# Patient Record
Sex: FEMALE | Race: WHITE | NOT HISPANIC OR LATINO
[De-identification: names, ages, dates, MRNs, and addresses within clinical notes are randomized per-mention and may not be internally consistent; named-entity substitution may affect disease eponyms.]

---

## 2017-06-01 ENCOUNTER — APPOINTMENT (OUTPATIENT)
Dept: CARDIOLOGY | Facility: CLINIC | Age: 82
End: 2017-06-01

## 2017-06-01 ENCOUNTER — NON-APPOINTMENT (OUTPATIENT)
Age: 82
End: 2017-06-01

## 2017-06-01 VITALS
DIASTOLIC BLOOD PRESSURE: 82 MMHG | SYSTOLIC BLOOD PRESSURE: 129 MMHG | BODY MASS INDEX: 20.49 KG/M2 | OXYGEN SATURATION: 93 % | WEIGHT: 120 LBS | HEIGHT: 64 IN | HEART RATE: 66 BPM

## 2017-06-01 VITALS — OXYGEN SATURATION: 96 %

## 2017-12-28 ENCOUNTER — APPOINTMENT (OUTPATIENT)
Dept: CARDIOLOGY | Facility: CLINIC | Age: 82
End: 2017-12-28
Payer: MEDICARE

## 2017-12-28 ENCOUNTER — NON-APPOINTMENT (OUTPATIENT)
Age: 82
End: 2017-12-28

## 2017-12-28 VITALS
HEART RATE: 70 BPM | DIASTOLIC BLOOD PRESSURE: 78 MMHG | OXYGEN SATURATION: 93 % | BODY MASS INDEX: 21.11 KG/M2 | SYSTOLIC BLOOD PRESSURE: 133 MMHG | WEIGHT: 123 LBS

## 2017-12-28 VITALS — HEART RATE: 68 BPM | OXYGEN SATURATION: 95 %

## 2017-12-28 PROCEDURE — 99214 OFFICE O/P EST MOD 30 MIN: CPT

## 2017-12-28 PROCEDURE — 93000 ELECTROCARDIOGRAM COMPLETE: CPT

## 2018-02-23 ENCOUNTER — EMERGENCY (EMERGENCY)
Facility: HOSPITAL | Age: 83
LOS: 1 days | Discharge: HOME | End: 2018-02-23
Attending: EMERGENCY MEDICINE | Admitting: HOSPITALIST
Payer: MEDICARE

## 2018-02-23 VITALS
RESPIRATION RATE: 18 BRPM | DIASTOLIC BLOOD PRESSURE: 81 MMHG | HEART RATE: 71 BPM | SYSTOLIC BLOOD PRESSURE: 158 MMHG | OXYGEN SATURATION: 93 %

## 2018-02-23 DIAGNOSIS — Z29.9 ENCOUNTER FOR PROPHYLACTIC MEASURES, UNSPECIFIED: ICD-10-CM

## 2018-02-23 DIAGNOSIS — R74.8 ABNORMAL LEVELS OF OTHER SERUM ENZYMES: ICD-10-CM

## 2018-02-23 DIAGNOSIS — K92.2 GASTROINTESTINAL HEMORRHAGE, UNSPECIFIED: ICD-10-CM

## 2018-02-23 DIAGNOSIS — R63.8 OTHER SYMPTOMS AND SIGNS CONCERNING FOOD AND FLUID INTAKE: ICD-10-CM

## 2018-02-23 DIAGNOSIS — R11.10 VOMITING, UNSPECIFIED: ICD-10-CM

## 2018-02-23 DIAGNOSIS — K92.0 HEMATEMESIS: ICD-10-CM

## 2018-02-23 DIAGNOSIS — F03.90 UNSPECIFIED DEMENTIA WITHOUT BEHAVIORAL DISTURBANCE: ICD-10-CM

## 2018-02-23 DIAGNOSIS — Z98.890 OTHER SPECIFIED POSTPROCEDURAL STATES: Chronic | ICD-10-CM

## 2018-02-23 LAB
ALBUMIN SERPL ELPH-MCNC: 3.5 G/DL — SIGNIFICANT CHANGE UP (ref 3.3–5)
ALBUMIN SERPL ELPH-MCNC: 3.8 G/DL — SIGNIFICANT CHANGE UP (ref 3.3–5)
ALP SERPL-CCNC: 102 U/L — SIGNIFICANT CHANGE UP (ref 40–120)
ALP SERPL-CCNC: 99 U/L — SIGNIFICANT CHANGE UP (ref 40–120)
ALT FLD-CCNC: 126 U/L RC — HIGH (ref 10–45)
ALT FLD-CCNC: 146 U/L — HIGH (ref 10–45)
ANION GAP SERPL CALC-SCNC: 13 MMOL/L — SIGNIFICANT CHANGE UP (ref 5–17)
ANION GAP SERPL CALC-SCNC: 9 MMOL/L — SIGNIFICANT CHANGE UP (ref 5–17)
APTT BLD: 32.6 SEC — SIGNIFICANT CHANGE UP (ref 27.5–37.4)
AST SERPL-CCNC: 176 U/L — HIGH (ref 10–40)
AST SERPL-CCNC: 207 U/L — HIGH (ref 10–40)
BASOPHILS # BLD AUTO: 0 K/UL — SIGNIFICANT CHANGE UP (ref 0–0.2)
BASOPHILS NFR BLD AUTO: 0.1 % — SIGNIFICANT CHANGE UP (ref 0–2)
BILIRUB SERPL-MCNC: 0.8 MG/DL — SIGNIFICANT CHANGE UP (ref 0.2–1.2)
BILIRUB SERPL-MCNC: 1.3 MG/DL — HIGH (ref 0.2–1.2)
BLD GP AB SCN SERPL QL: NEGATIVE — SIGNIFICANT CHANGE UP
BUN SERPL-MCNC: 15 MG/DL — SIGNIFICANT CHANGE UP (ref 7–23)
BUN SERPL-MCNC: 15 MG/DL — SIGNIFICANT CHANGE UP (ref 7–23)
CALCIUM SERPL-MCNC: 9.6 MG/DL — SIGNIFICANT CHANGE UP (ref 8.4–10.5)
CALCIUM SERPL-MCNC: 9.6 MG/DL — SIGNIFICANT CHANGE UP (ref 8.4–10.5)
CHLORIDE SERPL-SCNC: 105 MMOL/L — SIGNIFICANT CHANGE UP (ref 96–108)
CHLORIDE SERPL-SCNC: 106 MMOL/L — SIGNIFICANT CHANGE UP (ref 96–108)
CO2 SERPL-SCNC: 25 MMOL/L — SIGNIFICANT CHANGE UP (ref 22–31)
CO2 SERPL-SCNC: 27 MMOL/L — SIGNIFICANT CHANGE UP (ref 22–31)
CREAT SERPL-MCNC: 0.69 MG/DL — SIGNIFICANT CHANGE UP (ref 0.5–1.3)
CREAT SERPL-MCNC: 0.74 MG/DL — SIGNIFICANT CHANGE UP (ref 0.5–1.3)
EOSINOPHIL # BLD AUTO: 0 K/UL — SIGNIFICANT CHANGE UP (ref 0–0.5)
EOSINOPHIL NFR BLD AUTO: 0.3 % — SIGNIFICANT CHANGE UP (ref 0–6)
GLUCOSE SERPL-MCNC: 128 MG/DL — HIGH (ref 70–99)
GLUCOSE SERPL-MCNC: 99 MG/DL — SIGNIFICANT CHANGE UP (ref 70–99)
HCT VFR BLD CALC: 38.5 % — SIGNIFICANT CHANGE UP (ref 34.5–45)
HCT VFR BLD CALC: 40.4 % — SIGNIFICANT CHANGE UP (ref 34.5–45)
HGB BLD-MCNC: 12 G/DL — SIGNIFICANT CHANGE UP (ref 11.5–15.5)
HGB BLD-MCNC: 13.1 G/DL — SIGNIFICANT CHANGE UP (ref 11.5–15.5)
INR BLD: 1.04 RATIO — SIGNIFICANT CHANGE UP (ref 0.88–1.16)
LYMPHOCYTES # BLD AUTO: 0.8 K/UL — LOW (ref 1–3.3)
LYMPHOCYTES # BLD AUTO: 10.1 % — LOW (ref 13–44)
MCHC RBC-ENTMCNC: 29.7 PG — SIGNIFICANT CHANGE UP (ref 27–34)
MCHC RBC-ENTMCNC: 30.8 PG — SIGNIFICANT CHANGE UP (ref 27–34)
MCHC RBC-ENTMCNC: 31.2 GM/DL — LOW (ref 32–36)
MCHC RBC-ENTMCNC: 32.3 GM/DL — SIGNIFICANT CHANGE UP (ref 32–36)
MCV RBC AUTO: 95.3 FL — SIGNIFICANT CHANGE UP (ref 80–100)
MCV RBC AUTO: 95.3 FL — SIGNIFICANT CHANGE UP (ref 80–100)
MONOCYTES # BLD AUTO: 0.5 K/UL — SIGNIFICANT CHANGE UP (ref 0–0.9)
MONOCYTES NFR BLD AUTO: 5.7 % — SIGNIFICANT CHANGE UP (ref 2–14)
NEUTROPHILS # BLD AUTO: 7.1 K/UL — SIGNIFICANT CHANGE UP (ref 1.8–7.4)
NEUTROPHILS NFR BLD AUTO: 83.9 % — HIGH (ref 43–77)
PLATELET # BLD AUTO: 255 K/UL — SIGNIFICANT CHANGE UP (ref 150–400)
PLATELET # BLD AUTO: 257 K/UL — SIGNIFICANT CHANGE UP (ref 150–400)
POTASSIUM SERPL-MCNC: 4 MMOL/L — SIGNIFICANT CHANGE UP (ref 3.5–5.3)
POTASSIUM SERPL-MCNC: 4.1 MMOL/L — SIGNIFICANT CHANGE UP (ref 3.5–5.3)
POTASSIUM SERPL-SCNC: 4 MMOL/L — SIGNIFICANT CHANGE UP (ref 3.5–5.3)
POTASSIUM SERPL-SCNC: 4.1 MMOL/L — SIGNIFICANT CHANGE UP (ref 3.5–5.3)
PROT SERPL-MCNC: 6.9 G/DL — SIGNIFICANT CHANGE UP (ref 6–8.3)
PROT SERPL-MCNC: 7.7 G/DL — SIGNIFICANT CHANGE UP (ref 6–8.3)
PROTHROM AB SERPL-ACNC: 11.4 SEC — SIGNIFICANT CHANGE UP (ref 9.8–12.7)
RBC # BLD: 4.04 M/UL — SIGNIFICANT CHANGE UP (ref 3.8–5.2)
RBC # BLD: 4.24 M/UL — SIGNIFICANT CHANGE UP (ref 3.8–5.2)
RBC # FLD: 13 % — SIGNIFICANT CHANGE UP (ref 10.3–14.5)
RBC # FLD: 14.4 % — SIGNIFICANT CHANGE UP (ref 10.3–14.5)
RH IG SCN BLD-IMP: POSITIVE — SIGNIFICANT CHANGE UP
SODIUM SERPL-SCNC: 142 MMOL/L — SIGNIFICANT CHANGE UP (ref 135–145)
SODIUM SERPL-SCNC: 143 MMOL/L — SIGNIFICANT CHANGE UP (ref 135–145)
WBC # BLD: 7.34 K/UL — SIGNIFICANT CHANGE UP (ref 3.8–10.5)
WBC # BLD: 8.4 K/UL — SIGNIFICANT CHANGE UP (ref 3.8–10.5)
WBC # FLD AUTO: 7.34 K/UL — SIGNIFICANT CHANGE UP (ref 3.8–10.5)
WBC # FLD AUTO: 8.4 K/UL — SIGNIFICANT CHANGE UP (ref 3.8–10.5)

## 2018-02-23 RX ORDER — PANTOPRAZOLE SODIUM 20 MG/1
80 TABLET, DELAYED RELEASE ORAL ONCE
Qty: 0 | Refills: 0 | Status: COMPLETED | OUTPATIENT
Start: 2018-02-23 | End: 2018-02-23

## 2018-02-23 RX ADMIN — PANTOPRAZOLE SODIUM 80 MILLIGRAM(S): 20 TABLET, DELAYED RELEASE ORAL at 06:26

## 2018-02-23 NOTE — ED PROVIDER NOTE - ATTENDING CONTRIBUTION TO CARE
Patient presenting reporting coffee ground emesis prior to arrival.  Emesis witnessed by home nurse.  No prior history of GIB, no blood thinners, now reporting feeling fine, no longer nauseated.    On exam patient well appearing, vital signs within normal limits, RRR S1/S2, lungs clear to ascultation bilaterally, abdomen soft, non tender, non distended, rectal heme pos.    Patient with UGIB, currently stable, no prior episodes, however given age plan for labs/coags, PPI, admit for monitoring.

## 2018-02-23 NOTE — CONSULT NOTE ADULT - ASSESSMENT
IMP:    1) Dark colored emesis: no signs of overt bleeding currently. Normal Hgb. Vitals stable. Ddx: PUD vs erosive gastritis vs angioectasias.   2) Elevated LFTs: Ddx: CBD stone vs viral hep vs fatty liver vs DILI. No signs of cholangitis.    PLAN:  - monitor H/H  - daily PPI (take 30 minutes prior to breakfast)  - advance diet as tolerated  - no plans for endoscopic evaluation at this time given no signs of overt bleeding and normal Hgb. would pursue conservative management with PPI therapy given risk of anesthesia with the elderly.   If signs of overt bleeding with drop in Hgb, will reconsider endoscopic evaluation. discussed plan with pt's son Ever ([phone]) - he is in agreement.  - consider RUQ sonogram for evaluation of elevated LFTs.

## 2018-02-23 NOTE — H&P ADULT - NSHPLABSRESULTS_GEN_ALL_CORE
.  LABS:                         13.1   8.4   )-----------( 255      ( 23 Feb 2018 05:34 )             40.4     02-23    143  |  105  |  15  ----------------------------<  128<H>  4.1   |  25  |  0.74    Ca    9.6      23 Feb 2018 05:34    TPro  7.7  /  Alb  3.8  /  TBili  1.3<H>  /  DBili  x   /  AST  207<H>  /  ALT  126<H>  /  AlkPhos  102  02-23    PT/INR - ( 23 Feb 2018 05:34 )   PT: 11.4 sec;   INR: 1.04 ratio         PTT - ( 23 Feb 2018 05:34 )  PTT:32.6 sec              EKG Reviewed by me:    Xray reviewed by me:    Consultant notes reviewed:

## 2018-02-23 NOTE — CONSULT NOTE ADULT - SUBJECTIVE AND OBJECTIVE BOX
Chief Complaint:  Patient is a 89y old  Female who presents with a chief complaint of coffee ground emesis (23 Feb 2018 09:13)      HPI: 89F w/hx dementia presents with 4 episodes of dark colored emesis, HHA reports appeared coffee grounds. No fevers/chills/chest pain/sob/abdominal pain/melena/hematochezia. No similar episodes previously. No ASA or NSAIDs. On admission, pt afebrile comfortable appearing with nontender abdomen, normal hgb, mildly elevated Tbili and transaminases.     Allergies:  No Known Allergies      Home Medications:    Hospital Medications:      PMHX/PSHX:  Dementia  No significant past medical history  History of hip surgery      Family history:  No pertinent family history in first degree relatives      Social History:     ROS:     General:  as per HPI       PHYSICAL EXAM:   Vital Signs:  Vital Signs Last 24 Hrs  T(C): 36.5 (23 Feb 2018 05:32), Max: 36.5 (23 Feb 2018 05:32)  T(F): 97.7 (23 Feb 2018 05:32), Max: 97.7 (23 Feb 2018 05:32)  HR: 67 (23 Feb 2018 05:32) (67 - 71)  BP: 132/78 (23 Feb 2018 05:32) (132/78 - 158/81)  BP(mean): --  RR: 18 (23 Feb 2018 05:32) (18 - 18)  SpO2: 98% (23 Feb 2018 05:32) (93% - 98%)  Daily     Daily     GENERAL:  Appears stated age, well-groomed, well-nourished, no distress  HEENT:  NC/AT,  conjunctivae clear and pink, no thyromegaly, nodules, adenopathy, no JVD, sclera -anicteric  CHEST:  Full & symmetric excursion, no increased effort, breath sounds clear  HEART:  Regular rhythm, S1, S2, no murmur/rub/S3/S4, no abdominal bruit, no edema  ABDOMEN:  Soft, non-tender, non-distended, normoactive bowel sounds,  no masses ,no hepato-splenomegaly, no signs of chronic liver disease  EXTEREMITIES:  no cyanosis,clubbing or edema  SKIN:  No rash/erythema/ecchymoses/petechiae/wounds/abscess/warm/dry  NEURO:  Alert, oriented, no asterixis, no tremor, no encephalopathy    LABS:                        13.1   8.4   )-----------( 255      ( 23 Feb 2018 05:34 )             40.4     02-23    143  |  105  |  15  ----------------------------<  128<H>  4.1   |  25  |  0.74    Ca    9.6      23 Feb 2018 05:34    TPro  7.7  /  Alb  3.8  /  TBili  1.3<H>  /  DBili  x   /  AST  207<H>  /  ALT  126<H>  /  AlkPhos  102  02-23    LIVER FUNCTIONS - ( 23 Feb 2018 05:34 )  Alb: 3.8 g/dL / Pro: 7.7 g/dL / ALK PHOS: 102 U/L / ALT: 126 U/L RC / AST: 207 U/L / GGT: x           PT/INR - ( 23 Feb 2018 05:34 )   PT: 11.4 sec;   INR: 1.04 ratio         PTT - ( 23 Feb 2018 05:34 )  PTT:32.6 sec        Imaging:

## 2018-02-23 NOTE — H&P ADULT - NSHPPHYSICALEXAM_GEN_ALL_CORE
VITALS  Vital Signs Last 24 Hrs  T(C): 36.5 (23 Feb 2018 05:32), Max: 36.5 (23 Feb 2018 05:32)  T(F): 97.7 (23 Feb 2018 05:32), Max: 97.7 (23 Feb 2018 05:32)  HR: 67 (23 Feb 2018 05:32) (67 - 71)  BP: 132/78 (23 Feb 2018 05:32) (132/78 - 158/81)  BP(mean): --  RR: 18 (23 Feb 2018 05:32) (18 - 18)  SpO2: 98% (23 Feb 2018 05:32) (93% - 98%)    I&O's Summary      PHYSICAL EXAM  General: A&Ox1 (knew birthday), NAD, frail  Head: NC/AT;  Eyes: PERRL; EOMI; anicteric sclera  Neck: Supple; no JVD  Respiratory: CTA B/L; no wheezes/crackles/rales   Cardiovascular: Regular rhythm/rate; S1/S2, DAVIS at RUSB  Gastrointestinal: Soft; NTND w/out rebound tenderness or guarding; bowel sounds normal  Extremities: WWP; no edema or cyanosis  Neurological:  CNII-XII grossly intact; no obvious focal deficits

## 2018-02-23 NOTE — ED PROVIDER NOTE - MEDICAL DECISION MAKING DETAILS
89 Y F with coffee ground emesis this evening at 2am now with no symptoms. DDx: UGIB, gastritis, will get CBC, CMP, type and cross, coags. Likely dispo admit pending workup.

## 2018-02-23 NOTE — H&P ADULT - PROBLEM SELECTOR PLAN 1
- Pt with cofffee ground emesis prior to arrival, no further episodes.  Hgb stable, will repeat in 8 hours from initial to make sure not trending down  - GI consult placed  - NPO pending GI recs  - PPI BID

## 2018-02-23 NOTE — ED ADULT NURSE REASSESSMENT NOTE - NS ED NURSE REASSESS COMMENT FT1
Report received from change of shift VERONICA CASTELLANO Patient resting comfortably. RTM Clicked, awaiting bed assignment. Comfort and safety provided.

## 2018-02-23 NOTE — H&P ADULT - PROBLEM SELECTOR PLAN 3
- SCDs - Pt with elevated AST, ALT, unknown baseline.  Currently without any abdominal pain, may have been triggered from vomiting.  Will repeat CMP, if continues to trend up, will consider further imaging at that time.

## 2018-02-23 NOTE — H&P ADULT - HISTORY OF PRESENT ILLNESS
89 yo female with hx of dementia presents with 4 episodes of coffee ground emesis as per HHA last night at 2am.  She states that she has never had this before, and given the acuity she called EMS.  Currently pt denies any n/v/d, or abdominal pain.  She reports feeling well, denies any dizziness.  Pt also denies any CP, or SOB.  The HHA states that the patient did not eat anything different than her usual meals.  ROS otherwise negative.    In the ED the pt received 80mg IV Protonix.

## 2018-02-23 NOTE — ED PROVIDER NOTE - OBJECTIVE STATEMENT
pt 89 y F with no PMHx who presents after 4 episodes of coffee ground emesis at 2 am this morning. pt states that she has never had hx of this before, she denies any symptoms now. She states that she feels great now. She denies chest pain, SOB, weakness, LOC, abdominal pain, diarrhea, constipation. She has never been worked up for upper GI problems and she has not been having any abdominal pain.

## 2018-02-24 ENCOUNTER — TRANSCRIPTION ENCOUNTER (OUTPATIENT)
Age: 83
End: 2018-02-24

## 2018-02-24 VITALS
DIASTOLIC BLOOD PRESSURE: 71 MMHG | OXYGEN SATURATION: 95 % | SYSTOLIC BLOOD PRESSURE: 119 MMHG | TEMPERATURE: 98 F | RESPIRATION RATE: 16 BRPM | HEART RATE: 66 BPM

## 2018-02-24 LAB
ALBUMIN SERPL ELPH-MCNC: 3.6 G/DL — SIGNIFICANT CHANGE UP (ref 3.3–5)
ALP SERPL-CCNC: 96 U/L — SIGNIFICANT CHANGE UP (ref 40–120)
ALT FLD-CCNC: 90 U/L RC — HIGH (ref 10–45)
ANION GAP SERPL CALC-SCNC: 11 MMOL/L — SIGNIFICANT CHANGE UP (ref 5–17)
AST SERPL-CCNC: 62 U/L — HIGH (ref 10–40)
BASOPHILS # BLD AUTO: 0 K/UL — SIGNIFICANT CHANGE UP (ref 0–0.2)
BASOPHILS NFR BLD AUTO: 0.3 % — SIGNIFICANT CHANGE UP (ref 0–2)
BILIRUB SERPL-MCNC: 0.8 MG/DL — SIGNIFICANT CHANGE UP (ref 0.2–1.2)
BUN SERPL-MCNC: 8 MG/DL — SIGNIFICANT CHANGE UP (ref 7–23)
CALCIUM SERPL-MCNC: 9.4 MG/DL — SIGNIFICANT CHANGE UP (ref 8.4–10.5)
CHLORIDE SERPL-SCNC: 105 MMOL/L — SIGNIFICANT CHANGE UP (ref 96–108)
CO2 SERPL-SCNC: 28 MMOL/L — SIGNIFICANT CHANGE UP (ref 22–31)
CREAT SERPL-MCNC: 0.73 MG/DL — SIGNIFICANT CHANGE UP (ref 0.5–1.3)
EOSINOPHIL # BLD AUTO: 0.1 K/UL — SIGNIFICANT CHANGE UP (ref 0–0.5)
EOSINOPHIL NFR BLD AUTO: 1.9 % — SIGNIFICANT CHANGE UP (ref 0–6)
GLUCOSE SERPL-MCNC: 85 MG/DL — SIGNIFICANT CHANGE UP (ref 70–99)
HCT VFR BLD CALC: 39.6 % — SIGNIFICANT CHANGE UP (ref 34.5–45)
HGB BLD-MCNC: 12.7 G/DL — SIGNIFICANT CHANGE UP (ref 11.5–15.5)
LYMPHOCYTES # BLD AUTO: 1.3 K/UL — SIGNIFICANT CHANGE UP (ref 1–3.3)
LYMPHOCYTES # BLD AUTO: 23.9 % — SIGNIFICANT CHANGE UP (ref 13–44)
MCHC RBC-ENTMCNC: 31 PG — SIGNIFICANT CHANGE UP (ref 27–34)
MCHC RBC-ENTMCNC: 32.2 GM/DL — SIGNIFICANT CHANGE UP (ref 32–36)
MCV RBC AUTO: 96.1 FL — SIGNIFICANT CHANGE UP (ref 80–100)
MONOCYTES # BLD AUTO: 0.5 K/UL — SIGNIFICANT CHANGE UP (ref 0–0.9)
MONOCYTES NFR BLD AUTO: 8.4 % — SIGNIFICANT CHANGE UP (ref 2–14)
NEUTROPHILS # BLD AUTO: 3.6 K/UL — SIGNIFICANT CHANGE UP (ref 1.8–7.4)
NEUTROPHILS NFR BLD AUTO: 65.4 % — SIGNIFICANT CHANGE UP (ref 43–77)
PLATELET # BLD AUTO: 259 K/UL — SIGNIFICANT CHANGE UP (ref 150–400)
POTASSIUM SERPL-MCNC: 3.9 MMOL/L — SIGNIFICANT CHANGE UP (ref 3.5–5.3)
POTASSIUM SERPL-SCNC: 3.9 MMOL/L — SIGNIFICANT CHANGE UP (ref 3.5–5.3)
PROT SERPL-MCNC: 7.1 G/DL — SIGNIFICANT CHANGE UP (ref 6–8.3)
RBC # BLD: 4.12 M/UL — SIGNIFICANT CHANGE UP (ref 3.8–5.2)
RBC # FLD: 13 % — SIGNIFICANT CHANGE UP (ref 10.3–14.5)
SODIUM SERPL-SCNC: 144 MMOL/L — SIGNIFICANT CHANGE UP (ref 135–145)
WBC # BLD: 5.6 K/UL — SIGNIFICANT CHANGE UP (ref 3.8–10.5)
WBC # FLD AUTO: 5.6 K/UL — SIGNIFICANT CHANGE UP (ref 3.8–10.5)

## 2018-02-24 PROCEDURE — 93005 ELECTROCARDIOGRAM TRACING: CPT

## 2018-02-24 PROCEDURE — 90686 IIV4 VACC NO PRSV 0.5 ML IM: CPT

## 2018-02-24 PROCEDURE — 86850 RBC ANTIBODY SCREEN: CPT

## 2018-02-24 PROCEDURE — 86901 BLOOD TYPING SEROLOGIC RH(D): CPT

## 2018-02-24 PROCEDURE — 99285 EMERGENCY DEPT VISIT HI MDM: CPT | Mod: 25

## 2018-02-24 PROCEDURE — 85730 THROMBOPLASTIN TIME PARTIAL: CPT

## 2018-02-24 PROCEDURE — 85027 COMPLETE CBC AUTOMATED: CPT

## 2018-02-24 PROCEDURE — 85610 PROTHROMBIN TIME: CPT

## 2018-02-24 PROCEDURE — 80053 COMPREHEN METABOLIC PANEL: CPT

## 2018-02-24 PROCEDURE — 86900 BLOOD TYPING SEROLOGIC ABO: CPT

## 2018-02-24 PROCEDURE — 76700 US EXAM ABDOM COMPLETE: CPT

## 2018-02-24 RX ORDER — INFLUENZA VIRUS VACCINE 15; 15; 15; 15 UG/.5ML; UG/.5ML; UG/.5ML; UG/.5ML
0.5 SUSPENSION INTRAMUSCULAR ONCE
Qty: 0 | Refills: 0 | Status: COMPLETED | OUTPATIENT
Start: 2018-02-24 | End: 2018-02-24

## 2018-02-24 RX ORDER — PANTOPRAZOLE SODIUM 20 MG/1
1 TABLET, DELAYED RELEASE ORAL
Qty: 0 | Refills: 0 | DISCHARGE
Start: 2018-02-24 | End: 2018-03-25

## 2018-02-24 RX ORDER — INFLUENZA VIRUS VACCINE 15; 15; 15; 15 UG/.5ML; UG/.5ML; UG/.5ML; UG/.5ML
0.5 SUSPENSION INTRAMUSCULAR ONCE
Qty: 0 | Refills: 0 | Status: DISCONTINUED | OUTPATIENT
Start: 2018-02-24 | End: 2018-02-24

## 2018-02-24 RX ORDER — PANTOPRAZOLE SODIUM 20 MG/1
40 TABLET, DELAYED RELEASE ORAL
Qty: 0 | Refills: 0 | Status: DISCONTINUED | OUTPATIENT
Start: 2018-02-24 | End: 2018-02-24

## 2018-02-24 RX ORDER — PANTOPRAZOLE SODIUM 20 MG/1
1 TABLET, DELAYED RELEASE ORAL
Qty: 60 | Refills: 0
Start: 2018-02-24 | End: 2018-03-25

## 2018-02-24 RX ADMIN — PANTOPRAZOLE SODIUM 40 MILLIGRAM(S): 20 TABLET, DELAYED RELEASE ORAL at 06:36

## 2018-02-24 RX ADMIN — INFLUENZA VIRUS VACCINE 0.5 MILLILITER(S): 15; 15; 15; 15 SUSPENSION INTRAMUSCULAR at 11:25

## 2018-02-24 NOTE — DISCHARGE NOTE ADULT - PATIENT PORTAL LINK FT
You can access the [facility] Patient Portal, offered by [facility], by registering with the following website: [email or web address]

## 2018-02-24 NOTE — DISCHARGE NOTE ADULT - CARE PROVIDER_API CALL
Sae Gutierrez), Cardiology; Internal Medicine  [address] [address]  Phone: [phone]  Fax: [phone]

## 2018-02-24 NOTE — DISCHARGE NOTE ADULT - CARE PLAN
Principal Discharge DX:	Hematemesis without nausea  Goal:	Resolution of bleeding  Assessment and plan of treatment:	You came to the hospital because of bloody vomiting. During this hospital stay you were evaluated by gastroenterology who felt that your bleeding had stabilized as your blood pressure and hemoglobin were normal. Please continue taking pantoprazole as instructed once you are discharged and follow up with your primary doctor. If you notice episodes of bloody vomiting or see blood in your stool (or very dark tarry stools) call your doctor immediately or return to the emergency department.  Secondary Diagnosis:	Elevated liver enzymes  Assessment and plan of treatment:	During this admission your liver enzymes were elevated. The cause of this abnormality was not clear, but the enzymes began improving without specific intervention. Please follow up with your primary doctor for repeat testing once you are discharged.  Secondary Diagnosis:	Aortic aneurysm  Assessment and plan of treatment:	You were noted to have a 2.5 cm aortic aneurysm during this admission. Please discuss continued routine surveillance of this with your primary care doctor.

## 2018-02-24 NOTE — DISCHARGE NOTE ADULT - PLAN OF CARE
Resolution of bleeding You came to the hospital because of bloody vomiting. During this hospital stay you were evaluated by gastroenterology who felt that your bleeding had stabilized as your blood pressure and hemoglobin were normal. Please continue taking pantoprazole as instructed once you are discharged and follow up with your primary doctor. If you notice episodes of bloody vomiting or see blood in your stool (or very dark tarry stools) call your doctor immediately or return to the emergency department. During this admission your liver enzymes were elevated. The cause of this abnormality was not clear, but the enzymes began improving without specific intervention. Please follow up with your primary doctor for repeat testing once you are discharged. You were noted to have a 2.5 cm aortic aneurysm during this admission. Please discuss continued routine surveillance of this with your primary care doctor.

## 2018-02-24 NOTE — DISCHARGE NOTE ADULT - HOSPITAL COURSE
89 yo f pmh of dementia presents with 4 episodes of coffee ground emesis as per HHA last night at 2am.  On arrival to the ED her hematemesis had resolved; vital signs were stable and she was not hypotensive or tachycardic. She received 80mg IV Protonix and was evaluated by GI who deferred endoscopy as the patient did not appear to be actively bleeding and was hemodynamically stable. Her LFTs were mildly elevated on admission and she underwent a RUQ ultrasound which did not demonstrate biliary dilatation, gallstones or any other cause of her transaminitis. She was observed overnight and remained asymptomatic and hemodynamically stable; Hgb remained stable throughout her admission. She was discharged home with her home health aid with protonix for outpatient follow up. 89 yo f pmh of dementia presents with 4 episodes of coffee ground emesis as per HHA last night at 2am.  On arrival to the ED her hematemesis had resolved; vital signs were stable and she was not hypotensive or tachycardic. She received 80mg IV Protonix and was evaluated by GI who deferred endoscopy as the patient did not appear to be actively bleeding and was hemodynamically stable. Her LFTs were mildly elevated on admission and she underwent a RUQ ultrasound which did not demonstrate biliary dilatation, gallstones or any other cause of her transaminitis. This imaging did incidentally note a 2.5 cm aortic aneurysm She was observed overnight and remained asymptomatic and hemodynamically stable; Hgb remained stable throughout her admission. She was discharged home with her home health aid with protonix for outpatient follow up. Her son was notified of the 2.5 cm aortic aneurysm and told that she should follow up with her primary doctor. 89 yo f pmh of dementia presents with 4 episodes of coffee ground emesis as per HHA last night at 2am.  On arrival to the ED her hematemesis had resolved; vital signs were stable and she was not hypotensive or tachycardic. She received 80mg IV Protonix and was evaluated by GI who deferred endoscopy as the patient did not appear to be actively bleeding and was hemodynamically stable. Her LFTs were mildly elevated on admission and she underwent a RUQ ultrasound which did not demonstrate biliary dilatation, gallstones or any other cause of her transaminitis. This imaging did incidentally note a 2.5 cm aortic aneurysm She was observed overnight and remained asymptomatic and hemodynamically stable; Hgb remained stable throughout her admission. She was discharged home with her home health aid with protonix for outpatient follow up. Her son was notified of the 2.5 cm aortic aneurysm and told that she should follow up with her primary doctor.     Discharge time spent: 34 min

## 2018-02-24 NOTE — PROGRESS NOTE ADULT - ASSESSMENT
89 yo female with hx of dementia presents with coffee ground emesis, currently resolved with stable BP and Hgb.

## 2018-02-24 NOTE — PROGRESS NOTE ADULT - PROBLEM SELECTOR PLAN 3
Mild transaminitis on admission now downtrending  - RUQ w/o explanation for transaminitis  - Continue to monitor to resolution

## 2018-02-24 NOTE — PROGRESS NOTE ADULT - PROBLEM SELECTOR PLAN 1
Cofffee ground emesis x4 prior to arrival, no further episodes.  - Hgb stable on repeat, BP stable, no tachy  - Appreciate GI recs, will advance diet today as tolerated  - Continue protonix 40 BID Coffee ground emesis x4 prior to arrival, no further episodes.  - Hgb stable on repeat, BP stable, no tachy  - Appreciate GI recs, will advance diet today as tolerated  - Continue protonix 40 BID

## 2018-02-24 NOTE — DISCHARGE NOTE ADULT - INSTRUCTIONS
Please eat a healthy diet that is low in sodium. Please avoid excess alcohol, coffee, cheese, chocolate and acidic foods as these may exacerbate your GI bleed.

## 2018-02-24 NOTE — DISCHARGE NOTE ADULT - ADDITIONAL INSTRUCTIONS
Please follow up with your primary care doctor within one week of discharge for repeat blood work (CBC, CMP).  Please notify your doctor if you experience any bleeding or bloody vomitus.  During this admission imaging demonstrated a 2.5 cm abdominal aortic aneurysm, no intervention was preformed for this while you were hospitalized, please discuss this finding with your primary care doctor.

## 2018-02-24 NOTE — PROGRESS NOTE ADULT - SUBJECTIVE AND OBJECTIVE BOX
CONTACT INFO  Jamal Clay M.D., PGY-1  Pager: [facility]- [phone], [facility]- [phone]    Mon-Fri: pager covered by day team 7am-7pm;   ***Academic conferences M-F 8am-9am & 12pm-1pm- page ONLY if URGENT or if Consultant  /Alia: see chart, primary physician assigned available 7am-12pm  Sat/[place] Coverage 12pm-7pm: NS- page [phone] for Team1-4, [facility]- pager forwarded to covering Resident  For Night coverage 7pm-7am: NS- page [phone] Team1-3, page [phone] Team4 & Care Model    CAROLYN CARRION  89y  Female      Patient is a 89y old  Female who presents with a chief complaint of coffee ground emesis (23 Feb 2018 09:13)      INTERVAL HPI/OVERNIGHT EVENTS: Admitted yesterday following hematemesis at home, hgb/BP stable. Abd US for transaminitis yesterday non-revealing. GI defers intervention. NAEON, patient w/o hematemesis and completely asymptomatic this AM.     REVIEW OF SYSTEMS:  CONSTITUTIONAL: No fever  RESPIRATORY: No cough, wheezing, chills or hemoptysis; No shortness of breath  CARDIOVASCULAR: No chest pain  GASTROINTESTINAL: No abdominal or epigastric pain. No diarrhea or constipation. No nausea, vomiting, or hematemesis. No melena or hematochezia.  GENITOURINARY: No dysuria  NEUROLOGICAL: No headaches  MUSCULOSKELETAL: No joint pain  SKIN: No itching  PSYCHIATRIC: No difficulty sleeping  HEME/LYMPH: No easy bleeding    Vital Signs Last 24 Hrs  T(C): 36.6 (24 Feb 2018 05:01), Max: 37.1 (23 Feb 2018 17:15)  T(F): 97.9 (24 Feb 2018 05:01), Max: 98.8 (23 Feb 2018 17:15)  HR: 61 (24 Feb 2018 05:01) (57 - 64)  BP: 142/85 (24 Feb 2018 05:01) (129/78 - 173/77)  BP(mean): --  RR: 18 (24 Feb 2018 05:01) (16 - 18)  SpO2: 97% (24 Feb 2018 05:01) (93% - 97%)    PHYSICAL EXAM:  GENERAL: NAD  HEAD:  Atraumatic  EYES: EOMI  ENMT: Moist mucous membranes  NECK: Supple  NERVOUS SYSTEM:  Alert & Oriented X3, Good concentration; MAEW, SILT distal extremity  CHEST/LUNG: Clear to auscultation bilaterally; No rales, rhonchi, wheezing, or rubs  HEART: Regular rate and rhythm; III/VI systolic ejection murmur  ABDOMEN: Soft, Nontender, Nondistended; Bowel sounds present  EXTREMITIES: s/p L pinky amputation. Warm, no edema  SKIN: No rashes or lesions    Consultant(s) Notes Reviewed: GI    LABS:                        12.7   5.6   )-----------( 259      ( 24 Feb 2018 06:55 )             39.6     02-24    144  |  105  |  8   ----------------------------<  85  3.9   |  28  |  0.73    Ca    9.4      24 Feb 2018 06:55    TPro  7.1  /  Alb  3.6  /  TBili  0.8  /  DBili  x   /  AST  62<H>  /  ALT  90<H>  /  AlkPhos  96  02-24    PT/INR - ( 23 Feb 2018 05:34 )   PT: 11.4 sec;   INR: 1.04 ratio         PTT - ( 23 Feb 2018 05:34 )  PTT:32.6 sec

## 2018-03-08 ENCOUNTER — APPOINTMENT (OUTPATIENT)
Dept: CARDIOLOGY | Facility: CLINIC | Age: 83
End: 2018-03-08
Payer: MEDICARE

## 2018-03-08 ENCOUNTER — NON-APPOINTMENT (OUTPATIENT)
Age: 83
End: 2018-03-08

## 2018-03-08 VITALS
WEIGHT: 128 LBS | DIASTOLIC BLOOD PRESSURE: 79 MMHG | HEART RATE: 84 BPM | OXYGEN SATURATION: 94 % | BODY MASS INDEX: 21.85 KG/M2 | HEIGHT: 64 IN | SYSTOLIC BLOOD PRESSURE: 133 MMHG

## 2018-03-08 PROCEDURE — 99214 OFFICE O/P EST MOD 30 MIN: CPT

## 2018-03-08 PROCEDURE — 93000 ELECTROCARDIOGRAM COMPLETE: CPT

## 2018-03-12 LAB
ALBUMIN SERPL ELPH-MCNC: 4.1 G/DL
ALP BLD-CCNC: 258 U/L
ALT SERPL-CCNC: 251 U/L
ANION GAP SERPL CALC-SCNC: 19 MMOL/L
AST SERPL-CCNC: 129 U/L
BASOPHILS # BLD AUTO: 0.01 K/UL
BASOPHILS NFR BLD AUTO: 0.1 %
BILIRUB SERPL-MCNC: 2.1 MG/DL
BUN SERPL-MCNC: 17 MG/DL
CALCIUM SERPL-MCNC: 9.7 MG/DL
CHLORIDE SERPL-SCNC: 101 MMOL/L
CHOLEST SERPL-MCNC: 216 MG/DL
CHOLEST/HDLC SERPL: 4.7 RATIO
CO2 SERPL-SCNC: 23 MMOL/L
CREAT SERPL-MCNC: 0.89 MG/DL
EOSINOPHIL # BLD AUTO: 0 K/UL
EOSINOPHIL NFR BLD AUTO: 0 %
ESTIMATED AVERAGE GLUCOSE: 117 MG/DL
GLUCOSE SERPL-MCNC: 138 MG/DL
HBA1C MFR BLD HPLC: 5.7 %
HCT VFR BLD CALC: 42.9 %
HDLC SERPL-MCNC: 46 MG/DL
HGB BLD-MCNC: 13 G/DL
IMM GRANULOCYTES NFR BLD AUTO: 0.2 %
LDLC SERPL CALC-MCNC: 149 MG/DL
LYMPHOCYTES # BLD AUTO: 0.95 K/UL
LYMPHOCYTES NFR BLD AUTO: 7.4 %
MAN DIFF?: NORMAL
MCHC RBC-ENTMCNC: 29.8 PG
MCHC RBC-ENTMCNC: 30.3 GM/DL
MCV RBC AUTO: 98.4 FL
MONOCYTES # BLD AUTO: 0.58 K/UL
MONOCYTES NFR BLD AUTO: 4.5 %
NEUTROPHILS # BLD AUTO: 11.23 K/UL
NEUTROPHILS NFR BLD AUTO: 87.8 %
PLATELET # BLD AUTO: 253 K/UL
POTASSIUM SERPL-SCNC: 3.8 MMOL/L
PROT SERPL-MCNC: 7.9 G/DL
RBC # BLD: 4.36 M/UL
RBC # FLD: 15.4 %
SODIUM SERPL-SCNC: 143 MMOL/L
T4 SERPL-MCNC: 9 UG/DL
TRIGL SERPL-MCNC: 105 MG/DL
TSH SERPL-ACNC: 1.9 UIU/ML
WBC # FLD AUTO: 12.79 K/UL

## 2018-04-13 ENCOUNTER — MOBILE ON CALL (OUTPATIENT)
Age: 83
End: 2018-04-13

## 2018-04-25 ENCOUNTER — APPOINTMENT (OUTPATIENT)
Dept: ULTRASOUND IMAGING | Facility: CLINIC | Age: 83
End: 2018-04-25

## 2018-05-11 ENCOUNTER — APPOINTMENT (OUTPATIENT)
Dept: INTERNAL MEDICINE | Facility: CLINIC | Age: 83
End: 2018-05-11

## 2018-05-29 ENCOUNTER — OTHER (OUTPATIENT)
Age: 83
End: 2018-05-29

## 2018-06-16 NOTE — DISCHARGE NOTE ADULT - THE PATIENT HAS
DISCHARGE PLANNING     Discharge to home or other facility with appropriate resources Progressing        DISCHARGE PLANNING - CARE MANAGEMENT     Discharge to post-acute care or home with appropriate resources Progressing        INFECTION - ADULT     Absence or prevention of progression during hospitalization Progressing     Absence of fever/infection during neutropenic period Progressing        Knowledge Deficit     Patient/family/caregiver demonstrates understanding of disease process, treatment plan, medications, and discharge instructions Progressing        Nutrition/Hydration-ADULT     Nutrient/Hydration intake appropriate for improving, restoring or maintaining nutritional needs Progressing        PAIN - ADULT     Verbalizes/displays adequate comfort level or baseline comfort level Progressing        RESPIRATORY - ADULT     Achieves optimal ventilation and oxygenation Progressing        SAFETY ADULT     Patient will remain free of falls Progressing     Maintain or return to baseline ADL function Progressing     Maintain or return mobility status to optimal level Progressing no difficulties

## 2018-07-10 ENCOUNTER — APPOINTMENT (OUTPATIENT)
Dept: CARDIOLOGY | Facility: CLINIC | Age: 83
End: 2018-07-10
Payer: MEDICARE

## 2018-07-10 ENCOUNTER — NON-APPOINTMENT (OUTPATIENT)
Age: 83
End: 2018-07-10

## 2018-07-10 VITALS
SYSTOLIC BLOOD PRESSURE: 123 MMHG | HEART RATE: 66 BPM | HEIGHT: 64 IN | WEIGHT: 128 LBS | BODY MASS INDEX: 21.85 KG/M2 | DIASTOLIC BLOOD PRESSURE: 79 MMHG

## 2018-07-10 DIAGNOSIS — K92.0 HEMATEMESIS: ICD-10-CM

## 2018-07-10 DIAGNOSIS — R11.2 NAUSEA WITH VOMITING, UNSPECIFIED: ICD-10-CM

## 2018-07-10 PROCEDURE — 93000 ELECTROCARDIOGRAM COMPLETE: CPT

## 2018-07-10 PROCEDURE — 99214 OFFICE O/P EST MOD 30 MIN: CPT

## 2018-07-11 LAB
ALBUMIN SERPL ELPH-MCNC: 4.1 G/DL
ALP BLD-CCNC: 93 U/L
ALT SERPL-CCNC: 16 U/L
ANION GAP SERPL CALC-SCNC: 15 MMOL/L
AST SERPL-CCNC: 21 U/L
BASOPHILS # BLD AUTO: 0.01 K/UL
BASOPHILS NFR BLD AUTO: 0.2 %
BILIRUB SERPL-MCNC: 0.6 MG/DL
BUN SERPL-MCNC: 11 MG/DL
CALCIUM SERPL-MCNC: 9.5 MG/DL
CHLORIDE SERPL-SCNC: 103 MMOL/L
CHOLEST SERPL-MCNC: 246 MG/DL
CHOLEST/HDLC SERPL: 6.3 RATIO
CO2 SERPL-SCNC: 25 MMOL/L
CREAT SERPL-MCNC: 0.78 MG/DL
EOSINOPHIL # BLD AUTO: 0.04 K/UL
EOSINOPHIL NFR BLD AUTO: 0.7 %
ESTIMATED AVERAGE GLUCOSE: 111 MG/DL
GLUCOSE SERPL-MCNC: 95 MG/DL
HBA1C MFR BLD HPLC: 5.5 %
HCT VFR BLD CALC: 42.6 %
HDLC SERPL-MCNC: 39 MG/DL
HGB BLD-MCNC: 13.1 G/DL
IMM GRANULOCYTES NFR BLD AUTO: 0.2 %
LDLC SERPL CALC-MCNC: 178 MG/DL
LYMPHOCYTES # BLD AUTO: 1.24 K/UL
LYMPHOCYTES NFR BLD AUTO: 22 %
MAN DIFF?: NORMAL
MCHC RBC-ENTMCNC: 30.8 GM/DL
MCHC RBC-ENTMCNC: 30.8 PG
MCV RBC AUTO: 100.2 FL
MONOCYTES # BLD AUTO: 0.42 K/UL
MONOCYTES NFR BLD AUTO: 7.4 %
NEUTROPHILS # BLD AUTO: 3.92 K/UL
NEUTROPHILS NFR BLD AUTO: 69.5 %
PLATELET # BLD AUTO: 280 K/UL
POTASSIUM SERPL-SCNC: 4.1 MMOL/L
PROT SERPL-MCNC: 7.3 G/DL
RBC # BLD: 4.25 M/UL
RBC # FLD: 14.7 %
SODIUM SERPL-SCNC: 143 MMOL/L
T4 SERPL-MCNC: 7.7 UG/DL
TRIGL SERPL-MCNC: 144 MG/DL
TSH SERPL-ACNC: 4.42 UIU/ML
WBC # FLD AUTO: 5.64 K/UL

## 2018-08-30 NOTE — PATIENT PROFILE ADULT. - EXTENSIONS OF SELF_ADULT
Subjective:       Patient ID: Isha Erazo is a 44 y.o. female.    Chief Complaint: Annual Exam (refill Zoloft /discuss possibility of switching / refill Deplin to [facility].); Leg Problem (thinks she have restless legs); and Sore Throat (from snoring / wants refill of Flonase)    On Zoloft for a long time. Still anxious. Wants to try a newer med.      Review of Systems   Constitutional: Negative.    Respiratory: Negative for shortness of breath.    Cardiovascular: Negative for chest pain.   Psychiatric/Behavioral: Negative for dysphoric mood. The patient is nervous/anxious.        Objective:      Physical Exam   Constitutional: She is oriented to person, place, and time. She appears well-developed and well-nourished.   HENT:   Head: Normocephalic and atraumatic.   Right Ear: External ear normal.   Left Ear: External ear normal.   Nose: Nose normal.   Mouth/Throat: Oropharynx is clear and moist. No oropharyngeal exudate.   Eyes: EOM are normal. Pupils are equal, round, and reactive to light. Right eye exhibits no discharge. Left eye exhibits no discharge.   Neck: Normal range of motion. Neck supple. No JVD present. No thyromegaly present.   Cardiovascular: Normal rate, regular rhythm and intact distal pulses. Exam reveals no gallop and no friction rub.   No murmur heard.  Pulmonary/Chest: Effort normal and breath sounds normal. No respiratory distress. She has no rales. She exhibits no tenderness.   Abdominal: Soft. Bowel sounds are normal. There is no tenderness. There is no rebound.   Musculoskeletal: Normal range of motion. She exhibits no edema.   Neurological: She is alert and oriented to person, place, and time.   Skin: Skin is warm. She is not diaphoretic.   Psychiatric: She has a normal mood and affect.       Assessment:       1. Allergic rhinitis due to pollen, unspecified seasonality    2. Anxiety associated with depression    3. Routine adult health maintenance    4. RLS (restless legs syndrome)         Plan:       Per orders and D/C instructions.  Try Torsten for RLS.  Change to Viibryd for anxiety.    Screening: The patient was screened for depression with the PHQ2 questionnaire and possible health consequences were discussed with the patient, who understands (15 minutes spent). The patient was screened for the misuse of alcohol, by asking the number of drinks per average week, and if pt has had more than 4 drinks (more than 3 for women and elderly) in 1 day within the past year. The health and legal consequences of misuse were discussed (15 minutes spent). The patient was screened for obesity (BMI>30), If the current BMI > 30, then the possible consequences of obesity, as well as the benefits of diet, exercise, and weight loss were discussed (15 minutes spent).        Dentures

## 2018-10-17 ENCOUNTER — APPOINTMENT (OUTPATIENT)
Dept: CARDIOLOGY | Facility: CLINIC | Age: 83
End: 2018-10-17
Payer: MEDICARE

## 2018-10-17 ENCOUNTER — NON-APPOINTMENT (OUTPATIENT)
Age: 83
End: 2018-10-17

## 2018-10-17 VITALS — OXYGEN SATURATION: 96 %

## 2018-10-17 VITALS
DIASTOLIC BLOOD PRESSURE: 76 MMHG | SYSTOLIC BLOOD PRESSURE: 123 MMHG | HEIGHT: 64 IN | OXYGEN SATURATION: 94 % | BODY MASS INDEX: 20.49 KG/M2 | WEIGHT: 120 LBS | HEART RATE: 66 BPM

## 2018-10-17 PROBLEM — F03.90 UNSPECIFIED DEMENTIA WITHOUT BEHAVIORAL DISTURBANCE: Chronic | Status: ACTIVE | Noted: 2018-02-23

## 2018-10-17 PROCEDURE — G0008: CPT

## 2018-10-17 PROCEDURE — 99214 OFFICE O/P EST MOD 30 MIN: CPT

## 2018-10-17 PROCEDURE — 90662 IIV NO PRSV INCREASED AG IM: CPT

## 2018-10-17 PROCEDURE — 93000 ELECTROCARDIOGRAM COMPLETE: CPT

## 2019-01-23 ENCOUNTER — NON-APPOINTMENT (OUTPATIENT)
Age: 84
End: 2019-01-23

## 2019-01-23 ENCOUNTER — APPOINTMENT (OUTPATIENT)
Dept: CARDIOLOGY | Facility: CLINIC | Age: 84
End: 2019-01-23
Payer: MEDICARE

## 2019-01-23 VITALS
WEIGHT: 120 LBS | HEIGHT: 64 IN | DIASTOLIC BLOOD PRESSURE: 70 MMHG | BODY MASS INDEX: 20.49 KG/M2 | SYSTOLIC BLOOD PRESSURE: 124 MMHG | HEART RATE: 70 BPM | OXYGEN SATURATION: 98 %

## 2019-01-23 PROCEDURE — 93000 ELECTROCARDIOGRAM COMPLETE: CPT

## 2019-01-23 PROCEDURE — 99214 OFFICE O/P EST MOD 30 MIN: CPT

## 2019-01-23 NOTE — PHYSICAL EXAM
[General Appearance - Well Developed] : well developed [Normal Appearance] : normal appearance [Well Groomed] : well groomed [General Appearance - Well Nourished] : well nourished [No Deformities] : no deformities [General Appearance - In No Acute Distress] : no acute distress [Normal Conjunctiva] : the conjunctiva exhibited no abnormalities [Eyelids - No Xanthelasma] : the eyelids demonstrated no xanthelasmas [Normal Oral Mucosa] : normal oral mucosa [No Oral Pallor] : no oral pallor [No Oral Cyanosis] : no oral cyanosis [Normal Jugular Venous A Waves Present] : normal jugular venous A waves present [Normal Jugular Venous V Waves Present] : normal jugular venous V waves present [No Jugular Venous Cevallos A Waves] : no jugular venous cevallos A waves [Respiration, Rhythm And Depth] : normal respiratory rhythm and effort [Exaggerated Use Of Accessory Muscles For Inspiration] : no accessory muscle use [Auscultation Breath Sounds / Voice Sounds] : lungs were clear to auscultation bilaterally [Heart Rate And Rhythm] : heart rate and rhythm were normal [Heart Sounds] : normal S1 and S2 [Abdomen Soft] : soft [Abdomen Tenderness] : non-tender [Abdomen Mass (___ Cm)] : no abdominal mass palpated [Nail Clubbing] : no clubbing of the fingernails [Cyanosis, Localized] : no localized cyanosis [Petechial Hemorrhages (___cm)] : no petechial hemorrhages [Skin Color & Pigmentation] : normal skin color and pigmentation [] : no rash [No Venous Stasis] : no venous stasis [Skin Lesions] : no skin lesions [No Skin Ulcers] : no skin ulcer [No Xanthoma] : no  xanthoma was observed [Oriented To Time, Place, And Person] : oriented to person, place, and time [Affect] : the affect was normal [Mood] : the mood was normal [No Anxiety] : not feeling anxious [FreeTextEntry1] : requires a walker or wheelchair

## 2019-01-23 NOTE — REASON FOR VISIT
[FreeTextEntry1] : The patient comes in for followup of her aortic stenosis, mitral regurgitation, hypercholesterolemia, and spinal stenosis. She has no new complaints. She denies any chest pains, shortness of breath, or palpitations. Her aide reports reflux. She is changed around so that her largest meal of the day is lunch and  not dinner. She doesn't give her the Ensure at night because sometimes it causes indigestion.She was hospitalized with coffee ground emesis. She was placed on a proton pump inhibitor. She has one episode of vomiting since leaving the hospital.She has had no vomiting recently.

## 2019-01-23 NOTE — DISCUSSION/SUMMARY
[FreeTextEntry1] : The patient was examined. Her blood pressure was 124/80, and her pulse was 70. The remainder of her physical exam was unremarkable except for 3/6 systolic ejection murmur in the aortic area. The patient requires a  walker or wheelchair.. 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  No acute changes were  seen.. The  patient  should stay on the same medicines,  and return in 3 months, or  earlier if needed.

## 2019-01-23 NOTE — REVIEW OF SYSTEMS
[Loss Of Hearing] : hearing loss [Itching] : itching [see HPI] : see HPI [Dizziness] : dizziness [Negative] : Heme/Lymph [Shortness Of Breath] : no shortness of breath [Chest Pain] : no chest pain [Palpitations] : no palpitations [Vomiting] : no vomiting [Heartburn] : no heartburn

## 2019-05-08 ENCOUNTER — NON-APPOINTMENT (OUTPATIENT)
Age: 84
End: 2019-05-08

## 2019-05-08 ENCOUNTER — APPOINTMENT (OUTPATIENT)
Dept: CARDIOLOGY | Facility: CLINIC | Age: 84
End: 2019-05-08
Payer: MEDICARE

## 2019-05-08 VITALS
HEIGHT: 64 IN | SYSTOLIC BLOOD PRESSURE: 121 MMHG | DIASTOLIC BLOOD PRESSURE: 72 MMHG | HEART RATE: 63 BPM | OXYGEN SATURATION: 95 %

## 2019-05-08 DIAGNOSIS — R74.8 ABNORMAL LEVELS OF OTHER SERUM ENZYMES: ICD-10-CM

## 2019-05-08 PROCEDURE — 93000 ELECTROCARDIOGRAM COMPLETE: CPT

## 2019-05-08 PROCEDURE — 99214 OFFICE O/P EST MOD 30 MIN: CPT

## 2019-05-08 NOTE — PHYSICAL EXAM
[General Appearance - Well Developed] : well developed [Normal Appearance] : normal appearance [General Appearance - Well Nourished] : well nourished [Well Groomed] : well groomed [No Deformities] : no deformities [General Appearance - In No Acute Distress] : no acute distress [Normal Conjunctiva] : the conjunctiva exhibited no abnormalities [Normal Oral Mucosa] : normal oral mucosa [Eyelids - No Xanthelasma] : the eyelids demonstrated no xanthelasmas [No Oral Pallor] : no oral pallor [No Oral Cyanosis] : no oral cyanosis [Normal Jugular Venous V Waves Present] : normal jugular venous V waves present [Normal Jugular Venous A Waves Present] : normal jugular venous A waves present [No Jugular Venous Cevallos A Waves] : no jugular venous cevallos A waves [Exaggerated Use Of Accessory Muscles For Inspiration] : no accessory muscle use [Respiration, Rhythm And Depth] : normal respiratory rhythm and effort [Auscultation Breath Sounds / Voice Sounds] : lungs were clear to auscultation bilaterally [Heart Sounds] : normal S1 and S2 [Heart Rate And Rhythm] : heart rate and rhythm were normal [Abdomen Soft] : soft [Abdomen Tenderness] : non-tender [Abdomen Mass (___ Cm)] : no abdominal mass palpated [Cyanosis, Localized] : no localized cyanosis [Nail Clubbing] : no clubbing of the fingernails [Petechial Hemorrhages (___cm)] : no petechial hemorrhages [Skin Color & Pigmentation] : normal skin color and pigmentation [No Venous Stasis] : no venous stasis [] : no rash [Skin Lesions] : no skin lesions [No Skin Ulcers] : no skin ulcer [No Xanthoma] : no  xanthoma was observed [Affect] : the affect was normal [Oriented To Time, Place, And Person] : oriented to person, place, and time [Mood] : the mood was normal [No Anxiety] : not feeling anxious [FreeTextEntry1] : requires a walker or wheelchair

## 2019-05-08 NOTE — REASON FOR VISIT
[FreeTextEntry1] : The patient comes in for followup of her aortic stenosis, mitral regurgitation, hypercholesterolemia, and spinal stenosis. She has no new complaints. She denies any chest pains, shortness of breath, or palpitations. Her aide reports reflux. She is changed around so that her largest meal of the day is lunch and  not dinner. She doesn't give her the Ensure at night because sometimes it causes indigestion.She was hospitalized with coffee ground emesis. She was placed on a proton pump inhibitor. She has one episode of vomiting since leaving the hospital.She has had no vomiting recently.\par May 8, 2019: Patient celebrated her birthday 2 months ago. She feels well. No new complaints.\par

## 2019-05-08 NOTE — REVIEW OF SYSTEMS
[Loss Of Hearing] : hearing loss [Itching] : itching [see HPI] : see HPI [Dizziness] : dizziness [Negative] : Heme/Lymph [Chest Pain] : no chest pain [Shortness Of Breath] : no shortness of breath [Vomiting] : no vomiting [Palpitations] : no palpitations [Heartburn] : no heartburn

## 2019-05-08 NOTE — DISCUSSION/SUMMARY
[FreeTextEntry1] : The patient was examined. Her blood pressure was 121/72, and her pulse was 63. The remainder of her physical exam was unremarkable except for 3/6 systolic ejection murmur in the aortic area. The patient requires a  walker or wheelchair.. 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  No acute changes were  seen.. The  patient  should stay on the same medicines,  and return in 6 months, or  earlier if needed.

## 2019-05-09 LAB
ALBUMIN SERPL ELPH-MCNC: 4.2 G/DL
ALP BLD-CCNC: 87 U/L
ALT SERPL-CCNC: 13 U/L
ANION GAP SERPL CALC-SCNC: 11 MMOL/L
AST SERPL-CCNC: 15 U/L
BASOPHILS # BLD AUTO: 0.01 K/UL
BASOPHILS NFR BLD AUTO: 0.1 %
BILIRUB SERPL-MCNC: 0.8 MG/DL
BUN SERPL-MCNC: 10 MG/DL
CALCIUM SERPL-MCNC: 9.5 MG/DL
CHLORIDE SERPL-SCNC: 105 MMOL/L
CHOLEST SERPL-MCNC: 252 MG/DL
CHOLEST/HDLC SERPL: 5.6 RATIO
CO2 SERPL-SCNC: 27 MMOL/L
CREAT SERPL-MCNC: 0.71 MG/DL
EOSINOPHIL # BLD AUTO: 0.05 K/UL
EOSINOPHIL NFR BLD AUTO: 0.7 %
ESTIMATED AVERAGE GLUCOSE: 117 MG/DL
GLUCOSE SERPL-MCNC: 90 MG/DL
HBA1C MFR BLD HPLC: 5.7 %
HCT VFR BLD CALC: 40.5 %
HDLC SERPL-MCNC: 45 MG/DL
HGB BLD-MCNC: 12.4 G/DL
IMM GRANULOCYTES NFR BLD AUTO: 0.1 %
LDLC SERPL CALC-MCNC: 180 MG/DL
LYMPHOCYTES # BLD AUTO: 1.47 K/UL
LYMPHOCYTES NFR BLD AUTO: 21.5 %
MAN DIFF?: NORMAL
MCHC RBC-ENTMCNC: 30.5 PG
MCHC RBC-ENTMCNC: 30.6 GM/DL
MCV RBC AUTO: 99.8 FL
MONOCYTES # BLD AUTO: 0.51 K/UL
MONOCYTES NFR BLD AUTO: 7.4 %
NEUTROPHILS # BLD AUTO: 4.8 K/UL
NEUTROPHILS NFR BLD AUTO: 70.2 %
PLATELET # BLD AUTO: 267 K/UL
POTASSIUM SERPL-SCNC: 4.1 MMOL/L
PROT SERPL-MCNC: 7.1 G/DL
RBC # BLD: 4.06 M/UL
RBC # FLD: 13.8 %
SODIUM SERPL-SCNC: 143 MMOL/L
T4 SERPL-MCNC: 7 UG/DL
TRIGL SERPL-MCNC: 133 MG/DL
TSH SERPL-ACNC: 4.1 UIU/ML
WBC # FLD AUTO: 6.85 K/UL

## 2019-07-08 NOTE — ED PROVIDER NOTE - SKIN NEGATIVE STATEMENT, MLM
Spoke to patient and informed patient this is appt was R/S to 7/24/19 at 4:00 pm with Dr Amy Cortez at the [facility], [place] 
no abrasions, no jaundice, no lesions, no pruritis, and no rashes.

## 2019-10-24 ENCOUNTER — APPOINTMENT (OUTPATIENT)
Dept: CARDIOLOGY | Facility: CLINIC | Age: 84
End: 2019-10-24
Payer: MEDICARE

## 2019-10-24 ENCOUNTER — NON-APPOINTMENT (OUTPATIENT)
Age: 84
End: 2019-10-24

## 2019-10-24 VITALS
RESPIRATION RATE: 16 BRPM | HEIGHT: 64 IN | BODY MASS INDEX: 20.49 KG/M2 | DIASTOLIC BLOOD PRESSURE: 80 MMHG | HEART RATE: 67 BPM | OXYGEN SATURATION: 93 % | SYSTOLIC BLOOD PRESSURE: 126 MMHG | WEIGHT: 120 LBS | TEMPERATURE: 98.1 F

## 2019-10-24 VITALS — OXYGEN SATURATION: 96 %

## 2019-10-24 DIAGNOSIS — I35.1 NONRHEUMATIC AORTIC (VALVE) INSUFFICIENCY: ICD-10-CM

## 2019-10-24 DIAGNOSIS — I34.0 NONRHEUMATIC MITRAL (VALVE) INSUFFICIENCY: ICD-10-CM

## 2019-10-24 DIAGNOSIS — Z86.69 PERSONAL HISTORY OF OTHER DISEASES OF THE NERVOUS SYSTEM AND SENSE ORGANS: ICD-10-CM

## 2019-10-24 DIAGNOSIS — Z87.898 PERSONAL HISTORY OF OTHER SPECIFIED CONDITIONS: ICD-10-CM

## 2019-10-24 DIAGNOSIS — I35.0 NONRHEUMATIC AORTIC (VALVE) STENOSIS: ICD-10-CM

## 2019-10-24 PROCEDURE — 99214 OFFICE O/P EST MOD 30 MIN: CPT | Mod: 25

## 2019-10-24 PROCEDURE — 93000 ELECTROCARDIOGRAM COMPLETE: CPT

## 2019-10-24 NOTE — DISCUSSION/SUMMARY
[FreeTextEntry1] : The patient was examined. Her blood pressure was 126/80, and her pulse was 67. The remainder of her physical exam was unremarkable except for 3/6 systolic ejection murmur in the aortic area. The patient requires a  wheelchair.. 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  No acute changes were  seen.. The  patient  should stay on the same medicines,  and return in 6 months, or  earlier if needed.  Even if the patient is in atrial fibrillation or atrial flutter ventricular response rate is normal and because she is such a fall risk I believe that the risk of anticoagulating her outweigh any benefits.\par

## 2019-10-24 NOTE — REASON FOR VISIT
[FreeTextEntry1] : The patient comes in for followup of her aortic stenosis, mitral regurgitation, hypercholesterolemia, and spinal stenosis. She has no new complaints. She denies any chest pains, shortness of breath, or palpitations. Her aide reports reflux. She is changed around so that her largest meal of the day is lunch and  not dinner. She doesn't give her the Ensure at night because sometimes it causes indigestion.She was hospitalized with coffee ground emesis. She was placed on a proton pump inhibitor. She has one episode of vomiting since leaving the hospital.She has had no vomiting recently.\par May 8, 2019: Patient celebrated her birthday 2 months ago. She feels well. No new complaints.\par October 24, 2019: Patient has no new complaints.  She denies any chest pains, shortness of breath, or palpitations.\par \par

## 2019-10-24 NOTE — PHYSICAL EXAM
[Normal Appearance] : normal appearance [General Appearance - Well Developed] : well developed [General Appearance - Well Nourished] : well nourished [Well Groomed] : well groomed [No Deformities] : no deformities [Normal Conjunctiva] : the conjunctiva exhibited no abnormalities [General Appearance - In No Acute Distress] : no acute distress [Eyelids - No Xanthelasma] : the eyelids demonstrated no xanthelasmas [Normal Oral Mucosa] : normal oral mucosa [No Oral Pallor] : no oral pallor [No Oral Cyanosis] : no oral cyanosis [Normal Jugular Venous A Waves Present] : normal jugular venous A waves present [Normal Jugular Venous V Waves Present] : normal jugular venous V waves present [No Jugular Venous Cevallos A Waves] : no jugular venous cevallos A waves [Respiration, Rhythm And Depth] : normal respiratory rhythm and effort [Exaggerated Use Of Accessory Muscles For Inspiration] : no accessory muscle use [Auscultation Breath Sounds / Voice Sounds] : lungs were clear to auscultation bilaterally [Heart Sounds] : normal S1 and S2 [Heart Rate And Rhythm] : heart rate and rhythm were normal [Abdomen Soft] : soft [Abdomen Tenderness] : non-tender [Abdomen Mass (___ Cm)] : no abdominal mass palpated [Cyanosis, Localized] : no localized cyanosis [Nail Clubbing] : no clubbing of the fingernails [Petechial Hemorrhages (___cm)] : no petechial hemorrhages [] : no rash [Skin Color & Pigmentation] : normal skin color and pigmentation [No Venous Stasis] : no venous stasis [Skin Lesions] : no skin lesions [No Skin Ulcers] : no skin ulcer [No Xanthoma] : no  xanthoma was observed [Oriented To Time, Place, And Person] : oriented to person, place, and time [Affect] : the affect was normal [Mood] : the mood was normal [No Anxiety] : not feeling anxious [FreeTextEntry1] : requires a  wheelchair

## 2019-11-01 ENCOUNTER — INPATIENT (INPATIENT)
Facility: HOSPITAL | Age: 84
LOS: 19 days | Discharge: HOME HEALTH | End: 2019-11-21
Attending: STUDENT IN AN ORGANIZED HEALTH CARE EDUCATION/TRAINING PROGRAM | Admitting: STUDENT IN AN ORGANIZED HEALTH CARE EDUCATION/TRAINING PROGRAM
Payer: MEDICARE

## 2019-11-01 VITALS
TEMPERATURE: 99 F | DIASTOLIC BLOOD PRESSURE: 98 MMHG | HEART RATE: 134 BPM | OXYGEN SATURATION: 96 % | RESPIRATION RATE: 22 BRPM | SYSTOLIC BLOOD PRESSURE: 122 MMHG

## 2019-11-01 DIAGNOSIS — J69.0 PNEUMONITIS DUE TO INHALATION OF FOOD AND VOMIT: ICD-10-CM

## 2019-11-01 DIAGNOSIS — Z98.890 OTHER SPECIFIED POSTPROCEDURAL STATES: Chronic | ICD-10-CM

## 2019-11-01 DIAGNOSIS — N39.0 URINARY TRACT INFECTION, SITE NOT SPECIFIED: ICD-10-CM

## 2019-11-01 DIAGNOSIS — Z29.9 ENCOUNTER FOR PROPHYLACTIC MEASURES, UNSPECIFIED: ICD-10-CM

## 2019-11-01 DIAGNOSIS — A41.9 SEPSIS, UNSPECIFIED ORGANISM: ICD-10-CM

## 2019-11-01 DIAGNOSIS — I35.0 NONRHEUMATIC AORTIC (VALVE) STENOSIS: ICD-10-CM

## 2019-11-01 DIAGNOSIS — I48.0 PAROXYSMAL ATRIAL FIBRILLATION: ICD-10-CM

## 2019-11-01 DIAGNOSIS — R09.02 HYPOXEMIA: ICD-10-CM

## 2019-11-01 LAB
ALBUMIN SERPL ELPH-MCNC: 3.7 G/DL — SIGNIFICANT CHANGE UP (ref 3.3–5)
ALP SERPL-CCNC: 75 U/L — SIGNIFICANT CHANGE UP (ref 40–120)
ALT FLD-CCNC: 34 U/L — HIGH (ref 4–33)
AMORPH CRY # UR COMP ASSIST: SIGNIFICANT CHANGE UP (ref 0–0)
ANION GAP SERPL CALC-SCNC: 16 MMO/L — HIGH (ref 7–14)
ANISOCYTOSIS BLD QL: SLIGHT — SIGNIFICANT CHANGE UP
APPEARANCE UR: SIGNIFICANT CHANGE UP
APTT BLD: 29.5 SEC — SIGNIFICANT CHANGE UP (ref 27.5–36.3)
AST SERPL-CCNC: 39 U/L — HIGH (ref 4–32)
B PERT DNA SPEC QL NAA+PROBE: NOT DETECTED — SIGNIFICANT CHANGE UP
BACTERIA # UR AUTO: HIGH
BASE EXCESS BLDV CALC-SCNC: -1.2 MMOL/L — SIGNIFICANT CHANGE UP
BASE EXCESS BLDV CALC-SCNC: 0.3 MMOL/L — SIGNIFICANT CHANGE UP
BASOPHILS # BLD AUTO: 0.04 K/UL — SIGNIFICANT CHANGE UP (ref 0–0.2)
BASOPHILS NFR BLD AUTO: 0.2 % — SIGNIFICANT CHANGE UP (ref 0–2)
BASOPHILS NFR SPEC: 0 % — SIGNIFICANT CHANGE UP (ref 0–2)
BILIRUB SERPL-MCNC: 1.8 MG/DL — HIGH (ref 0.2–1.2)
BILIRUB UR-MCNC: SIGNIFICANT CHANGE UP
BLOOD GAS VENOUS - CREATININE: 0.77 MG/DL — SIGNIFICANT CHANGE UP (ref 0.5–1.3)
BLOOD GAS VENOUS - CREATININE: 0.8 MG/DL — SIGNIFICANT CHANGE UP (ref 0.5–1.3)
BLOOD UR QL VISUAL: HIGH
BUN SERPL-MCNC: 29 MG/DL — HIGH (ref 7–23)
C PNEUM DNA SPEC QL NAA+PROBE: NOT DETECTED — SIGNIFICANT CHANGE UP
CALCIUM SERPL-MCNC: 9.9 MG/DL — SIGNIFICANT CHANGE UP (ref 8.4–10.5)
CHLORIDE BLDV-SCNC: 109 MMOL/L — HIGH (ref 96–108)
CHLORIDE BLDV-SCNC: 112 MMOL/L — HIGH (ref 96–108)
CHLORIDE SERPL-SCNC: 103 MMOL/L — SIGNIFICANT CHANGE UP (ref 98–107)
CO2 SERPL-SCNC: 22 MMOL/L — SIGNIFICANT CHANGE UP (ref 22–31)
COLOR SPEC: YELLOW — SIGNIFICANT CHANGE UP
CREAT SERPL-MCNC: 0.86 MG/DL — SIGNIFICANT CHANGE UP (ref 0.5–1.3)
EOSINOPHIL # BLD AUTO: 0.28 K/UL — SIGNIFICANT CHANGE UP (ref 0–0.5)
EOSINOPHIL NFR BLD AUTO: 1.3 % — SIGNIFICANT CHANGE UP (ref 0–6)
EOSINOPHIL NFR FLD: 0 % — SIGNIFICANT CHANGE UP (ref 0–6)
FLUAV H1 2009 PAND RNA SPEC QL NAA+PROBE: NOT DETECTED — SIGNIFICANT CHANGE UP
FLUAV H1 RNA SPEC QL NAA+PROBE: NOT DETECTED — SIGNIFICANT CHANGE UP
FLUAV H3 RNA SPEC QL NAA+PROBE: NOT DETECTED — SIGNIFICANT CHANGE UP
FLUAV SUBTYP SPEC NAA+PROBE: NOT DETECTED — SIGNIFICANT CHANGE UP
FLUBV RNA SPEC QL NAA+PROBE: NOT DETECTED — SIGNIFICANT CHANGE UP
GAS PNL BLDV: 139 MMOL/L — SIGNIFICANT CHANGE UP (ref 136–146)
GAS PNL BLDV: 141 MMOL/L — SIGNIFICANT CHANGE UP (ref 136–146)
GIANT PLATELETS BLD QL SMEAR: PRESENT — SIGNIFICANT CHANGE UP
GLUCOSE BLDV-MCNC: 142 MG/DL — HIGH (ref 70–99)
GLUCOSE BLDV-MCNC: 210 MG/DL — HIGH (ref 70–99)
GLUCOSE SERPL-MCNC: 153 MG/DL — HIGH (ref 70–99)
GLUCOSE UR-MCNC: NEGATIVE — SIGNIFICANT CHANGE UP
GRAN CASTS # UR COMP ASSIST: SIGNIFICANT CHANGE UP
HADV DNA SPEC QL NAA+PROBE: NOT DETECTED — SIGNIFICANT CHANGE UP
HCO3 BLDV-SCNC: 23 MMOL/L — SIGNIFICANT CHANGE UP (ref 20–27)
HCO3 BLDV-SCNC: 25 MMOL/L — SIGNIFICANT CHANGE UP (ref 20–27)
HCOV PNL SPEC NAA+PROBE: SIGNIFICANT CHANGE UP
HCT VFR BLD CALC: 45.2 % — HIGH (ref 34.5–45)
HCT VFR BLDV CALC: 39.3 % — SIGNIFICANT CHANGE UP (ref 34.5–45)
HCT VFR BLDV CALC: 42.9 % — SIGNIFICANT CHANGE UP (ref 34.5–45)
HGB BLD-MCNC: 13.7 G/DL — SIGNIFICANT CHANGE UP (ref 11.5–15.5)
HGB BLDV-MCNC: 12.8 G/DL — SIGNIFICANT CHANGE UP (ref 11.5–15.5)
HGB BLDV-MCNC: 14 G/DL — SIGNIFICANT CHANGE UP (ref 11.5–15.5)
HMPV RNA SPEC QL NAA+PROBE: NOT DETECTED — SIGNIFICANT CHANGE UP
HPIV1 RNA SPEC QL NAA+PROBE: NOT DETECTED — SIGNIFICANT CHANGE UP
HPIV2 RNA SPEC QL NAA+PROBE: NOT DETECTED — SIGNIFICANT CHANGE UP
HPIV3 RNA SPEC QL NAA+PROBE: NOT DETECTED — SIGNIFICANT CHANGE UP
HPIV4 RNA SPEC QL NAA+PROBE: NOT DETECTED — SIGNIFICANT CHANGE UP
HYALINE CASTS # UR AUTO: SIGNIFICANT CHANGE UP
IMM GRANULOCYTES NFR BLD AUTO: 0.5 % — SIGNIFICANT CHANGE UP (ref 0–1.5)
INR BLD: 1.42 — HIGH (ref 0.88–1.17)
KETONES UR-MCNC: NEGATIVE — SIGNIFICANT CHANGE UP
LACTATE BLDV-MCNC: 2.7 MMOL/L — HIGH (ref 0.5–2)
LACTATE BLDV-MCNC: 3.4 MMOL/L — HIGH (ref 0.5–2)
LEUKOCYTE ESTERASE UR-ACNC: SIGNIFICANT CHANGE UP
LYMPHOCYTES # BLD AUTO: 0.79 K/UL — LOW (ref 1–3.3)
LYMPHOCYTES # BLD AUTO: 3.6 % — LOW (ref 13–44)
LYMPHOCYTES NFR SPEC AUTO: 6 % — LOW (ref 13–44)
MACROCYTES BLD QL: SLIGHT — SIGNIFICANT CHANGE UP
MANUAL SMEAR VERIFICATION: SIGNIFICANT CHANGE UP
MCHC RBC-ENTMCNC: 29.4 PG — SIGNIFICANT CHANGE UP (ref 27–34)
MCHC RBC-ENTMCNC: 30.3 % — LOW (ref 32–36)
MCV RBC AUTO: 97 FL — SIGNIFICANT CHANGE UP (ref 80–100)
MONOCYTES # BLD AUTO: 1.1 K/UL — HIGH (ref 0–0.9)
MONOCYTES NFR BLD AUTO: 5 % — SIGNIFICANT CHANGE UP (ref 2–14)
MONOCYTES NFR BLD: 7 % — SIGNIFICANT CHANGE UP (ref 2–9)
NEUTROPHIL AB SER-ACNC: 70 % — SIGNIFICANT CHANGE UP (ref 43–77)
NEUTROPHILS # BLD AUTO: 19.65 K/UL — HIGH (ref 1.8–7.4)
NEUTROPHILS NFR BLD AUTO: 89.4 % — HIGH (ref 43–77)
NEUTS BAND # BLD: 16 % — HIGH (ref 0–6)
NITRITE UR-MCNC: NEGATIVE — SIGNIFICANT CHANGE UP
NRBC # BLD: 1 /100WBC — SIGNIFICANT CHANGE UP
NRBC # FLD: 0 K/UL — SIGNIFICANT CHANGE UP (ref 0–0)
PCO2 BLDV: 41 MMHG — SIGNIFICANT CHANGE UP (ref 41–51)
PCO2 BLDV: 41 MMHG — SIGNIFICANT CHANGE UP (ref 41–51)
PH BLDV: 7.38 PH — SIGNIFICANT CHANGE UP (ref 7.32–7.43)
PH BLDV: 7.4 PH — SIGNIFICANT CHANGE UP (ref 7.32–7.43)
PH UR: 6 — SIGNIFICANT CHANGE UP (ref 5–8)
PLATELET # BLD AUTO: 260 K/UL — SIGNIFICANT CHANGE UP (ref 150–400)
PLATELET COUNT - ESTIMATE: NORMAL — SIGNIFICANT CHANGE UP
PMV BLD: 11 FL — SIGNIFICANT CHANGE UP (ref 7–13)
PO2 BLDV: 55 MMHG — HIGH (ref 35–40)
PO2 BLDV: 62 MMHG — HIGH (ref 35–40)
POLYCHROMASIA BLD QL SMEAR: SLIGHT — SIGNIFICANT CHANGE UP
POTASSIUM BLDV-SCNC: 3.6 MMOL/L — SIGNIFICANT CHANGE UP (ref 3.4–4.5)
POTASSIUM BLDV-SCNC: 4.3 MMOL/L — SIGNIFICANT CHANGE UP (ref 3.4–4.5)
POTASSIUM SERPL-MCNC: 4 MMOL/L — SIGNIFICANT CHANGE UP (ref 3.5–5.3)
POTASSIUM SERPL-SCNC: 4 MMOL/L — SIGNIFICANT CHANGE UP (ref 3.5–5.3)
PROT SERPL-MCNC: 7.4 G/DL — SIGNIFICANT CHANGE UP (ref 6–8.3)
PROT UR-MCNC: 200 — HIGH
PROTHROM AB SERPL-ACNC: 16.4 SEC — HIGH (ref 9.8–13.1)
RBC # BLD: 4.66 M/UL — SIGNIFICANT CHANGE UP (ref 3.8–5.2)
RBC # FLD: 14.6 % — HIGH (ref 10.3–14.5)
RBC CASTS # UR COMP ASSIST: SIGNIFICANT CHANGE UP (ref 0–?)
RSV RNA SPEC QL NAA+PROBE: NOT DETECTED — SIGNIFICANT CHANGE UP
RV+EV RNA SPEC QL NAA+PROBE: NOT DETECTED — SIGNIFICANT CHANGE UP
SAO2 % BLDV: 86.4 % — HIGH (ref 60–85)
SAO2 % BLDV: 90 % — HIGH (ref 60–85)
SODIUM SERPL-SCNC: 141 MMOL/L — SIGNIFICANT CHANGE UP (ref 135–145)
SP GR SPEC: 1.02 — SIGNIFICANT CHANGE UP (ref 1–1.04)
SQUAMOUS # UR AUTO: SIGNIFICANT CHANGE UP
UROBILINOGEN FLD QL: SIGNIFICANT CHANGE UP
VARIANT LYMPHS # BLD: 1 % — SIGNIFICANT CHANGE UP
WBC # BLD: 21.97 K/UL — HIGH (ref 3.8–10.5)
WBC # FLD AUTO: 21.97 K/UL — HIGH (ref 3.8–10.5)
WBC UR QL: >50 — HIGH (ref 0–?)

## 2019-11-01 PROCEDURE — 71045 X-RAY EXAM CHEST 1 VIEW: CPT | Mod: 26

## 2019-11-01 PROCEDURE — 99223 1ST HOSP IP/OBS HIGH 75: CPT | Mod: GC,AI

## 2019-11-01 RX ORDER — SODIUM CHLORIDE 9 MG/ML
500 INJECTION INTRAMUSCULAR; INTRAVENOUS; SUBCUTANEOUS ONCE
Refills: 0 | Status: COMPLETED | OUTPATIENT
Start: 2019-11-01 | End: 2019-11-01

## 2019-11-01 RX ORDER — VANCOMYCIN HCL 1 G
1000 VIAL (EA) INTRAVENOUS ONCE
Refills: 0 | Status: COMPLETED | OUTPATIENT
Start: 2019-11-01 | End: 2019-11-01

## 2019-11-01 RX ORDER — ACETAMINOPHEN 500 MG
650 TABLET ORAL EVERY 6 HOURS
Refills: 0 | Status: DISCONTINUED | OUTPATIENT
Start: 2019-11-01 | End: 2019-11-21

## 2019-11-01 RX ORDER — ACETAMINOPHEN 500 MG
1000 TABLET ORAL ONCE
Refills: 0 | Status: COMPLETED | OUTPATIENT
Start: 2019-11-01 | End: 2019-11-01

## 2019-11-01 RX ORDER — PIPERACILLIN AND TAZOBACTAM 4; .5 G/20ML; G/20ML
3.38 INJECTION, POWDER, LYOPHILIZED, FOR SOLUTION INTRAVENOUS EVERY 8 HOURS
Refills: 0 | Status: DISCONTINUED | OUTPATIENT
Start: 2019-11-01 | End: 2019-11-08

## 2019-11-01 RX ORDER — SODIUM CHLORIDE 9 MG/ML
1000 INJECTION, SOLUTION INTRAVENOUS
Refills: 0 | Status: DISCONTINUED | OUTPATIENT
Start: 2019-11-01 | End: 2019-11-02

## 2019-11-01 RX ORDER — ENOXAPARIN SODIUM 100 MG/ML
30 INJECTION SUBCUTANEOUS DAILY
Refills: 0 | Status: DISCONTINUED | OUTPATIENT
Start: 2019-11-01 | End: 2019-11-11

## 2019-11-01 RX ORDER — PIPERACILLIN AND TAZOBACTAM 4; .5 G/20ML; G/20ML
3.38 INJECTION, POWDER, LYOPHILIZED, FOR SOLUTION INTRAVENOUS ONCE
Refills: 0 | Status: COMPLETED | OUTPATIENT
Start: 2019-11-01 | End: 2019-11-01

## 2019-11-01 RX ADMIN — SODIUM CHLORIDE 500 MILLILITER(S): 9 INJECTION INTRAMUSCULAR; INTRAVENOUS; SUBCUTANEOUS at 13:00

## 2019-11-01 RX ADMIN — Medication 250 MILLIGRAM(S): at 13:30

## 2019-11-01 RX ADMIN — Medication 400 MILLIGRAM(S): at 14:07

## 2019-11-01 RX ADMIN — PIPERACILLIN AND TAZOBACTAM 25 GRAM(S): 4; .5 INJECTION, POWDER, LYOPHILIZED, FOR SOLUTION INTRAVENOUS at 23:08

## 2019-11-01 RX ADMIN — SODIUM CHLORIDE 50 MILLILITER(S): 9 INJECTION, SOLUTION INTRAVENOUS at 23:08

## 2019-11-01 RX ADMIN — PIPERACILLIN AND TAZOBACTAM 200 GRAM(S): 4; .5 INJECTION, POWDER, LYOPHILIZED, FOR SOLUTION INTRAVENOUS at 12:30

## 2019-11-01 NOTE — ED PROVIDER NOTE - PROGRESS NOTE DETAILS
Samuel Mosher D.O., PGY1 (Resident)  Patient placed on 4L O2 NC. Sat 85 ->94 Samuel Mosher D.O., PGY1 (Resident)  Rectal temp 102F. Will be starting vanc, zosyn and giving 500c bolus of NS due to AS murmur, age of 89, and rales on exam. Will reassess. Samuel Mosher D.O., PGY1: ED Provider Sepsis Reassessment Note:   VS:  BP, HR, RR, T, POx.  Patient evaluated after fluid administration:  -LUNGS: bilateral rales; CV: irregularly irregular, AS murmur. DP pulse 2+ b/l; SKIN- Cap. refill 2-3 s; EXTREMITIES: LE warm, no edema  Patient responding to resuscitation. Samuel Mosher D.O., PGY1: ED Provider Sepsis Reassessment Note:   VS: 118/64 BP, 111 HR, 24 RR, 102.5 T, 100% on 2L POx.  Patient evaluated after fluid administration:  -LUNGS: bilateral rales; CV: irregularly irregular, AS murmur. DP pulse 2+ b/l; SKIN- Cap. refill 2-3 s; EXTREMITIES: LE warm, no edema  Patient responding to resuscitation.

## 2019-11-01 NOTE — H&P ADULT - NSHPREVIEWOFSYSTEMS_GEN_ALL_CORE
General: Denies dizziness, fatigue  Eyes: Denies blurry vision  ENMT: Denies rhinorrhea  Respiratory: +Cough, SOB  Cardiovascular: Denies palpitations, CP  Gastrointestinal: Denies abd pain, N/V/D/C, hematochezia, melena  : Denies dysuria, increased freq  Musculoskeletal: Denies edema, joint pain  Endocrine: Denies increased thirst, increased frequency

## 2019-11-01 NOTE — ED PROVIDER NOTE - ATTENDING CONTRIBUTION TO CARE
Attending note:   After face to face evaluation of this patient, I concur with above noted hx, pe, and care plan for this patient.  89 y/o F with no med hx with coughing for weeks and warmth felt by aide today.   Pox 85 RA, 95 4L ox.;   +edmar rales, left>right     +AS murmur.    Treatment in progress

## 2019-11-01 NOTE — ED ADULT NURSE NOTE - OBJECTIVE STATEMENT
Received pt to room 10 A&Ox1 on assessment, HHA reports has had URI symptoms worsening x 1 week with acute AMS since this am. Noted to be tachypneic on assessment, supplemental O2 applied. Skin noted to be intact, rectal temp obtained, PIV x 1 in place per EMS, second PIV placed, straight cath obtained using sterile technique, will continue to monitor.

## 2019-11-01 NOTE — H&P ADULT - ASSESSMENT
89 yo F with PMH of dementia (AAOx2 at baseline) presenting with weakness and SOB x 1 day, found to have positive UA, admitted for sepsis likely 2/2 UTI. 89 yo F with PMH of dementia (AAOx2 at baseline) presenting with weakness and SOB x 1 day, found to have positive UA, admitted for sepsis likely 2/2 UTI vs asp PNA, also found to have Afib with RVR.

## 2019-11-01 NOTE — ED PROVIDER NOTE - CLINICAL SUMMARY MEDICAL DECISION MAKING FREE TEXT BOX
+Dyspnea, age 89, edmar rales, hypoxia +Dyspnea, age 89, edmar rales, hypoxia,    Samuel Mosher D.O., PGY1 (Resident)  89 y.o. female hx of afib, presents to the ED with shortness of breath, weakness, feeling warm, productive cough, tachycardic, tachypneic, hypoxic, exam showing rales L>R, concern for infection vs heart failure vs electrolyte imbalance. Will get sepsis labs including bnp and RVP and reassess.

## 2019-11-01 NOTE — H&P ADULT - PROBLEM SELECTOR PLAN 1
febrile, tachy, elevated WBC. UA positive. CXR with possible L effusion. RVP negative.   - sepsis may be 2/2 UTI vs aspiration PNA given the h/o of coughing with PO intake  - c/w zosyn, would hold off on vanc for now (received 1g in ED) but if pt febrile o/n would restart febrile, tachy, elevated WBC. UA positive. CXR with possible L effusion. RVP negative.   - sepsis may be 2/2 UTI vs aspiration PNA given the h/o of coughing with PO intake  - c/w zosyn, would hold off on vanc for now (received 1g in ED) but if pt febrile o/n would restart  - f/u cultures

## 2019-11-01 NOTE — ED PROVIDER NOTE - NS ED ROS FT
CONSTITUTIONAL: WEAKNESS, WARM. No chills, fatigue  Nose: No nasal congestion, runny nose  MOUTH/THROAT: No sore throat, painful swallowing, lumps on neck  CV: No chest pain, palpitations  PULM: DIFFICULTY BREATHING, COUGH  GI: No abdominal pain, nausea  : No dysuria  MSK: No muscle aches, joint aches  NEURO: no headache, numbness, tingling

## 2019-11-01 NOTE — H&P ADULT - PROBLEM SELECTOR PLAN 6
2/2 PNA  - c/w supplemental O2, wean as tolerated  - if any concern for fluid overload, would trial low dose of lasix  - elevated BNP, likely has diastolic dysfunction

## 2019-11-01 NOTE — H&P ADULT - NSHPPHYSICALEXAM_GEN_ALL_CORE
T(C): 36.9 (01 Nov 2019 15:30), Max: 39.2 (01 Nov 2019 12:46)  T(F): 98.4 (01 Nov 2019 15:30), Max: 102.5 (01 Nov 2019 12:46)  HR: 102 (01 Nov 2019 15:30) (102 - 134)  BP: 117/84 (01 Nov 2019 15:30) (117/84 - 138/88)  RR: 22 (01 Nov 2019 15:30) (22 - 26)  SpO2: 96% (01 Nov 2019 15:30) (96% - 100%)    PHYSICAL EXAM:  GENERAL: No acute distress, well-developed  HEAD:  Atraumatic, Normocephalic  EYES: EOMI, PERRLA, conjunctiva and sclera clear  NECK: Supple, no lymphadenopathy  CHEST/LUNG:   HEART:   ABDOMEN: Soft, non-tender, non-distended; normal bowel sounds, no organomegaly  EXTREMITIES:  2+ peripheral pulses b/l, No clubbing, cyanosis, or edema  NEUROLOGY: A&O  SKIN: No rashes or lesions T(C): 36.9 (01 Nov 2019 15:30), Max: 39.2 (01 Nov 2019 12:46)  T(F): 98.4 (01 Nov 2019 15:30), Max: 102.5 (01 Nov 2019 12:46)  HR: 102 (01 Nov 2019 15:30) (102 - 134)  BP: 117/84 (01 Nov 2019 15:30) (117/84 - 138/88)  RR: 22 (01 Nov 2019 15:30) (22 - 26)  SpO2: 96% (01 Nov 2019 15:30) (96% - 100%)    PHYSICAL EXAM:  GENERAL: No acute distress, well-developed  HEAD:  Atraumatic, Normocephalic  EYES: EOMI, PERRLA, conjunctiva and sclera clear  NECK: Supple, no lymphadenopathy  CHEST/LUNG: coarse breath sounds anteriorly, no wheezing appreciated   HEART: irregularly irregular. systolic murmur radiate to carotids  ABDOMEN: Soft, non-tender, non-distended; normal bowel sounds, no organomegaly  EXTREMITIES:  2+ peripheral pulses b/l, No clubbing, cyanosis, or edema  NEUROLOGY: A&O  SKIN: No rashes or lesions

## 2019-11-01 NOTE — H&P ADULT - PROBLEM SELECTOR PLAN 4
CHADsVASc 3. Afib likely driven by sepsis.   - will monitor off AC for now; pt with h/o GIB in past   - soft BPs, will hold off on rate control, HR currently in the 90s.

## 2019-11-01 NOTE — H&P ADULT - NSHPSOCIALHISTORY_GEN_ALL_CORE
Has 24/7 HHA. No smoking, alcohol, or recreational drug use. Lives alone but has 24/7 HHA. Has 2 sons. No smoking, alcohol, or recreational drug use.

## 2019-11-01 NOTE — H&P ADULT - NSHPLABSRESULTS_GEN_ALL_CORE
13.7   21.97 )-----------( 260      ( 2019 12:30 )             45.2     141  |  103  |  29<H>  ----------------------------<  153<H>  4.0   |  22  |  0.86    Ca    9.9      2019 12:30    TPro  7.4  /  Alb  3.7  /  TBili  1.8<H>  /  DBili  x   /  AST  39<H>  /  ALT  34<H>  /  AlkPhos  75      PT/INR - ( 2019 13:45 )   PT: 16.4 SEC;   INR: 1.42       PTT - ( 2019 13:45 )  PTT:29.5 SEC    Urinalysis Basic - ( 2019 12:30 )  Color: YELLOW / Appearance: Lt TURBID / S.025 / pH: 6.0  Gluc: NEGATIVE / Ketone: NEGATIVE  / Bili: TRACE / Urobili: TRACE   Blood: MODERATE / Protein: 200 / Nitrite: NEGATIVE   Leuk Esterase: LARGE / RBC: 3-5 / WBC >50   Sq Epi: FEW / Non Sq Epi: x / Bacteria: MANY    Blood Gas Venous - Lactate: 2.7 <- 3.4    Serum Pro-Brain Natriuretic Peptide: 80363 pg/mL ( @ 12:30)      RADIOLOGY, EKG & ADDITIONAL TESTS:  < from: Xray Chest 1 View- PORTABLE-Urgent (19 @ 13:18) >  INTERPRETATION:   Hazy left lung base obscuring the hemidiaphragm may represent small   effusion with underlying atelectasis. Left upper lung field and the right   lung are free of any focal abnormalities heart is difficult to evaluate   on this AP rotated image.    COMPARISON:  None available    IMPRESSION:  Possible small left effusion. 13.7   21.97 )-----------( 260      ( 2019 12:30 )             45.2     141  |  103  |  29<H>  ----------------------------<  153<H>  4.0   |  22  |  0.86    Ca    9.9      2019 12:30    TPro  7.4  /  Alb  3.7  /  TBili  1.8<H>  /  DBili  x   /  AST  39<H>  /  ALT  34<H>  /  AlkPhos  75      PT/INR - ( 2019 13:45 )   PT: 16.4 SEC;   INR: 1.42       PTT - ( 2019 13:45 )  PTT:29.5 SEC    Urinalysis Basic - ( 2019 12:30 )  Color: YELLOW / Appearance: Lt TURBID / S.025 / pH: 6.0  Gluc: NEGATIVE / Ketone: NEGATIVE  / Bili: TRACE / Urobili: TRACE   Blood: MODERATE / Protein: 200 / Nitrite: NEGATIVE   Leuk Esterase: LARGE / RBC: 3-5 / WBC >50   Sq Epi: FEW / Non Sq Epi: x / Bacteria: MANY    Blood Gas Venous - Lactate: 2.7 <- 3.4    Serum Pro-Brain Natriuretic Peptide: 79603 pg/mL ( @ 12:30)      RADIOLOGY, EKG & ADDITIONAL TESTS:  < from: Xray Chest 1 View- PORTABLE-Urgent (19 @ 13:18) >  INTERPRETATION:   Hazy left lung base obscuring the hemidiaphragm may represent small   effusion with underlying atelectasis. Left upper lung field and the right   lung are free of any focal abnormalities heart is difficult to evaluate   on this AP rotated image.  COMPARISON:  None available  IMPRESSION:  Possible small left effusion.    EKG: reviewed. , QTc 444. Afib with RVR.

## 2019-11-01 NOTE — ED PROVIDER NOTE - OBJECTIVE STATEMENT
89 y.o. female no sig past medical history presenting to the ED for slow respirations. 89 y.o. female hx of afib, dementia, presenting to the ED for weakness and difficultly breathing noticed today. Home health aide at beside who endorses patient has also been feeling warm and having a productive cough for the past 5 weeks. HHA denies patient having chills, complaining of chest pain, abdominal pain, urinary complaints. Code status unknown. 89 y.o. female hx of afib, dementia, presenting to the ED for weakness and difficultly breathing noticed today. Home health aide at beside who endorses patient has also been feeling warm and having a productive cough for the past 5 weeks. HHA denies patient having chills, complaining of chest pain, abdominal pain, urinary complaints. Code status unknown..

## 2019-11-01 NOTE — ED PROVIDER NOTE - PHYSICAL EXAMINATION
GENERAL: elderly female, sitting in bed, not moving much. Tachycardic, tachypneic, hypoxic, otherwise Vital signs are within normal limits  HEENT: moist mucous membranes  LUNG: rales left > right, good respiratory effort  CV: irregularly irregular, AS murmur, Pulses- Radial: 2+ b/l  ABDOMEN: Soft, NTND, no rebound or guarding  SKIN: Warm, dry, well perfused, no evidence of rash GENERAL: elderly female, sitting in bed, not moving much. Tachycardic, tachypneic, hypoxic, otherwise Vital signs are within normal limits  HEENT: dry mucous membranes  LUNG: rales left > right, good respiratory effort  CV: irregularly irregular, AS murmur, Pulses- Radial: 2+ bilateral, cap refil > 3seconds  ABDOMEN: Soft, NTND, no rebound or guarding  SKIN: Warm, dry, well perfused, no evidence of rash GENERAL: elderly female, sitting in bed, not moving much. Tachycardic, tachypneic, hypoxic, rectally febrile 102F, BP 100s/70s.  HEENT: dry mucous membranes  LUNG: rales left > right, good respiratory effort  CV: irregularly irregular, AS murmur, Pulses- Radial: 2+ bilateral, cap refil > 3seconds  ABDOMEN: Soft, NTND, no rebound or guarding  SKIN: Warm, dry, well perfused, no evidence of rash

## 2019-11-01 NOTE — H&P ADULT - HISTORY OF PRESENT ILLNESS
89 yo F with PMH of dementia (AAOx2 at baseline) presenting with weakness and SOB x 1 day. Patient has 24/7 HHA who noticed that patient had SOB earlier today, appeared uncomfortable. She felt intermittently hot and cold, but no notable rigors. Patient able to ambulate with walker at baseline, uses wheelchair when outdoors. No noted fevers or chills. Has intermittent urine and fecal incontinence, uses diapers. No noted diarrhea or constipation, has BM every 2-3 days, last one was Tuesday or Wednesday. Denies any changes in BM or urine output. No noted orthopnea but per aide, they never lay patient flat on her back. Noted that patient has had a cough for about 5 weeks that worsened yesterday, productive of whitish sputum. Patient does not have a known history of afib per family, nor a history of asthma/COPD or other lung problems.     In ED vitals: T 98.6 (Tm 102.5 rectally), , /98, RR 22, O2 96% on 2L.   Labs notable for leukocytosis of 21.97, elevated pro-BNP 32742, and elevated lacate 3.4->2.7.  Given tylenol 1g, zosyn, vanc, 500cc NS x2. 89 yo F with PMH of dementia (AAOx2 at baseline) presenting with weakness and SOB x 1 day. Patient has 24/7 HHA who noticed that patient had SOB earlier today, appeared uncomfortable. She felt intermittently hot and cold, but no notable rigors. Patient able to ambulate with walker at baseline, uses wheelchair when outdoors. No noted fevers or chills. Has intermittent urine and fecal incontinence, uses diapers. No noted diarrhea or constipation, has BM every 2-3 days, last one was Tuesday or Wednesday. Denies any changes in BM or urine output. No noted orthopnea but per aide, they never lay patient flat on her back. Noted that patient has had a cough for about 5 weeks that worsened yesterday, productive of whitish sputum. Patient does not have a known history of afib per family, nor a history of asthma/COPD or other lung problems. Aide has noticed that patient coughs when she eats or drinks too quickly.    In ED vitals: T 98.6 (Tm 102.5 rectally), , /98, RR 22, O2 96% on 2L.   Labs notable for leukocytosis of 21.97, elevated pro-BNP 87786, and elevated lacate 3.4->2.7.  Given tylenol 1g, zosyn, vanc, 500cc NS x2.

## 2019-11-02 LAB
ANION GAP SERPL CALC-SCNC: 13 MMO/L — SIGNIFICANT CHANGE UP (ref 7–14)
BASE EXCESS BLDV CALC-SCNC: 1.5 MMOL/L — SIGNIFICANT CHANGE UP
BASOPHILS # BLD AUTO: 0.02 K/UL — SIGNIFICANT CHANGE UP (ref 0–0.2)
BASOPHILS NFR BLD AUTO: 0.1 % — SIGNIFICANT CHANGE UP (ref 0–2)
BUN SERPL-MCNC: 23 MG/DL — SIGNIFICANT CHANGE UP (ref 7–23)
CALCIUM SERPL-MCNC: 9.4 MG/DL — SIGNIFICANT CHANGE UP (ref 8.4–10.5)
CHLORIDE SERPL-SCNC: 107 MMOL/L — SIGNIFICANT CHANGE UP (ref 98–107)
CO2 SERPL-SCNC: 22 MMOL/L — SIGNIFICANT CHANGE UP (ref 22–31)
CREAT SERPL-MCNC: 0.6 MG/DL — SIGNIFICANT CHANGE UP (ref 0.5–1.3)
EOSINOPHIL # BLD AUTO: 0.33 K/UL — SIGNIFICANT CHANGE UP (ref 0–0.5)
EOSINOPHIL NFR BLD AUTO: 1.9 % — SIGNIFICANT CHANGE UP (ref 0–6)
GAS PNL BLDV: 139 MMOL/L — SIGNIFICANT CHANGE UP (ref 136–146)
GLUCOSE BLDV-MCNC: 148 MG/DL — HIGH (ref 70–99)
GLUCOSE SERPL-MCNC: 122 MG/DL — HIGH (ref 70–99)
HCO3 BLDV-SCNC: 26 MMOL/L — SIGNIFICANT CHANGE UP (ref 20–27)
HCT VFR BLD CALC: 37.9 % — SIGNIFICANT CHANGE UP (ref 34.5–45)
HCT VFR BLDV CALC: 36.2 % — SIGNIFICANT CHANGE UP (ref 34.5–45)
HGB BLD-MCNC: 11.8 G/DL — SIGNIFICANT CHANGE UP (ref 11.5–15.5)
HGB BLDV-MCNC: 11.8 G/DL — SIGNIFICANT CHANGE UP (ref 11.5–15.5)
IMM GRANULOCYTES NFR BLD AUTO: 0.3 % — SIGNIFICANT CHANGE UP (ref 0–1.5)
LACTATE SERPL-SCNC: 1.8 MMOL/L — SIGNIFICANT CHANGE UP (ref 0.5–2)
LYMPHOCYTES # BLD AUTO: 0.55 K/UL — LOW (ref 1–3.3)
LYMPHOCYTES # BLD AUTO: 3.2 % — LOW (ref 13–44)
MANUAL SMEAR VERIFICATION: SIGNIFICANT CHANGE UP
MCHC RBC-ENTMCNC: 30.3 PG — SIGNIFICANT CHANGE UP (ref 27–34)
MCHC RBC-ENTMCNC: 31.1 % — LOW (ref 32–36)
MCV RBC AUTO: 97.2 FL — SIGNIFICANT CHANGE UP (ref 80–100)
METHOD TYPE: SIGNIFICANT CHANGE UP
MONOCYTES # BLD AUTO: 0.78 K/UL — SIGNIFICANT CHANGE UP (ref 0–0.9)
MONOCYTES NFR BLD AUTO: 4.5 % — SIGNIFICANT CHANGE UP (ref 2–14)
NEUTROPHILS # BLD AUTO: 15.43 K/UL — HIGH (ref 1.8–7.4)
NEUTROPHILS NFR BLD AUTO: 90 % — HIGH (ref 43–77)
NRBC # FLD: 0 K/UL — SIGNIFICANT CHANGE UP (ref 0–0)
ORGANISM # SPEC MICROSCOPIC CNT: SIGNIFICANT CHANGE UP
ORGANISM # SPEC MICROSCOPIC CNT: SIGNIFICANT CHANGE UP
PCO2 BLDV: 39 MMHG — LOW (ref 41–51)
PH BLDV: 7.43 PH — SIGNIFICANT CHANGE UP (ref 7.32–7.43)
PLATELET # BLD AUTO: 195 K/UL — SIGNIFICANT CHANGE UP (ref 150–400)
PMV BLD: 10.5 FL — SIGNIFICANT CHANGE UP (ref 7–13)
PO2 BLDV: 68 MMHG — HIGH (ref 35–40)
POTASSIUM BLDV-SCNC: 3.4 MMOL/L — SIGNIFICANT CHANGE UP (ref 3.4–4.5)
POTASSIUM SERPL-MCNC: 3.6 MMOL/L — SIGNIFICANT CHANGE UP (ref 3.5–5.3)
POTASSIUM SERPL-SCNC: 3.6 MMOL/L — SIGNIFICANT CHANGE UP (ref 3.5–5.3)
RBC # BLD: 3.9 M/UL — SIGNIFICANT CHANGE UP (ref 3.8–5.2)
RBC # FLD: 14.6 % — HIGH (ref 10.3–14.5)
SAO2 % BLDV: 93.6 % — HIGH (ref 60–85)
SODIUM SERPL-SCNC: 142 MMOL/L — SIGNIFICANT CHANGE UP (ref 135–145)
SPECIMEN SOURCE: SIGNIFICANT CHANGE UP
WBC # BLD: 17.16 K/UL — HIGH (ref 3.8–10.5)
WBC # FLD AUTO: 17.16 K/UL — HIGH (ref 3.8–10.5)

## 2019-11-02 PROCEDURE — 99233 SBSQ HOSP IP/OBS HIGH 50: CPT | Mod: GC

## 2019-11-02 PROCEDURE — 74018 RADEX ABDOMEN 1 VIEW: CPT | Mod: 26

## 2019-11-02 PROCEDURE — 71045 X-RAY EXAM CHEST 1 VIEW: CPT | Mod: 26

## 2019-11-02 RX ORDER — IPRATROPIUM/ALBUTEROL SULFATE 18-103MCG
3 AEROSOL WITH ADAPTER (GRAM) INHALATION EVERY 6 HOURS
Refills: 0 | Status: DISCONTINUED | OUTPATIENT
Start: 2019-11-02 | End: 2019-11-21

## 2019-11-02 RX ORDER — INFLUENZA VIRUS VACCINE 15; 15; 15; 15 UG/.5ML; UG/.5ML; UG/.5ML; UG/.5ML
0.5 SUSPENSION INTRAMUSCULAR ONCE
Refills: 0 | Status: COMPLETED | OUTPATIENT
Start: 2019-11-02 | End: 2019-11-21

## 2019-11-02 RX ORDER — VANCOMYCIN HCL 1 G
1000 VIAL (EA) INTRAVENOUS EVERY 24 HOURS
Refills: 0 | Status: DISCONTINUED | OUTPATIENT
Start: 2019-11-02 | End: 2019-11-03

## 2019-11-02 RX ORDER — VANCOMYCIN HCL 1 G
1000 VIAL (EA) INTRAVENOUS EVERY 12 HOURS
Refills: 0 | Status: DISCONTINUED | OUTPATIENT
Start: 2019-11-02 | End: 2019-11-02

## 2019-11-02 RX ADMIN — Medication 3 MILLILITER(S): at 15:30

## 2019-11-02 RX ADMIN — PIPERACILLIN AND TAZOBACTAM 25 GRAM(S): 4; .5 INJECTION, POWDER, LYOPHILIZED, FOR SOLUTION INTRAVENOUS at 22:00

## 2019-11-02 RX ADMIN — Medication 10 MILLIGRAM(S): at 15:01

## 2019-11-02 RX ADMIN — PIPERACILLIN AND TAZOBACTAM 25 GRAM(S): 4; .5 INJECTION, POWDER, LYOPHILIZED, FOR SOLUTION INTRAVENOUS at 06:50

## 2019-11-02 RX ADMIN — Medication 3 MILLILITER(S): at 22:09

## 2019-11-02 RX ADMIN — ENOXAPARIN SODIUM 30 MILLIGRAM(S): 100 INJECTION SUBCUTANEOUS at 12:23

## 2019-11-02 RX ADMIN — Medication 3 MILLILITER(S): at 07:41

## 2019-11-02 RX ADMIN — Medication 250 MILLIGRAM(S): at 13:56

## 2019-11-02 RX ADMIN — PIPERACILLIN AND TAZOBACTAM 25 GRAM(S): 4; .5 INJECTION, POWDER, LYOPHILIZED, FOR SOLUTION INTRAVENOUS at 14:18

## 2019-11-02 NOTE — PROGRESS NOTE ADULT - PROBLEM SELECTOR PLAN 1
febrile, tachy, elevated WBC. UA positive. CXR with possible L effusion. RVP negative.   - sepsis may be 2/2 UTI vs aspiration PNA given the h/o of coughing with PO intake  - c/w zosyn, would hold off on vanc for now (received 1g in ED) but if pt febrile o/n would restart  - f/u cultures febrile, tachy, elevated WBC. UA positive. CXR with possible L effusion. RVP negative.   - sepsis may be 2/2 UTI vs aspiration PNA given the h/o of coughing with PO intake  - c/w zosyn, would hold off on vanc for now (received 1g in ED) but if pt febrile o/n would restart  - start vancomycin for G + cocci in cluster for potential MRSA coverage

## 2019-11-02 NOTE — CHART NOTE - NSCHARTNOTEFT_GEN_A_CORE
Spoke with pt's son Ever at bedside.  I explained to him that we are concerned about aspiration risks with regard to feeding the pt.  Given that there will be no s/s evaluation/cinesophagram till Monday/Tuesday, the son Ever agreed to try a dysphagia 1 honey-thickened diet.  I explained to him in layman's terms that even with this diet, there is a risk of aspiration, and that even with the s/s evaluation, they recommend a diet or NPO, after which the next steps are NGT and possible PEG.  Per discussion with the home health aide at bedside, the pt has been having troubling swallowing for the last 3 years and she does cough when she chokes.  Pt is to be fed by aide or PCA if HHA is not present.

## 2019-11-02 NOTE — PROGRESS NOTE ADULT - ASSESSMENT
89 yo F with PMH of dementia (AAOx2 at baseline) presenting with weakness and SOB x 1 day, found to have positive UA, admitted for sepsis likely 2/2 UTI vs asp PNA, also found to have Afib with RVR. 89 yo F with PMH of dementia (AAOx2 at baseline) presenting with weakness and SOB x 1 day, found to have positive UA, admitted for sepsis likely 2/2 UTI vs asp PNA, also found to have Afib with RVR. Speech and swallow evaluation and repeat Bcx in the AM. 89 yo F with PMH of dementia (AAOx2 at baseline) presenting with weakness and SOB x 1 day, found to have positive UA, admitted for sepsis likely 2/2 UTI vs asp PNA, also found to have Afib with RVR. Speech and swallow evaluation, sine esophagram ordered, and repeat Bcx in the AM.

## 2019-11-02 NOTE — PROGRESS NOTE ADULT - SUBJECTIVE AND OBJECTIVE BOX
Marcus Laura MD  PGY 1 Department of Internal Medicine  Pager: [phone]    Patient is a 89y old  Female who presents with a chief complaint of SOB (2019 17:28)      SUBJECTIVE / OVERNIGHT EVENTS: Pt seen and examined. No acute overnight events. Pt reported no fever, chills, CP, SOB, abdominal pain, N/V, urinary or bowel issues, or new joint aches.     MEDICATIONS  (STANDING):  albuterol/ipratropium for Nebulization 3 milliLiter(s) Nebulizer every 6 hours  dextrose 5% + sodium chloride 0.45%. 1000 milliLiter(s) (50 mL/Hr) IV Continuous <Continuous>  enoxaparin Injectable 30 milliGRAM(s) SubCutaneous daily  piperacillin/tazobactam IVPB.. 3.375 Gram(s) IV Intermittent every 8 hours    MEDICATIONS  (PRN):  acetaminophen   Tablet .. 650 milliGRAM(s) Oral every 6 hours PRN Temp greater or equal to 38C (100.4F), Mild Pain (1 - 3), Moderate Pain (4 - 6), Severe Pain (7 - 10)      I&O's Summary      Vital Signs Last 24 Hrs  T(C): 36.6 (2019 06:46), Max: 39.2 (2019 12:46)  T(F): 97.8 (2019 06:46), Max: 102.5 (2019 12:46)  HR: 98 (2019 06:46) (92 - 134)  BP: 112/67 (2019 06:46) (101/68 - 138/88)  BP(mean): --  RR: 18 (2019 06:46) (18 - 26)  SpO2: 94% (2019 21:55) (93% - 100%)    CAPILLARY BLOOD GLUCOSE    PHYSICAL EXAM:  GENERAL: No acute distress, well-developed  HEAD:  Atraumatic, Normocephalic  EYES: EOMI, PERRLA, conjunctiva and sclera clear  NECK: Supple, no lymphadenopathy  CHEST/LUNG: coarse breath sounds anteriorly, no wheezing appreciated   HEART: irregularly irregular. systolic murmur radiate to carotids  ABDOMEN: Soft, non-tender, non-distended; normal bowel sounds, no organomegaly  EXTREMITIES:  2+ peripheral pulses b/l, No clubbing, cyanosis, or edema  NEUROLOGY: A&Ox2  SKIN: No rashes or lesions      LABS:                        13.7   21.97 )-----------( 260      ( 2019 12:30 )             45.2     Auto Eosinophil # 0.28  / Auto Eosinophil % 1.3   / Auto Neutrophil # 19.65 / Auto Neutrophil % 89.4  / BANDS % 16.0         141  |  103  |  29<H>  ----------------------------<  153<H>  4.0   |  22  |  0.86    Ca    9.9      2019 12:30  TPro  7.4  /  Alb  3.7  /  TBili  1.8<H>  /  DBili  x   /  AST  39<H>  /  ALT  34<H>  /  AlkPhos  75  11    PT/INR - ( 2019 13:45 )   PT: 16.4 SEC;   INR: 1.42          PTT - ( 2019 13:45 )  PTT:29.5 SEC      Urinalysis Basic - ( 2019 12:30 )    Color: YELLOW / Appearance: Lt TURBID / S.025 / pH: 6.0  Gluc: NEGATIVE / Ketone: NEGATIVE  / Bili: TRACE / Urobili: TRACE   Blood: MODERATE / Protein: 200 / Nitrite: NEGATIVE   Leuk Esterase: LARGE / RBC: 3-5 / WBC >50   Sq Epi: FEW / Non Sq Epi: x / Bacteria: MANY            RADIOLOGY & ADDITIONAL TESTS:    Imaging Personally Reviewed:    Consultant(s) Notes Reviewed:      Care Discussed with Consultants/Other Providers: Marcus Laura MD  PGY 1 Department of Internal Medicine  Pager: [phone]    Patient is a 89y old  Female who presents with a chief complaint of SOB (2019 17:28)      SUBJECTIVE / OVERNIGHT EVENTS: Pt seen and examined. No acute overnight events. Reports some gurgling of secretions in posterior pharynx in the morning. Pt reported no fever, chills, CP, palpitations, SOB, abdominal pain, N/V, urinary or bowel issues, or new joint aches.     MEDICATIONS  (STANDING):  albuterol/ipratropium for Nebulization 3 milliLiter(s) Nebulizer every 6 hours  dextrose 5% + sodium chloride 0.45%. 1000 milliLiter(s) (50 mL/Hr) IV Continuous <Continuous>  enoxaparin Injectable 30 milliGRAM(s) SubCutaneous daily  piperacillin/tazobactam IVPB.. 3.375 Gram(s) IV Intermittent every 8 hours    MEDICATIONS  (PRN):  acetaminophen   Tablet .. 650 milliGRAM(s) Oral every 6 hours PRN Temp greater or equal to 38C (100.4F), Mild Pain (1 - 3), Moderate Pain (4 - 6), Severe Pain (7 - 10)      I&O's Summary      Vital Signs Last 24 Hrs  T(C): 36.6 (2019 06:46), Max: 39.2 (2019 12:46)  T(F): 97.8 (2019 06:46), Max: 102.5 (2019 12:46)  HR: 98 (2019 06:46) (92 - 134)  BP: 112/67 (2019 06:46) (101/68 - 138/88)  BP(mean): --  RR: 18 (2019 06:46) (18 - 26)  SpO2: 94% (2019 21:55) (93% - 100%)    CAPILLARY BLOOD GLUCOSE    PHYSICAL EXAM:  GENERAL: No acute distress, well-developed  HEAD:  Atraumatic, Normocephalic  EYES: EOMI, PERRLA, conjunctiva and sclera clear  NECK: Supple, no lymphadenopathy  CHEST/LUNG: coarse breath sounds anteriorly, no wheezing appreciated   HEART: irregularly irregular, systolic murmur radiate to carotids  ABDOMEN: Soft, non-tender, non-distended; normal bowel sounds, no organomegaly  EXTREMITIES:  2+ peripheral pulses b/l, No clubbing, cyanosis, or edema  NEUROLOGY: A&Ox2  SKIN: No rashes or lesions      LABS:                        13.7   21.97 )-----------( 260      ( 2019 12:30 )             45.2     Auto Eosinophil # 0.28  / Auto Eosinophil % 1.3   / Auto Neutrophil # 19.65 / Auto Neutrophil % 89.4  / BANDS % 16.0         141  |  103  |  29<H>  ----------------------------<  153<H>  4.0   |  22  |  0.86    Ca    9.9      2019 12:30  TPro  7.4  /  Alb  3.7  /  TBili  1.8<H>  /  DBili  x   /  AST  39<H>  /  ALT  34<H>  /  AlkPhos  75  11    PT/INR - ( 2019 13:45 )   PT: 16.4 SEC;   INR: 1.42          PTT - ( 2019 13:45 )  PTT:29.5 SEC      Urinalysis Basic - ( 2019 12:30 )    Color: YELLOW / Appearance: Lt TURBID / S.025 / pH: 6.0  Gluc: NEGATIVE / Ketone: NEGATIVE  / Bili: TRACE / Urobili: TRACE   Blood: MODERATE / Protein: 200 / Nitrite: NEGATIVE   Leuk Esterase: LARGE / RBC: 3-5 / WBC >50   Sq Epi: FEW / Non Sq Epi: x / Bacteria: MANY        RADIOLOGY & ADDITIONAL TESTS:    Imaging Personally Reviewed:    Consultant(s) Notes Reviewed:      Care Discussed with Consultants/Other Providers: Marcus Laura MD  PGY 1 Department of Internal Medicine  Pager: [phone]    Patient is a 89y old  Female who presents with a chief complaint of SOB (2019 17:28)      SUBJECTIVE / OVERNIGHT EVENTS: Pt seen and examined. No acute overnight events. Reports some gurgling of secretions in posterior pharynx in the morning. Pt reported no fever, chills, CP, palpitations, SOB, abdominal pain, N/V, urinary or bowel issues, or new joint aches.     MEDICATIONS  (STANDING):  albuterol/ipratropium for Nebulization 3 milliLiter(s) Nebulizer every 6 hours  dextrose 5% + sodium chloride 0.45%. 1000 milliLiter(s) (50 mL/Hr) IV Continuous <Continuous>  enoxaparin Injectable 30 milliGRAM(s) SubCutaneous daily  piperacillin/tazobactam IVPB.. 3.375 Gram(s) IV Intermittent every 8 hours    MEDICATIONS  (PRN):  acetaminophen   Tablet .. 650 milliGRAM(s) Oral every 6 hours PRN Temp greater or equal to 38C (100.4F), Mild Pain (1 - 3), Moderate Pain (4 - 6), Severe Pain (7 - 10)      I&O's Summary      Vital Signs Last 24 Hrs  T(C): 36.6 (2019 06:46), Max: 39.2 (2019 12:46)  T(F): 97.8 (2019 06:46), Max: 102.5 (2019 12:46)  HR: 98 (2019 06:46) (92 - 134)  BP: 112/67 (2019 06:46) (101/68 - 138/88)  BP(mean): --  RR: 18 (2019 06:46) (18 - 26)  SpO2: 94% (2019 21:55) (93% - 100%)    CAPILLARY BLOOD GLUCOSE    PHYSICAL EXAM:  GENERAL: No acute distress, well-developed  HEAD:  Atraumatic, Normocephalic  EYES: EOMI, PERRLA, conjunctiva and sclera clear  NECK: Supple, no lymphadenopathy  CHEST/LUNG: coarse breath sounds anteriorly, no wheezing appreciated   HEART: irregularly irregular, systolic murmur radiate to carotids  ABDOMEN: Soft, non-tender, non-distended; normal bowel sounds, no organomegaly  EXTREMITIES:  2+ peripheral pulses b/l, No clubbing, cyanosis, or edema  NEUROLOGY: A&Ox2  SKIN: No rashes or lesions      LABS:                        13.7   21.97 )-----------( 260      ( 2019 12:30 )             45.2     Auto Eosinophil # 0.28  / Auto Eosinophil % 1.3   / Auto Neutrophil # 19.65 / Auto Neutrophil % 89.4  / BANDS % 16.0         141  |  103  |  29<H>  ----------------------------<  153<H>  4.0   |  22  |  0.86    Ca    9.9      2019 12:30  TPro  7.4  /  Alb  3.7  /  TBili  1.8<H>  /  DBili  x   /  AST  39<H>  /  ALT  34<H>  /  AlkPhos  75  11    PT/INR - ( 2019 13:45 )   PT: 16.4 SEC;   INR: 1.42          PTT - ( 2019 13:45 )  PTT:29.5 SEC      Urinalysis Basic - ( 2019 12:30 )    Color: YELLOW / Appearance: Lt TURBID / S.025 / pH: 6.0  Gluc: NEGATIVE / Ketone: NEGATIVE  / Bili: TRACE / Urobili: TRACE   Blood: MODERATE / Protein: 200 / Nitrite: NEGATIVE   Leuk Esterase: LARGE / RBC: 3-5 / WBC >50   Sq Epi: FEW / Non Sq Epi: x / Bacteria: MANY

## 2019-11-02 NOTE — PROGRESS NOTE ADULT - PROBLEM SELECTOR PLAN 7
DVT ppx: lovenox daily  Diet: NPO until S/S eval  Dispo: likely home DVT ppx: lovenox daily  Diet: NPO until S/S eval  Dispo: likely home  Transitions of Care Status:  1.  Name of PCP:   2.  PCP Contacted on Admission: [ ] Y    [ x] N    3.  PCP contacted at Discharge: [ ] Y    [ ] N    [ ] N/A  4.  Post-Discharge Appointment Date and Location:  5.  Summary of Handoff given to PCP:

## 2019-11-02 NOTE — PROGRESS NOTE ADULT - PROBLEM SELECTOR PLAN 3
- will c/w zosyn  - S/S eval  - NPO for now   - asp precautions - will c/w vanc, zosyn  - S/S eval  - NPO for now   - asp precautions

## 2019-11-02 NOTE — CHART NOTE - NSCHARTNOTEFT_GEN_A_CORE
Pt seen and examined at bedside.  Pt complained of vague abdominal pain - no BM since Wednesday - gave dulcolax suppository.  Ordered abdominal x-ray    Pt then complained of vague neck pain - gave 1 g Ofirmev; pt taking very shallow breaths, breathing in her mouth - very poor inspiratory effort but had decent air movement; no accessory muscle use or wheezes/rhonchi/rales appreciated, but also must take into account the very shallow breath.  Will get a repeat CXR to r/o pulmonary edema versus worsening pneumonia, ordered VBG to look at pH, lactate, O2, and CO2 - if abnormal, will check ABG.  Pt denied dyspnea or CP and did not appear to be in acute distress at the time of my examination.  Pt may also have diaphragmatic restriction from a stool burden.

## 2019-11-03 LAB
-  AMIKACIN: SIGNIFICANT CHANGE UP
-  AMPICILLIN/SULBACTAM: SIGNIFICANT CHANGE UP
-  AMPICILLIN: SIGNIFICANT CHANGE UP
-  AZTREONAM: SIGNIFICANT CHANGE UP
-  CEFAZOLIN: SIGNIFICANT CHANGE UP
-  CEFEPIME: SIGNIFICANT CHANGE UP
-  CEFOXITIN: SIGNIFICANT CHANGE UP
-  CEFTAZIDIME: SIGNIFICANT CHANGE UP
-  CEFTRIAXONE: SIGNIFICANT CHANGE UP
-  COAGULASE NEGATIVE STAPHYLOCOCCUS: SIGNIFICANT CHANGE UP
-  ERTAPENEM: SIGNIFICANT CHANGE UP
-  GENTAMICIN: SIGNIFICANT CHANGE UP
-  IMIPENEM: SIGNIFICANT CHANGE UP
-  LEVOFLOXACIN: SIGNIFICANT CHANGE UP
-  MEROPENEM: SIGNIFICANT CHANGE UP
-  NITROFURANTOIN: SIGNIFICANT CHANGE UP
-  PIPERACILLIN/TAZOBACTAM: SIGNIFICANT CHANGE UP
-  TIGECYCLINE: SIGNIFICANT CHANGE UP
-  TOBRAMYCIN: SIGNIFICANT CHANGE UP
-  TRIMETHOPRIM/SULFAMETHOXAZOLE: SIGNIFICANT CHANGE UP
ALBUMIN SERPL ELPH-MCNC: 3 G/DL — LOW (ref 3.3–5)
ALP SERPL-CCNC: 71 U/L — SIGNIFICANT CHANGE UP (ref 40–120)
ALT FLD-CCNC: 19 U/L — SIGNIFICANT CHANGE UP (ref 4–33)
ANION GAP SERPL CALC-SCNC: 15 MMO/L — HIGH (ref 7–14)
AST SERPL-CCNC: 17 U/L — SIGNIFICANT CHANGE UP (ref 4–32)
BACTERIA BLD CULT: SIGNIFICANT CHANGE UP
BACTERIA UR CULT: SIGNIFICANT CHANGE UP
BASOPHILS # BLD AUTO: 0.02 K/UL — SIGNIFICANT CHANGE UP (ref 0–0.2)
BASOPHILS NFR BLD AUTO: 0.1 % — SIGNIFICANT CHANGE UP (ref 0–2)
BILIRUB SERPL-MCNC: 1.2 MG/DL — SIGNIFICANT CHANGE UP (ref 0.2–1.2)
BUN SERPL-MCNC: 21 MG/DL — SIGNIFICANT CHANGE UP (ref 7–23)
CALCIUM SERPL-MCNC: 9.2 MG/DL — SIGNIFICANT CHANGE UP (ref 8.4–10.5)
CHLORIDE SERPL-SCNC: 106 MMOL/L — SIGNIFICANT CHANGE UP (ref 98–107)
CO2 SERPL-SCNC: 24 MMOL/L — SIGNIFICANT CHANGE UP (ref 22–31)
CREAT SERPL-MCNC: 0.63 MG/DL — SIGNIFICANT CHANGE UP (ref 0.5–1.3)
EOSINOPHIL # BLD AUTO: 0 K/UL — SIGNIFICANT CHANGE UP (ref 0–0.5)
EOSINOPHIL NFR BLD AUTO: 0 % — SIGNIFICANT CHANGE UP (ref 0–6)
GLUCOSE SERPL-MCNC: 109 MG/DL — HIGH (ref 70–99)
HCT VFR BLD CALC: 36.8 % — SIGNIFICANT CHANGE UP (ref 34.5–45)
HGB BLD-MCNC: 11.2 G/DL — LOW (ref 11.5–15.5)
IMM GRANULOCYTES NFR BLD AUTO: 0.5 % — SIGNIFICANT CHANGE UP (ref 0–1.5)
LYMPHOCYTES # BLD AUTO: 0.65 K/UL — LOW (ref 1–3.3)
LYMPHOCYTES # BLD AUTO: 4.7 % — LOW (ref 13–44)
MAGNESIUM SERPL-MCNC: 2.2 MG/DL — SIGNIFICANT CHANGE UP (ref 1.6–2.6)
MANUAL SMEAR VERIFICATION: SIGNIFICANT CHANGE UP
MCHC RBC-ENTMCNC: 30.2 PG — SIGNIFICANT CHANGE UP (ref 27–34)
MCHC RBC-ENTMCNC: 30.4 % — LOW (ref 32–36)
MCV RBC AUTO: 99.2 FL — SIGNIFICANT CHANGE UP (ref 80–100)
METHOD TYPE: SIGNIFICANT CHANGE UP
MONOCYTES # BLD AUTO: 0.65 K/UL — SIGNIFICANT CHANGE UP (ref 0–0.9)
MONOCYTES NFR BLD AUTO: 4.7 % — SIGNIFICANT CHANGE UP (ref 2–14)
NEUTROPHILS # BLD AUTO: 12.31 K/UL — HIGH (ref 1.8–7.4)
NEUTROPHILS NFR BLD AUTO: 90 % — HIGH (ref 43–77)
NRBC # FLD: 0 K/UL — SIGNIFICANT CHANGE UP (ref 0–0)
ORGANISM # SPEC MICROSCOPIC CNT: SIGNIFICANT CHANGE UP
PHOSPHATE SERPL-MCNC: 1.7 MG/DL — LOW (ref 2.5–4.5)
PLATELET # BLD AUTO: 208 K/UL — SIGNIFICANT CHANGE UP (ref 150–400)
PMV BLD: 10.9 FL — SIGNIFICANT CHANGE UP (ref 7–13)
POTASSIUM SERPL-MCNC: 3.4 MMOL/L — LOW (ref 3.5–5.3)
POTASSIUM SERPL-SCNC: 3.4 MMOL/L — LOW (ref 3.5–5.3)
PROT SERPL-MCNC: 6.5 G/DL — SIGNIFICANT CHANGE UP (ref 6–8.3)
RBC # BLD: 3.71 M/UL — LOW (ref 3.8–5.2)
RBC # FLD: 14.6 % — HIGH (ref 10.3–14.5)
SODIUM SERPL-SCNC: 145 MMOL/L — SIGNIFICANT CHANGE UP (ref 135–145)
WBC # BLD: 13.7 K/UL — HIGH (ref 3.8–10.5)
WBC # FLD AUTO: 13.7 K/UL — HIGH (ref 3.8–10.5)

## 2019-11-03 PROCEDURE — 99233 SBSQ HOSP IP/OBS HIGH 50: CPT | Mod: GC

## 2019-11-03 RX ORDER — POTASSIUM PHOSPHATE, MONOBASIC POTASSIUM PHOSPHATE, DIBASIC 236; 224 MG/ML; MG/ML
15 INJECTION, SOLUTION INTRAVENOUS ONCE
Refills: 0 | Status: COMPLETED | OUTPATIENT
Start: 2019-11-03 | End: 2019-11-03

## 2019-11-03 RX ORDER — IBUPROFEN 200 MG
400 TABLET ORAL THREE TIMES A DAY
Refills: 0 | Status: DISCONTINUED | OUTPATIENT
Start: 2019-11-03 | End: 2019-11-03

## 2019-11-03 RX ADMIN — Medication 3 MILLILITER(S): at 16:32

## 2019-11-03 RX ADMIN — Medication 400 MILLIGRAM(S): at 08:30

## 2019-11-03 RX ADMIN — Medication 3 MILLILITER(S): at 09:54

## 2019-11-03 RX ADMIN — PIPERACILLIN AND TAZOBACTAM 25 GRAM(S): 4; .5 INJECTION, POWDER, LYOPHILIZED, FOR SOLUTION INTRAVENOUS at 21:46

## 2019-11-03 RX ADMIN — POTASSIUM PHOSPHATE, MONOBASIC POTASSIUM PHOSPHATE, DIBASIC 62.5 MILLIMOLE(S): 236; 224 INJECTION, SOLUTION INTRAVENOUS at 11:04

## 2019-11-03 RX ADMIN — Medication 3 MILLILITER(S): at 21:22

## 2019-11-03 RX ADMIN — ENOXAPARIN SODIUM 30 MILLIGRAM(S): 100 INJECTION SUBCUTANEOUS at 13:03

## 2019-11-03 RX ADMIN — PIPERACILLIN AND TAZOBACTAM 25 GRAM(S): 4; .5 INJECTION, POWDER, LYOPHILIZED, FOR SOLUTION INTRAVENOUS at 06:05

## 2019-11-03 RX ADMIN — PIPERACILLIN AND TAZOBACTAM 25 GRAM(S): 4; .5 INJECTION, POWDER, LYOPHILIZED, FOR SOLUTION INTRAVENOUS at 15:19

## 2019-11-03 RX ADMIN — Medication 400 MILLIGRAM(S): at 07:59

## 2019-11-03 RX ADMIN — Medication 3 MILLILITER(S): at 03:33

## 2019-11-03 NOTE — SWALLOW BEDSIDE ASSESSMENT ADULT - SWALLOW EVAL: DIAGNOSIS
1. Moderate oral phase dysphagia for puree consistency and honey thick liquids marked by reduced stripping of bolus from utensil, reduced bolus manipulation, inappropriate mastication pattern, reduced anterior-posterior transport and delay in oral transit time 2. Severe oral phase dysphagia for thin liquids marked by reduced labial seal with lateral loss, reduced bolus manipulation and reduced anterior-posterior transport with suspected posterior loss of bolus 3. Severe pharyngeal phase dysphagia for puree consistency, honey thick liquids and thin liquids marked by reduced and delay in laryngeal elevation upon digital palpation. Immediate evidence of throat clear and wet vocal quality indicative of laryngeal penetration/aspiration.

## 2019-11-03 NOTE — SWALLOW BEDSIDE ASSESSMENT ADULT - ORAL PHASE
Delayed oral transit time/Decreased anterior-posterior movement of the bolus Decreased anterior-posterior movement of the bolus

## 2019-11-03 NOTE — SWALLOW BEDSIDE ASSESSMENT ADULT - COMMENTS
Patient is a "89 yo F with PMH of dementia (AAOx2 at baseline) presenting with weakness and SOB x 1 day, found to have positive UA, admitted for sepsis likely 2/2 UTI vs asp PNA, also found to have Afib with RVR". Patient is a "89 yo F with PMH of dementia (AAOx2 at baseline) presenting with weakness and SOB x 1 day, found to have positive UA, admitted for sepsis likely 2/2 UTI vs asp PNA, also found to have Afib with RVR". CXR completed on 11/1/19 reported " Patient is a "89 yo F with PMH of dementia (AAOx2 at baseline) presenting with weakness and SOB x 1 day, found to have positive UA, admitted for sepsis likely 2/2 UTI vs asp PNA, also found to have Afib with RVR". CXR completed on 11/1/19 reported "Hazy left lung base obscuring the hemidiaphragm may represent small effusion with underlying atelectasis". As per chart review, patient unable to tolerate PO medications and with h/o coughing with PO and gurgling.     Patient seen upright at bedside with son and HHA present. Patient was alert/awake and verbally responsive to simple questions. Patient able to follow some simple directions.

## 2019-11-03 NOTE — PROGRESS NOTE ADULT - PROBLEM SELECTOR PLAN 1
febrile, tachy, elevated WBC. UA positive. CXR with possible L effusion. RVP negative.   - sepsis may be 2/2 UTI vs aspiration PNA given the h/o of coughing with PO intake (and gurgling)  - c/w vanc, zosyn (11/1- )  - vancomycin for G + cocci in clusters on Bcx for potential MRSA coverage

## 2019-11-03 NOTE — SWALLOW BEDSIDE ASSESSMENT ADULT - PHARYNGEAL PHASE
Delayed pharyngeal swallow/Decreased laryngeal elevation/Throat clear post oral intake/Wet vocal quality post oral intake

## 2019-11-03 NOTE — SWALLOW BEDSIDE ASSESSMENT ADULT - NS ASR SWALLOW FINDINGS DISCUS
Patient/Family/Medical Team; Provided son with rationale/ education on current recommendation. All questions were answered. Son verbalized agreement/Nursing

## 2019-11-03 NOTE — PROGRESS NOTE ADULT - PROBLEM SELECTOR PLAN 7
DVT ppx: lovenox daily  Diet: dysphagia 1 diet per son, until S/S eval  Dispo: likely home  Transitions of Care Status:  1.  Name of PCP:   2.  PCP Contacted on Admission: [ ] Y    [ x] N    3.  PCP contacted at Discharge: [ ] Y    [ ] N    [ ] N/A  4.  Post-Discharge Appointment Date and Location:  5.  Summary of Handoff given to PCP:

## 2019-11-03 NOTE — PROGRESS NOTE ADULT - SUBJECTIVE AND OBJECTIVE BOX
PROGRESS NOTE:     Patient is a 89y old  Female who presents with a chief complaint of SOB (2019 07:37)      SUBJECTIVE / OVERNIGHT EVENTS: Pt seen and examined. No acute overnight events. Pt complained of some left shoulder pain. Pt reported no fever, chills, CP, SOB, abdominal pain, N/V, urinary or bowel issues, or new joint aches.     ADDITIONAL REVIEW OF SYSTEMS:    MEDICATIONS  (STANDING):  albuterol/ipratropium for Nebulization 3 milliLiter(s) Nebulizer every 6 hours  enoxaparin Injectable 30 milliGRAM(s) SubCutaneous daily  influenza   Vaccine 0.5 milliLiter(s) IntraMuscular once  piperacillin/tazobactam IVPB.. 3.375 Gram(s) IV Intermittent every 8 hours  vancomycin  IVPB 1000 milliGRAM(s) IV Intermittent every 24 hours    MEDICATIONS  (PRN):  acetaminophen   Tablet .. 650 milliGRAM(s) Oral every 6 hours PRN Temp greater or equal to 38C (100.4F), Mild Pain (1 - 3), Moderate Pain (4 - 6), Severe Pain (7 - 10)  ibuprofen  Tablet. 400 milliGRAM(s) Oral three times a day PRN Mild Pain (1 - 3), Moderate Pain (4 - 6)      CAPILLARY BLOOD GLUCOSE        I&O's Summary    2019 08:01  -  2019 07:00  --------------------------------------------------------  IN: 1200 mL / OUT: 200 mL / NET: 1000 mL        PHYSICAL EXAM:  Vital Signs Last 24 Hrs  T(C): 36.5 (2019 06:04), Max: 36.6 (2019 01:31)  T(F): 97.7 (2019 06:04), Max: 97.8 (2019 01:31)  HR: 93 (2019 06:04) (92 - 110)  BP: 108/66 (2019 06:04) (98/62 - 110/73)  BP(mean): --  RR: 18 (2019 06:04) (18 - 20)  SpO2: 97% (2019 06:04) (95% - 99%)    CONSTITUTIONAL: NAD, well-developed  RESPIRATORY: Normal respiratory effort; lungs are clear to auscultation bilaterally  CARDIOVASCULAR:  irregularly irregular, systolic murmur radiate to carotids; No lower extremity edema; Peripheral pulses are 2+ bilaterally  ABDOMEN: Nontender to palpation, normoactive bowel sounds, no rebound/guarding; No hepatosplenomegaly  MUSCLOSKELETAL: no clubbing or cyanosis of digits; no joint swelling. No erythema or lesions over left shoulder, mild pain to palpation, no noticeable deformity  PSYCH: A+O to person, place, and time; affect appropriate    LABS:                        11.8   17.16 )-----------( 195      ( 2019 12:14 )             37.9         142  |  107  |  23  ----------------------------<  122<H>  3.6   |  22  |  0.60    Ca    9.4      2019 12:14    TPro  7.4  /  Alb  3.7  /  TBili  1.8<H>  /  DBili  x   /  AST  39<H>  /  ALT  34<H>  /  AlkPhos  75  11    PT/INR - ( 2019 13:45 )   PT: 16.4 SEC;   INR: 1.42          PTT - ( 2019 13:45 )  PTT:29.5 SEC      Urinalysis Basic - ( 2019 12:30 )    Color: YELLOW / Appearance: Lt TURBID / S.025 / pH: 6.0  Gluc: NEGATIVE / Ketone: NEGATIVE  / Bili: TRACE / Urobili: TRACE   Blood: MODERATE / Protein: 200 / Nitrite: NEGATIVE   Leuk Esterase: LARGE / RBC: 3-5 / WBC >50   Sq Epi: FEW / Non Sq Epi: x / Bacteria: MANY        Culture - Blood (collected 2019 16:55)  Source: Peripheral Site 1  Preliminary Report (2019 16:56):    NO ORGANISMS ISOLATED    NO ORGANISMS ISOLATED AT 24 HOURS    Culture - Blood (collected 2019 16:50)  Source: Peripheral Site 2  Preliminary Report (2019 09:26):    ***Blood Panel PCR results on this specimen are available    approximately 3 hours after the Gram stain result***    Gram stain, PCR, and/or culture results may not always    correspond due to difference in methodologies    ------------------------------------------------------------    This PCR assay was performed using [employer].  The    following targets are tested for:  Enterococcus, vancomycin    resistant enterococci, Listeria monocytogenes,  coagulase    negative staphylococci, S. aureus, methicillin resistant S.    aureus, Streptococcus agalactiae (Group B), S. pneumoniae,    S. pyogenes (Group A), Acinetobacter baumannii, Enterobacter    cloacae, E. coli, Klebsiella oxytoca, K. pneumoniae, Proteus    sp., Serratia marcescens, Haemophilus influenzae, Neisseria    meningitidis, Pseudomonas aeruginosa, Candida albicans, C.    glabrata, C. krusei, C. parapsilosis, C. tropicalis and the    KPC resistance gene.    **NOTE: Due to technical problems, Proteus sp. will NOT be    reported as part of the BC[facility] paneluntil further notice.  Organism: BLOOD CULTURE PCR (2019 09:26)  Organism: BLOOD CULTURE PCR (2019 09:26)    Culture - Urine (collected 2019 13:56)  Source: URINE MIDSTREAM  Preliminary Report (2019 14:03):    EC^Escherichia coli    COLONY COUNT: > = 100,000 CFU/ML  Preliminary Report (2019 12:17):    9%EC    EC^Escherichia coli    COLONY COUNT: > = 100,000 CFU/ML        RADIOLOGY & ADDITIONAL TESTS:  Results Reviewed:   Imaging Personally Reviewed:  Electrocardiogram Personally Reviewed:    COORDINATION OF CARE:  Care Discussed with Consultants/Other Providers [Y/N]:  Prior or Outpatient Records Reviewed [Y/N]:

## 2019-11-03 NOTE — SWALLOW BEDSIDE ASSESSMENT ADULT - SWALLOW EVAL: RECOMMENDED DIET
1. Oral means of nutrition/ hydration/ medication contraindicated given the abovementioned oropharyngeal phase deficits 2. Defer to MD for Nutritional Plan of Care/ Goals of Care discussion with patient and family members

## 2019-11-03 NOTE — PROGRESS NOTE ADULT - ASSESSMENT
89 yo F with PMH of dementia (AAOx2 at baseline) presenting with weakness and SOB x 1 day, found to have positive UA, admitted for sepsis likely 2/2 UTI vs asp PNA, also found to have Afib with RVR. Speech and swallow evaluation, sine esophagram ordered, and repeat Bcx in the AM.

## 2019-11-03 NOTE — PROGRESS NOTE ADULT - PROBLEM SELECTOR PLAN 3
- will c/w vanc, zosyn  - S/S eval  - dysphagia 1 per son, monitor for complications  - asp precautions

## 2019-11-04 LAB
ALBUMIN SERPL ELPH-MCNC: 2.8 G/DL — LOW (ref 3.3–5)
ALP SERPL-CCNC: 80 U/L — SIGNIFICANT CHANGE UP (ref 40–120)
ALT FLD-CCNC: 20 U/L — SIGNIFICANT CHANGE UP (ref 4–33)
ANION GAP SERPL CALC-SCNC: 12 MMO/L — SIGNIFICANT CHANGE UP (ref 7–14)
AST SERPL-CCNC: 21 U/L — SIGNIFICANT CHANGE UP (ref 4–32)
BASOPHILS # BLD AUTO: 0.02 K/UL — SIGNIFICANT CHANGE UP (ref 0–0.2)
BASOPHILS NFR BLD AUTO: 0.1 % — SIGNIFICANT CHANGE UP (ref 0–2)
BILIRUB SERPL-MCNC: 1 MG/DL — SIGNIFICANT CHANGE UP (ref 0.2–1.2)
BUN SERPL-MCNC: 20 MG/DL — SIGNIFICANT CHANGE UP (ref 7–23)
CALCIUM SERPL-MCNC: 9.2 MG/DL — SIGNIFICANT CHANGE UP (ref 8.4–10.5)
CHLORIDE SERPL-SCNC: 108 MMOL/L — HIGH (ref 98–107)
CO2 SERPL-SCNC: 26 MMOL/L — SIGNIFICANT CHANGE UP (ref 22–31)
CREAT SERPL-MCNC: 0.59 MG/DL — SIGNIFICANT CHANGE UP (ref 0.5–1.3)
EOSINOPHIL # BLD AUTO: 0 K/UL — SIGNIFICANT CHANGE UP (ref 0–0.5)
EOSINOPHIL NFR BLD AUTO: 0 % — SIGNIFICANT CHANGE UP (ref 0–6)
GLUCOSE SERPL-MCNC: 105 MG/DL — HIGH (ref 70–99)
HCT VFR BLD CALC: 39 % — SIGNIFICANT CHANGE UP (ref 34.5–45)
HGB BLD-MCNC: 11.6 G/DL — SIGNIFICANT CHANGE UP (ref 11.5–15.5)
IMM GRANULOCYTES NFR BLD AUTO: 0.5 % — SIGNIFICANT CHANGE UP (ref 0–1.5)
LYMPHOCYTES # BLD AUTO: 0.8 K/UL — LOW (ref 1–3.3)
LYMPHOCYTES # BLD AUTO: 5.6 % — LOW (ref 13–44)
MAGNESIUM SERPL-MCNC: 2.3 MG/DL — SIGNIFICANT CHANGE UP (ref 1.6–2.6)
MCHC RBC-ENTMCNC: 29.5 PG — SIGNIFICANT CHANGE UP (ref 27–34)
MCHC RBC-ENTMCNC: 29.7 % — LOW (ref 32–36)
MCV RBC AUTO: 99.2 FL — SIGNIFICANT CHANGE UP (ref 80–100)
MONOCYTES # BLD AUTO: 0.87 K/UL — SIGNIFICANT CHANGE UP (ref 0–0.9)
MONOCYTES NFR BLD AUTO: 6.1 % — SIGNIFICANT CHANGE UP (ref 2–14)
NEUTROPHILS # BLD AUTO: 12.51 K/UL — HIGH (ref 1.8–7.4)
NEUTROPHILS NFR BLD AUTO: 87.7 % — HIGH (ref 43–77)
NRBC # FLD: 0 K/UL — SIGNIFICANT CHANGE UP (ref 0–0)
PHOSPHATE SERPL-MCNC: 2.4 MG/DL — LOW (ref 2.5–4.5)
PLATELET # BLD AUTO: 228 K/UL — SIGNIFICANT CHANGE UP (ref 150–400)
PMV BLD: 11.1 FL — SIGNIFICANT CHANGE UP (ref 7–13)
POTASSIUM SERPL-MCNC: 3.1 MMOL/L — LOW (ref 3.5–5.3)
POTASSIUM SERPL-SCNC: 3.1 MMOL/L — LOW (ref 3.5–5.3)
PROT SERPL-MCNC: 6.6 G/DL — SIGNIFICANT CHANGE UP (ref 6–8.3)
RBC # BLD: 3.93 M/UL — SIGNIFICANT CHANGE UP (ref 3.8–5.2)
RBC # FLD: 14.6 % — HIGH (ref 10.3–14.5)
SODIUM SERPL-SCNC: 146 MMOL/L — HIGH (ref 135–145)
SPECIMEN SOURCE: SIGNIFICANT CHANGE UP
SPECIMEN SOURCE: SIGNIFICANT CHANGE UP
WBC # BLD: 14.27 K/UL — HIGH (ref 3.8–10.5)
WBC # FLD AUTO: 14.27 K/UL — HIGH (ref 3.8–10.5)

## 2019-11-04 PROCEDURE — 99233 SBSQ HOSP IP/OBS HIGH 50: CPT | Mod: GC

## 2019-11-04 RX ORDER — SENNA PLUS 8.6 MG/1
2 TABLET ORAL AT BEDTIME
Refills: 0 | Status: DISCONTINUED | OUTPATIENT
Start: 2019-11-05 | End: 2019-11-21

## 2019-11-04 RX ORDER — SODIUM CHLORIDE 9 MG/ML
3 INJECTION INTRAMUSCULAR; INTRAVENOUS; SUBCUTANEOUS THREE TIMES A DAY
Refills: 0 | Status: DISCONTINUED | OUTPATIENT
Start: 2019-11-04 | End: 2019-11-21

## 2019-11-04 RX ORDER — POLYETHYLENE GLYCOL 3350 17 G/17G
17 POWDER, FOR SOLUTION ORAL DAILY
Refills: 0 | Status: DISCONTINUED | OUTPATIENT
Start: 2019-11-05 | End: 2019-11-16

## 2019-11-04 RX ORDER — POTASSIUM PHOSPHATE, MONOBASIC POTASSIUM PHOSPHATE, DIBASIC 236; 224 MG/ML; MG/ML
15 INJECTION, SOLUTION INTRAVENOUS ONCE
Refills: 0 | Status: COMPLETED | OUTPATIENT
Start: 2019-11-04 | End: 2019-11-04

## 2019-11-04 RX ORDER — POTASSIUM CHLORIDE 20 MEQ
10 PACKET (EA) ORAL
Refills: 0 | Status: COMPLETED | OUTPATIENT
Start: 2019-11-04 | End: 2019-11-04

## 2019-11-04 RX ADMIN — Medication 100 MILLIEQUIVALENT(S): at 09:30

## 2019-11-04 RX ADMIN — Medication 3 MILLILITER(S): at 16:18

## 2019-11-04 RX ADMIN — SODIUM CHLORIDE 3 MILLILITER(S): 9 INJECTION INTRAMUSCULAR; INTRAVENOUS; SUBCUTANEOUS at 22:23

## 2019-11-04 RX ADMIN — Medication 3 MILLILITER(S): at 22:19

## 2019-11-04 RX ADMIN — PIPERACILLIN AND TAZOBACTAM 25 GRAM(S): 4; .5 INJECTION, POWDER, LYOPHILIZED, FOR SOLUTION INTRAVENOUS at 06:10

## 2019-11-04 RX ADMIN — PIPERACILLIN AND TAZOBACTAM 25 GRAM(S): 4; .5 INJECTION, POWDER, LYOPHILIZED, FOR SOLUTION INTRAVENOUS at 22:29

## 2019-11-04 RX ADMIN — PIPERACILLIN AND TAZOBACTAM 25 GRAM(S): 4; .5 INJECTION, POWDER, LYOPHILIZED, FOR SOLUTION INTRAVENOUS at 13:28

## 2019-11-04 RX ADMIN — POTASSIUM PHOSPHATE, MONOBASIC POTASSIUM PHOSPHATE, DIBASIC 62.5 MILLIMOLE(S): 236; 224 INJECTION, SOLUTION INTRAVENOUS at 09:27

## 2019-11-04 RX ADMIN — Medication 100 MILLIEQUIVALENT(S): at 10:21

## 2019-11-04 RX ADMIN — ENOXAPARIN SODIUM 30 MILLIGRAM(S): 100 INJECTION SUBCUTANEOUS at 13:28

## 2019-11-04 RX ADMIN — Medication 3 MILLILITER(S): at 10:52

## 2019-11-04 RX ADMIN — Medication 3 MILLILITER(S): at 03:22

## 2019-11-04 NOTE — PHYSICAL THERAPY INITIAL EVALUATION ADULT - ADDITIONAL COMMENTS
As per son and private aide is at bedside , patient is able to ambulate inside home with a rolling walker with CGA and for outdoors uses a wheelchair.

## 2019-11-04 NOTE — PROGRESS NOTE ADULT - SUBJECTIVE AND OBJECTIVE BOX
PROGRESS NOTE:     Patient is a 89y old  Female who presents with a chief complaint of SOB (03 Nov 2019 08:18)      SUBJECTIVE / OVERNIGHT EVENTS: Pt seen and examined. No acute overnight events. Pt reported no fever, chills, CP, SOB, abdominal pain, N/V, urinary or bowel issues, or new joint aches.     ADDITIONAL REVIEW OF SYSTEMS:    MEDICATIONS  (STANDING):  albuterol/ipratropium for Nebulization 3 milliLiter(s) Nebulizer every 6 hours  enoxaparin Injectable 30 milliGRAM(s) SubCutaneous daily  influenza   Vaccine 0.5 milliLiter(s) IntraMuscular once  piperacillin/tazobactam IVPB.. 3.375 Gram(s) IV Intermittent every 8 hours    MEDICATIONS  (PRN):  acetaminophen   Tablet .. 650 milliGRAM(s) Oral every 6 hours PRN Temp greater or equal to 38C (100.4F), Mild Pain (1 - 3), Moderate Pain (4 - 6), Severe Pain (7 - 10)      CAPILLARY BLOOD GLUCOSE        I&O's Summary    03 Nov 2019 07:01  -  04 Nov 2019 07:00  --------------------------------------------------------  IN: 800 mL / OUT: 1100 mL / NET: -300 mL        PHYSICAL EXAM:  Vital Signs Last 24 Hrs  T(C): 36.5 (04 Nov 2019 06:07), Max: 36.9 (03 Nov 2019 21:43)  T(F): 97.7 (04 Nov 2019 06:07), Max: 98.4 (03 Nov 2019 21:43)  HR: 98 (04 Nov 2019 06:07) (63 - 123)  BP: 106/66 (04 Nov 2019 06:07) (93/66 - 133/87)  BP(mean): --  RR: 16 (04 Nov 2019 06:07) (15 - 17)  SpO2: 96% (04 Nov 2019 06:07) (93% - 96%)    CONSTITUTIONAL: NAD, well-developed  RESPIRATORY: Normal respiratory effort; lungs are clear to auscultation bilaterally  CARDIOVASCULAR:  irregularly irregular, systolic murmur radiate to carotids; No lower extremity edema; Peripheral pulses are 2+ bilaterally  ABDOMEN: Nontender to palpation, normoactive bowel sounds, no rebound/guarding; No hepatosplenomegaly  MUSCLOSKELETAL: no clubbing or cyanosis of digits; no joint swelling. No erythema or lesions over left shoulder, mild pain to palpation, no noticeable deformity  PSYCH: A+O to person, place, and time; affect appropriate    LABS:                        11.6   14.27 )-----------( 228      ( 04 Nov 2019 05:55 )             39.0     11-04    146<H>  |  108<H>  |  20  ----------------------------<  105<H>  3.1<L>   |  26  |  0.59    Ca    9.2      04 Nov 2019 05:55  Phos  2.4     11-04  Mg     2.3     11-04    TPro  6.6  /  Alb  2.8<L>  /  TBili  1.0  /  DBili  x   /  AST  21  /  ALT  20  /  AlkPhos  80  11-04              Culture - Blood (collected 01 Nov 2019 16:55)  Source: Peripheral Site 1  Preliminary Report (03 Nov 2019 16:56):    NO ORGANISMS ISOLATED    NO ORGANISMS ISOLATED AT 48 HRS.    Culture - Blood (collected 01 Nov 2019 16:50)  Source: Peripheral Site 2  Preliminary Report (02 Nov 2019 09:26):    ***Blood Panel PCR results on this specimen are available    approximately 3 hours after the Gram stain result***    Gram stain, PCR, and/or culture results may not always    correspond due to difference in methodologies    ------------------------------------------------------------    This PCR assay was performed using [employer].  The    following targets are tested for:  Enterococcus, vancomycin    resistant enterococci, Listeria monocytogenes,  coagulase    negative staphylococci, S. aureus, methicillin resistant S.    aureus, Streptococcus agalactiae (Group B), S. pneumoniae,    S. pyogenes (Group A), Acinetobacter baumannii, Enterobacter    cloacae, E. coli, Klebsiella oxytoca, K. pneumoniae, Proteus    sp., Serratia marcescens, Haemophilus influenzae, Neisseria    meningitidis, Pseudomonas aeruginosa, Candida albicans, C.    glabrata, C. krusei, C. parapsilosis, C. tropicalis and the    KPC resistance gene.    **NOTE: Due to technical problems, Proteus sp. will NOT be    reported as part of the BCID paneluntil further notice.  Final Report (03 Nov 2019 12:42):    Single set isolate, possible contaminant.    Contact microbiology if susceptibility testing is clinically    indicated.  Organism: BLOOD CULTURE PCR  Staphylococcus sp.,coag neg (03 Nov 2019 12:42)  Organism: Staphylococcus sp.,coag neg (03 Nov 2019 12:42)  Organism: BLOOD CULTURE PCR  ***Blood Panel PCR results on this specimen are available  approximately 3 hours after the Gram stain result***  Gram stain, PCR, and/or culture results may not always  correspond due to difference in methodologies  ------------------------------------------------------------  This PCR assay was performed using [employer].  The  following targets are tested for:  Enterococcus, vancomycin  resistant enterococci, Listeria monocytogenes,  coagulase  negative staphylococci, S. aureus, methicillin resistant S.  aureus, Streptococcus agalactiae (Group B), S. pneumoniae,  S. pyogenes (Group A), Acinetobacter baumannii, Enterobacter  cloacae, E. coli, Klebsiella oxytoca, K. pneumoniae, Proteus  sp., Serratia marcescens, Haemophilus influenzae, Neisseria  meningitidis, Pseudomonas aeruginosa, Candida albicans, C.  glabrata, C. krusei, C. parapsilosis, C. tropicalis and the  KPC resistance gene.  **NOTE: Due to technical problems, Proteus sp. will NOT be  reported as part of the BCID panel until further notice. (03 Nov 2019 12:42)    Culture - Urine (collected 01 Nov 2019 13:56)  Source: URINE MIDSTREAM  Preliminary Report (02 Nov 2019 12:17):    9%EC    EC^Escherichia coli    COLONY COUNT: > = 100,000 CFU/ML  Final Report (03 Nov 2019 12:15):    COLONY COUNT: > = 100,000 CFU/ML  Organism: Escherichia coli (03 Nov 2019 12:15)  Organism: Escherichia coli (03 Nov 2019 12:15)        RADIOLOGY & ADDITIONAL TESTS:  Results Reviewed:   Imaging Personally Reviewed:  Electrocardiogram Personally Reviewed:    COORDINATION OF CARE:  Care Discussed with Consultants/Other Providers [Y/N]:  Prior or Outpatient Records Reviewed [Y/N]:

## 2019-11-04 NOTE — PROGRESS NOTE ADULT - PROBLEM SELECTOR PLAN 6
2/2 PNA  - weaned off oxygen  - if any concern for fluid overload, would trial low dose of lasix  - elevated BNP, likely has diastolic dysfunction

## 2019-11-04 NOTE — PHYSICAL THERAPY INITIAL EVALUATION ADULT - PERTINENT HX OF CURRENT PROBLEM, REHAB EVAL
This is a 89y F with PMH of dementia (AAOx2 at baseline) presenting with weakness and SOB x 1 day, found to have positive UA, admitted for sepsis likely 2/2 UTI vs asp PNA, also found to have Afib with RVR.

## 2019-11-04 NOTE — PROGRESS NOTE ADULT - PROBLEM SELECTOR PLAN 7
DVT ppx: lovenox daily  Diet: NPO w/ transition to pleasure feeds  Dispo: likely home  Transitions of Care Status:  1.  Name of PCP:   2.  PCP Contacted on Admission: [ ] Y    [ x] N    3.  PCP contacted at Discharge: [ ] Y    [ ] N    [ ] N/A  4.  Post-Discharge Appointment Date and Location:  5.  Summary of Handoff given to PCP:

## 2019-11-04 NOTE — PROGRESS NOTE ADULT - PROBLEM SELECTOR PLAN 1
febrile, tachy, elevated WBC. UA positive. CXR with possible L effusion. RVP negative.   - sepsis likely 2/2 aspiration PNA given the h/o of coughing with PO intake (and gurgling) vs UTI (less likely, no sxs)  - c/w zosyn (11/1- )

## 2019-11-04 NOTE — PROGRESS NOTE ADULT - ASSESSMENT
89 yo F with PMH of dementia (AAOx2 at baseline) presenting with weakness and SOB x 1 day, found to have positive UA, admitted for sepsis likely 2/2 UTI vs asp PNA, also found to have Afib with RVR. Speech and swallow evaluation deemed aspiration risk, son would like pleasure feeds but will keep NPO for now for couple days until aspiration PNA resolves.

## 2019-11-05 LAB
ALBUMIN SERPL ELPH-MCNC: 2.7 G/DL — LOW (ref 3.3–5)
ALP SERPL-CCNC: 82 U/L — SIGNIFICANT CHANGE UP (ref 40–120)
ALT FLD-CCNC: 19 U/L — SIGNIFICANT CHANGE UP (ref 4–33)
ANION GAP SERPL CALC-SCNC: 15 MMO/L — HIGH (ref 7–14)
AST SERPL-CCNC: 18 U/L — SIGNIFICANT CHANGE UP (ref 4–32)
BASOPHILS # BLD AUTO: 0.02 K/UL — SIGNIFICANT CHANGE UP (ref 0–0.2)
BASOPHILS NFR BLD AUTO: 0.1 % — SIGNIFICANT CHANGE UP (ref 0–2)
BILIRUB SERPL-MCNC: 0.6 MG/DL — SIGNIFICANT CHANGE UP (ref 0.2–1.2)
BUN SERPL-MCNC: 17 MG/DL — SIGNIFICANT CHANGE UP (ref 7–23)
CALCIUM SERPL-MCNC: 9.1 MG/DL — SIGNIFICANT CHANGE UP (ref 8.4–10.5)
CHLORIDE SERPL-SCNC: 111 MMOL/L — HIGH (ref 98–107)
CO2 SERPL-SCNC: 23 MMOL/L — SIGNIFICANT CHANGE UP (ref 22–31)
CREAT SERPL-MCNC: 0.53 MG/DL — SIGNIFICANT CHANGE UP (ref 0.5–1.3)
EOSINOPHIL # BLD AUTO: 0.02 K/UL — SIGNIFICANT CHANGE UP (ref 0–0.5)
EOSINOPHIL NFR BLD AUTO: 0.1 % — SIGNIFICANT CHANGE UP (ref 0–6)
GLUCOSE SERPL-MCNC: 96 MG/DL — SIGNIFICANT CHANGE UP (ref 70–99)
HCT VFR BLD CALC: 37.2 % — SIGNIFICANT CHANGE UP (ref 34.5–45)
HGB BLD-MCNC: 10.8 G/DL — LOW (ref 11.5–15.5)
IMM GRANULOCYTES NFR BLD AUTO: 0.7 % — SIGNIFICANT CHANGE UP (ref 0–1.5)
LYMPHOCYTES # BLD AUTO: 0.84 K/UL — LOW (ref 1–3.3)
LYMPHOCYTES # BLD AUTO: 6.3 % — LOW (ref 13–44)
MAGNESIUM SERPL-MCNC: 2.3 MG/DL — SIGNIFICANT CHANGE UP (ref 1.6–2.6)
MCHC RBC-ENTMCNC: 29 % — LOW (ref 32–36)
MCHC RBC-ENTMCNC: 29 PG — SIGNIFICANT CHANGE UP (ref 27–34)
MCV RBC AUTO: 100 FL — SIGNIFICANT CHANGE UP (ref 80–100)
MONOCYTES # BLD AUTO: 0.83 K/UL — SIGNIFICANT CHANGE UP (ref 0–0.9)
MONOCYTES NFR BLD AUTO: 6.2 % — SIGNIFICANT CHANGE UP (ref 2–14)
NEUTROPHILS # BLD AUTO: 11.57 K/UL — HIGH (ref 1.8–7.4)
NEUTROPHILS NFR BLD AUTO: 86.6 % — HIGH (ref 43–77)
NRBC # FLD: 0 K/UL — SIGNIFICANT CHANGE UP (ref 0–0)
PHOSPHATE SERPL-MCNC: 2.4 MG/DL — LOW (ref 2.5–4.5)
PLATELET # BLD AUTO: 265 K/UL — SIGNIFICANT CHANGE UP (ref 150–400)
PMV BLD: 10.9 FL — SIGNIFICANT CHANGE UP (ref 7–13)
POTASSIUM SERPL-MCNC: 3.4 MMOL/L — LOW (ref 3.5–5.3)
POTASSIUM SERPL-SCNC: 3.4 MMOL/L — LOW (ref 3.5–5.3)
PROT SERPL-MCNC: 6.5 G/DL — SIGNIFICANT CHANGE UP (ref 6–8.3)
RBC # BLD: 3.72 M/UL — LOW (ref 3.8–5.2)
RBC # FLD: 14.6 % — HIGH (ref 10.3–14.5)
SODIUM SERPL-SCNC: 149 MMOL/L — HIGH (ref 135–145)
WBC # BLD: 13.38 K/UL — HIGH (ref 3.8–10.5)
WBC # FLD AUTO: 13.38 K/UL — HIGH (ref 3.8–10.5)

## 2019-11-05 PROCEDURE — 99233 SBSQ HOSP IP/OBS HIGH 50: CPT | Mod: GC

## 2019-11-05 RX ADMIN — Medication 1 ENEMA: at 12:28

## 2019-11-05 RX ADMIN — SENNA PLUS 2 TABLET(S): 8.6 TABLET ORAL at 21:31

## 2019-11-05 RX ADMIN — Medication 3 MILLILITER(S): at 15:55

## 2019-11-05 RX ADMIN — SODIUM CHLORIDE 3 MILLILITER(S): 9 INJECTION INTRAMUSCULAR; INTRAVENOUS; SUBCUTANEOUS at 08:09

## 2019-11-05 RX ADMIN — Medication 100 MILLIGRAM(S): at 18:31

## 2019-11-05 RX ADMIN — Medication 3 MILLILITER(S): at 03:51

## 2019-11-05 RX ADMIN — PIPERACILLIN AND TAZOBACTAM 25 GRAM(S): 4; .5 INJECTION, POWDER, LYOPHILIZED, FOR SOLUTION INTRAVENOUS at 06:40

## 2019-11-05 RX ADMIN — SODIUM CHLORIDE 3 MILLILITER(S): 9 INJECTION INTRAMUSCULAR; INTRAVENOUS; SUBCUTANEOUS at 22:23

## 2019-11-05 RX ADMIN — PIPERACILLIN AND TAZOBACTAM 25 GRAM(S): 4; .5 INJECTION, POWDER, LYOPHILIZED, FOR SOLUTION INTRAVENOUS at 13:32

## 2019-11-05 RX ADMIN — Medication 3 MILLILITER(S): at 22:24

## 2019-11-05 RX ADMIN — Medication 100 MILLIGRAM(S): at 12:27

## 2019-11-05 RX ADMIN — PIPERACILLIN AND TAZOBACTAM 25 GRAM(S): 4; .5 INJECTION, POWDER, LYOPHILIZED, FOR SOLUTION INTRAVENOUS at 21:31

## 2019-11-05 RX ADMIN — POLYETHYLENE GLYCOL 3350 17 GRAM(S): 17 POWDER, FOR SOLUTION ORAL at 12:27

## 2019-11-05 RX ADMIN — ENOXAPARIN SODIUM 30 MILLIGRAM(S): 100 INJECTION SUBCUTANEOUS at 12:27

## 2019-11-05 RX ADMIN — SODIUM CHLORIDE 3 MILLILITER(S): 9 INJECTION INTRAMUSCULAR; INTRAVENOUS; SUBCUTANEOUS at 16:01

## 2019-11-05 RX ADMIN — Medication 3 MILLILITER(S): at 08:29

## 2019-11-05 NOTE — PROGRESS NOTE ADULT - SUBJECTIVE AND OBJECTIVE BOX
PROGRESS NOTE:     Patient is a 89y old  Female who presents with a chief complaint of SOB (04 Nov 2019 08:35)      SUBJECTIVE / OVERNIGHT EVENTS: Pt seen and examined. No acute overnight events. Pt reported no fever, chills, CP, SOB, abdominal pain, N/V, urinary or bowel issues, or new joint aches.     ADDITIONAL REVIEW OF SYSTEMS:    MEDICATIONS  (STANDING):  albuterol/ipratropium for Nebulization 3 milliLiter(s) Nebulizer every 6 hours  enoxaparin Injectable 30 milliGRAM(s) SubCutaneous daily  influenza   Vaccine 0.5 milliLiter(s) IntraMuscular once  piperacillin/tazobactam IVPB.. 3.375 Gram(s) IV Intermittent every 8 hours  polyethylene glycol 3350 17 Gram(s) Oral daily  senna 2 Tablet(s) Oral at bedtime  sodium chloride 3%  Inhalation 3 milliLiter(s) Inhalation three times a day    MEDICATIONS  (PRN):  acetaminophen   Tablet .. 650 milliGRAM(s) Oral every 6 hours PRN Temp greater or equal to 38C (100.4F), Mild Pain (1 - 3), Moderate Pain (4 - 6), Severe Pain (7 - 10)      CAPILLARY BLOOD GLUCOSE        I&O's Summary    04 Nov 2019 07:01  -  05 Nov 2019 07:00  --------------------------------------------------------  IN: 300 mL / OUT: 900 mL / NET: -600 mL        PHYSICAL EXAM:  Vital Signs Last 24 Hrs  T(C): 36.4 (05 Nov 2019 06:34), Max: 36.6 (04 Nov 2019 21:09)  T(F): 97.6 (05 Nov 2019 06:34), Max: 97.9 (04 Nov 2019 21:09)  HR: 100 (05 Nov 2019 06:34) (76 - 100)  BP: 132/81 (05 Nov 2019 06:34) (114/73 - 132/81)  BP(mean): --  RR: 17 (05 Nov 2019 06:34) (17 - 17)  SpO2: 97% (05 Nov 2019 06:34) (95% - 98%)      CONSTITUTIONAL: NAD, well-developed  RESPIRATORY: Normal respiratory effort; lungs are clear to auscultation bilaterally  CARDIOVASCULAR:  irregularly irregular, systolic murmur radiate to carotids; No lower extremity edema; Peripheral pulses are 2+ bilaterally  ABDOMEN: Nontender to palpation, normoactive bowel sounds, no rebound/guarding; No hepatosplenomegaly  MUSCLOSKELETAL: no clubbing or cyanosis of digits; no joint swelling. No erythema or lesions over left shoulder, mild pain to palpation, no noticeable deformity  PSYCH: A+O to person, place, and time; affect appropriate    LABS:                        11.6   14.27 )-----------( 228      ( 04 Nov 2019 05:55 )             39.0     11-04    146<H>  |  108<H>  |  20  ----------------------------<  105<H>  3.1<L>   |  26  |  0.59    Ca    9.2      04 Nov 2019 05:55  Phos  2.4     11-04  Mg     2.3     11-04    TPro  6.6  /  Alb  2.8<L>  /  TBili  1.0  /  DBili  x   /  AST  21  /  ALT  20  /  AlkPhos  80  11-04        Culture - Blood (collected 03 Nov 2019 10:48)  Source: BLOOD VENOUS  Preliminary Report (04 Nov 2019 10:49):    NO ORGANISMS ISOLATED    NO ORGANISMS ISOLATED AT 24 HOURS    Culture - Blood (collected 03 Nov 2019 10:48)  Source: BLOOD PERIPHERAL  Preliminary Report (04 Nov 2019 10:49):    NO ORGANISMS ISOLATED    NO ORGANISMS ISOLATED AT 24 HOURS        RADIOLOGY & ADDITIONAL TESTS:  Results Reviewed:   Imaging Personally Reviewed:  Electrocardiogram Personally Reviewed:    COORDINATION OF CARE:  Care Discussed with Consultants/Other Providers [Y/N]:  Prior or Outpatient Records Reviewed [Y/N]: PROGRESS NOTE:     Patient is a 89y old  Female who presents with a chief complaint of SOB (04 Nov 2019 08:35)      SUBJECTIVE / OVERNIGHT EVENTS: Pt seen and examined. No acute overnight events. Pt reported no fever, chills, CP, SOB, abdominal pain, N/V, urinary or bowel issues, or new joint aches.     ADDITIONAL REVIEW OF SYSTEMS:    MEDICATIONS  (STANDING):  albuterol/ipratropium for Nebulization 3 milliLiter(s) Nebulizer every 6 hours  enoxaparin Injectable 30 milliGRAM(s) SubCutaneous daily  influenza   Vaccine 0.5 milliLiter(s) IntraMuscular once  piperacillin/tazobactam IVPB.. 3.375 Gram(s) IV Intermittent every 8 hours  polyethylene glycol 3350 17 Gram(s) Oral daily  senna 2 Tablet(s) Oral at bedtime  sodium chloride 3%  Inhalation 3 milliLiter(s) Inhalation three times a day    MEDICATIONS  (PRN):  acetaminophen   Tablet .. 650 milliGRAM(s) Oral every 6 hours PRN Temp greater or equal to 38C (100.4F), Mild Pain (1 - 3), Moderate Pain (4 - 6), Severe Pain (7 - 10)      CAPILLARY BLOOD GLUCOSE        I&O's Summary    04 Nov 2019 07:01  -  05 Nov 2019 07:00  --------------------------------------------------------  IN: 300 mL / OUT: 900 mL / NET: -600 mL        PHYSICAL EXAM:  Vital Signs Last 24 Hrs  T(C): 36.4 (05 Nov 2019 06:34), Max: 36.6 (04 Nov 2019 21:09)  T(F): 97.6 (05 Nov 2019 06:34), Max: 97.9 (04 Nov 2019 21:09)  HR: 100 (05 Nov 2019 06:34) (76 - 100)  BP: 132/81 (05 Nov 2019 06:34) (114/73 - 132/81)  BP(mean): --  RR: 17 (05 Nov 2019 06:34) (17 - 17)  SpO2: 97% (05 Nov 2019 06:34) (95% - 98%)      CONSTITUTIONAL: NAD, well-developed  RESPIRATORY: Normal respiratory effort; lungs are clear to auscultation bilaterally  CARDIOVASCULAR:  irregularly irregular, systolic murmur radiate to carotids; No lower extremity edema; Peripheral pulses are 2+ bilaterally  ABDOMEN: Nontender to palpation, normoactive bowel sounds, no rebound/guarding; No hepatosplenomegaly  MUSCLOSKELETAL: no clubbing or cyanosis of digits; no joint swelling.  PSYCH: A+O to person, place, and time; affect appropriate    LABS:                        11.6   14.27 )-----------( 228      ( 04 Nov 2019 05:55 )             39.0     11-04    146<H>  |  108<H>  |  20  ----------------------------<  105<H>  3.1<L>   |  26  |  0.59    Ca    9.2      04 Nov 2019 05:55  Phos  2.4     11-04  Mg     2.3     11-04    TPro  6.6  /  Alb  2.8<L>  /  TBili  1.0  /  DBili  x   /  AST  21  /  ALT  20  /  AlkPhos  80  11-04        Culture - Blood (collected 03 Nov 2019 10:48)  Source: BLOOD VENOUS  Preliminary Report (04 Nov 2019 10:49):    NO ORGANISMS ISOLATED    NO ORGANISMS ISOLATED AT 24 HOURS    Culture - Blood (collected 03 Nov 2019 10:48)  Source: BLOOD PERIPHERAL  Preliminary Report (04 Nov 2019 10:49):    NO ORGANISMS ISOLATED    NO ORGANISMS ISOLATED AT 24 HOURS        RADIOLOGY & ADDITIONAL TESTS:  Results Reviewed:   Imaging Personally Reviewed:  Electrocardiogram Personally Reviewed:    COORDINATION OF CARE:  Care Discussed with Consultants/Other Providers [Y/N]:  Prior or Outpatient Records Reviewed [Y/N]:

## 2019-11-05 NOTE — PROGRESS NOTE ADULT - PROBLEM SELECTOR PLAN 1
febrile, tachy, elevated WBC. UA positive. CXR with possible L effusion. RVP negative.   - sepsis likely 2/2 aspiration PNA given the h/o of coughing with PO intake (and gurgling) vs UTI (less likely, no sxs)  - c/w zosyn (11/1- 11/6)

## 2019-11-06 ENCOUNTER — TRANSCRIPTION ENCOUNTER (OUTPATIENT)
Age: 84
End: 2019-11-06

## 2019-11-06 LAB
ALBUMIN SERPL ELPH-MCNC: 2.5 G/DL — LOW (ref 3.3–5)
ALP SERPL-CCNC: 82 U/L — SIGNIFICANT CHANGE UP (ref 40–120)
ALT FLD-CCNC: 18 U/L — SIGNIFICANT CHANGE UP (ref 4–33)
ANION GAP SERPL CALC-SCNC: 13 MMO/L — SIGNIFICANT CHANGE UP (ref 7–14)
AST SERPL-CCNC: 16 U/L — SIGNIFICANT CHANGE UP (ref 4–32)
BACTERIA BLD CULT: SIGNIFICANT CHANGE UP
BASOPHILS # BLD AUTO: 0.03 K/UL — SIGNIFICANT CHANGE UP (ref 0–0.2)
BASOPHILS NFR BLD AUTO: 0.2 % — SIGNIFICANT CHANGE UP (ref 0–2)
BILIRUB SERPL-MCNC: 0.4 MG/DL — SIGNIFICANT CHANGE UP (ref 0.2–1.2)
BUN SERPL-MCNC: 19 MG/DL — SIGNIFICANT CHANGE UP (ref 7–23)
CALCIUM SERPL-MCNC: 9 MG/DL — SIGNIFICANT CHANGE UP (ref 8.4–10.5)
CHLORIDE SERPL-SCNC: 112 MMOL/L — HIGH (ref 98–107)
CO2 SERPL-SCNC: 22 MMOL/L — SIGNIFICANT CHANGE UP (ref 22–31)
CREAT SERPL-MCNC: 0.56 MG/DL — SIGNIFICANT CHANGE UP (ref 0.5–1.3)
EOSINOPHIL # BLD AUTO: 0.01 K/UL — SIGNIFICANT CHANGE UP (ref 0–0.5)
EOSINOPHIL NFR BLD AUTO: 0.1 % — SIGNIFICANT CHANGE UP (ref 0–6)
GLUCOSE SERPL-MCNC: 158 MG/DL — HIGH (ref 70–99)
HCT VFR BLD CALC: 37 % — SIGNIFICANT CHANGE UP (ref 34.5–45)
HGB BLD-MCNC: 11.1 G/DL — LOW (ref 11.5–15.5)
IMM GRANULOCYTES NFR BLD AUTO: 1.9 % — HIGH (ref 0–1.5)
LYMPHOCYTES # BLD AUTO: 1 K/UL — SIGNIFICANT CHANGE UP (ref 1–3.3)
LYMPHOCYTES # BLD AUTO: 7 % — LOW (ref 13–44)
MAGNESIUM SERPL-MCNC: 2.2 MG/DL — SIGNIFICANT CHANGE UP (ref 1.6–2.6)
MCHC RBC-ENTMCNC: 29.5 PG — SIGNIFICANT CHANGE UP (ref 27–34)
MCHC RBC-ENTMCNC: 30 % — LOW (ref 32–36)
MCV RBC AUTO: 98.4 FL — SIGNIFICANT CHANGE UP (ref 80–100)
MONOCYTES # BLD AUTO: 0.89 K/UL — SIGNIFICANT CHANGE UP (ref 0–0.9)
MONOCYTES NFR BLD AUTO: 6.2 % — SIGNIFICANT CHANGE UP (ref 2–14)
NEUTROPHILS # BLD AUTO: 12.12 K/UL — HIGH (ref 1.8–7.4)
NEUTROPHILS NFR BLD AUTO: 84.6 % — HIGH (ref 43–77)
NRBC # FLD: 0 K/UL — SIGNIFICANT CHANGE UP (ref 0–0)
PHOSPHATE SERPL-MCNC: 2.8 MG/DL — SIGNIFICANT CHANGE UP (ref 2.5–4.5)
PLATELET # BLD AUTO: 248 K/UL — SIGNIFICANT CHANGE UP (ref 150–400)
PMV BLD: 10.6 FL — SIGNIFICANT CHANGE UP (ref 7–13)
POTASSIUM SERPL-MCNC: 3.4 MMOL/L — LOW (ref 3.5–5.3)
POTASSIUM SERPL-SCNC: 3.4 MMOL/L — LOW (ref 3.5–5.3)
PROT SERPL-MCNC: 6.4 G/DL — SIGNIFICANT CHANGE UP (ref 6–8.3)
RBC # BLD: 3.76 M/UL — LOW (ref 3.8–5.2)
RBC # FLD: 14.7 % — HIGH (ref 10.3–14.5)
SODIUM SERPL-SCNC: 147 MMOL/L — HIGH (ref 135–145)
WBC # BLD: 14.32 K/UL — HIGH (ref 3.8–10.5)
WBC # FLD AUTO: 14.32 K/UL — HIGH (ref 3.8–10.5)

## 2019-11-06 PROCEDURE — 99233 SBSQ HOSP IP/OBS HIGH 50: CPT | Mod: GC

## 2019-11-06 RX ORDER — POLYETHYLENE GLYCOL 3350 17 G/17G
17 POWDER, FOR SOLUTION ORAL
Qty: 0 | Refills: 0 | DISCHARGE
Start: 2019-11-06

## 2019-11-06 RX ORDER — SENNA PLUS 8.6 MG/1
2 TABLET ORAL
Qty: 0 | Refills: 0 | DISCHARGE
Start: 2019-11-06

## 2019-11-06 RX ORDER — FUROSEMIDE 40 MG
20 TABLET ORAL ONCE
Refills: 0 | Status: COMPLETED | OUTPATIENT
Start: 2019-11-06 | End: 2019-11-06

## 2019-11-06 RX ADMIN — PIPERACILLIN AND TAZOBACTAM 25 GRAM(S): 4; .5 INJECTION, POWDER, LYOPHILIZED, FOR SOLUTION INTRAVENOUS at 06:44

## 2019-11-06 RX ADMIN — SODIUM CHLORIDE 3 MILLILITER(S): 9 INJECTION INTRAMUSCULAR; INTRAVENOUS; SUBCUTANEOUS at 09:17

## 2019-11-06 RX ADMIN — SENNA PLUS 2 TABLET(S): 8.6 TABLET ORAL at 23:00

## 2019-11-06 RX ADMIN — PIPERACILLIN AND TAZOBACTAM 25 GRAM(S): 4; .5 INJECTION, POWDER, LYOPHILIZED, FOR SOLUTION INTRAVENOUS at 23:00

## 2019-11-06 RX ADMIN — SODIUM CHLORIDE 3 MILLILITER(S): 9 INJECTION INTRAMUSCULAR; INTRAVENOUS; SUBCUTANEOUS at 21:15

## 2019-11-06 RX ADMIN — Medication 100 MILLIGRAM(S): at 00:21

## 2019-11-06 RX ADMIN — Medication 3 MILLILITER(S): at 21:02

## 2019-11-06 RX ADMIN — POLYETHYLENE GLYCOL 3350 17 GRAM(S): 17 POWDER, FOR SOLUTION ORAL at 12:07

## 2019-11-06 RX ADMIN — Medication 100 MILLIGRAM(S): at 06:44

## 2019-11-06 RX ADMIN — Medication 20 MILLIGRAM(S): at 14:50

## 2019-11-06 RX ADMIN — Medication 3 MILLILITER(S): at 09:19

## 2019-11-06 RX ADMIN — PIPERACILLIN AND TAZOBACTAM 25 GRAM(S): 4; .5 INJECTION, POWDER, LYOPHILIZED, FOR SOLUTION INTRAVENOUS at 14:50

## 2019-11-06 RX ADMIN — Medication 100 MILLIGRAM(S): at 12:06

## 2019-11-06 RX ADMIN — ENOXAPARIN SODIUM 30 MILLIGRAM(S): 100 INJECTION SUBCUTANEOUS at 12:07

## 2019-11-06 RX ADMIN — Medication 3 MILLILITER(S): at 04:38

## 2019-11-06 NOTE — CHART NOTE - NSCHARTNOTESELECT_GEN_ALL_CORE
[phone].  Message printed for Dr. Claire's review.     Event Note

## 2019-11-06 NOTE — PROGRESS NOTE ADULT - SUBJECTIVE AND OBJECTIVE BOX
PROGRESS NOTE:     Patient is a 89y old  Female who presents with a chief complaint of SOB (05 Nov 2019 07:15)      SUBJECTIVE / OVERNIGHT EVENTS: Pt seen and examined. No acute overnight events. Pt reported no fever, chills, CP, SOB, abdominal pain, N/V, urinary or bowel issues, or new joint aches.     ADDITIONAL REVIEW OF SYSTEMS:    MEDICATIONS  (STANDING):  albuterol/ipratropium for Nebulization 3 milliLiter(s) Nebulizer every 6 hours  enoxaparin Injectable 30 milliGRAM(s) SubCutaneous daily  guaiFENesin    Syrup 100 milliGRAM(s) Oral every 6 hours  influenza   Vaccine 0.5 milliLiter(s) IntraMuscular once  piperacillin/tazobactam IVPB.. 3.375 Gram(s) IV Intermittent every 8 hours  polyethylene glycol 3350 17 Gram(s) Oral daily  senna 2 Tablet(s) Oral at bedtime  sodium chloride 3%  Inhalation 3 milliLiter(s) Inhalation three times a day  sorbitol 70%/mineral oil/magnesium hydroxide/glycerin Enema 120 milliLiter(s) Rectal once    MEDICATIONS  (PRN):  acetaminophen   Tablet .. 650 milliGRAM(s) Oral every 6 hours PRN Temp greater or equal to 38C (100.4F), Mild Pain (1 - 3), Moderate Pain (4 - 6), Severe Pain (7 - 10)      CAPILLARY BLOOD GLUCOSE        I&O's Summary    05 Nov 2019 07:01  -  06 Nov 2019 07:00  --------------------------------------------------------  IN: 480 mL / OUT: 300 mL / NET: 180 mL        PHYSICAL EXAM:  Vital Signs Last 24 Hrs  T(C): 36.4 (06 Nov 2019 06:49), Max: 36.8 (05 Nov 2019 14:40)  T(F): 97.6 (06 Nov 2019 06:49), Max: 98.3 (05 Nov 2019 14:40)  HR: 90 (06 Nov 2019 06:49) (68 - 110)  BP: 107/72 (06 Nov 2019 06:49) (101/79 - 139/81)  BP(mean): --  RR: 18 (06 Nov 2019 06:49) (17 - 18)  SpO2: 95% (06 Nov 2019 06:49) (95% - 97%)    CONSTITUTIONAL: NAD, well-developed  RESPIRATORY: Normal respiratory effort; lungs are clear to auscultation bilaterally  CARDIOVASCULAR: Regular rate and rhythm, normal S1 and S2, no murmur/rub/gallop; No lower extremity edema; Peripheral pulses are 2+ bilaterally  ABDOMEN: Nontender to palpation, normoactive bowel sounds, no rebound/guarding; No hepatosplenomegaly  MUSCLOSKELETAL: no clubbing or cyanosis of digits; no joint swelling or tenderness to palpation  PSYCH: A+O to person, place, and time; affect appropriate    LABS:                        10.8   13.38 )-----------( 265      ( 05 Nov 2019 06:30 )             37.2     11-05    149<H>  |  111<H>  |  17  ----------------------------<  96  3.4<L>   |  23  |  0.53    Ca    9.1      05 Nov 2019 06:30  Phos  2.4     11-05  Mg     2.3     11-05    TPro  6.5  /  Alb  2.7<L>  /  TBili  0.6  /  DBili  x   /  AST  18  /  ALT  19  /  AlkPhos  82  11-05        Culture - Blood (collected 03 Nov 2019 10:48)  Source: BLOOD VENOUS  Preliminary Report (05 Nov 2019 10:49):    NO ORGANISMS ISOLATED    NO ORGANISMS ISOLATED AT 48 HRS.    Culture - Blood (collected 03 Nov 2019 10:48)  Source: BLOOD PERIPHERAL  Preliminary Report (05 Nov 2019 10:49):    NO ORGANISMS ISOLATED    NO ORGANISMS ISOLATED AT 48 HRS.        RADIOLOGY & ADDITIONAL TESTS:  Results Reviewed:   Imaging Personally Reviewed:  Electrocardiogram Personally Reviewed:    COORDINATION OF CARE:  Care Discussed with Consultants/Other Providers [Y/N]:  Prior or Outpatient Records Reviewed [Y/N]:

## 2019-11-06 NOTE — PROGRESS NOTE ADULT - PROBLEM SELECTOR PLAN 7
DVT ppx: lovenox daily  Diet: pleasure feeds  Dispo: likely home  Transitions of Care Status:  1.  Name of PCP:   2.  PCP Contacted on Admission: [ ] Y    [ x] N    3.  PCP contacted at Discharge: [ ] Y    [ ] N    [ ] N/A  4.  Post-Discharge Appointment Date and Location:  5.  Summary of Handoff given to PCP:

## 2019-11-06 NOTE — DISCHARGE NOTE PROVIDER - HOSPITAL COURSE
Pt admitted for cough, found to have UTI and consolidations on CXR; course c/b AFIB, which resolved.  Pt started on Zosyn to cover for possible aspiration; pt failed speech/swallow eval, but son wanted pleasure feeds - risks explained in detail in layman's terms to him.  Pt received Zosyn for 5 days, remained tachy; CTA chest obtained to r/o PE and worsening pneumonia which showed ____. Pt admitted for cough, found to have UTI and consolidations on CXR; course c/b AFIB, which resolved.  Pt started on Zosyn to cover for possible aspiration; pt failed speech/swallow eval, but son wanted pleasure feeds - risks explained in detail in layman's terms to him.  Pt received Zosyn for 5 days. Patient became hypoxic requiring supplemental oxygen after completion of antibiotics. CTA chest obtained to r/o PE and worsening pneumonia which showed a large right pleural effusion with total right lower lobe collapse and a left lower lobe effusion. An incidental liver mass was discovered on this exam, and a follow up CT of the abdomen and pelvis showed _______ . Pt admitted for cough, found to have UTI and consolidations on CXR; course c/b AFIB, which resolved.  Pt started on Zosyn to cover for possible aspiration; pt failed speech/swallow eval, but son wanted pleasure feeds - risks explained in detail in layman's terms to him.  Pt received Zosyn for 5 days. Patient became hypoxic requiring supplemental oxygen after completion of antibiotics. CTA chest obtained to r/o PE and worsening pneumonia which showed a large right pleural effusion with total right lower lobe collapse and a left lower lobe effusion. An incidental liver mass was discovered on this exam, and a follow up CT of the abdomen and pelvis showed enlarged gallbladder with evidence of perforation. After discussion with family, the decision was made to _______ . Pt admitted for cough, found to have UTI and consolidations on CXR; course c/b AFIB, which resolved.  Pt started on Zosyn to cover for possible aspiration; pt failed speech/swallow eval, but son wanted pleasure feeds - risks explained in detail in layman's terms to him.  Pt received Zosyn for 5 days. Patient became hypoxic requiring supplemental oxygen after completion of antibiotics. CTA chest obtained to r/o PE and worsening pneumonia which showed a large right pleural effusion with total right lower lobe collapse and a left lower lobe effusion. An incidental liver mass was discovered on this exam, and a follow up CT of the abdomen and pelvis showed enlarged gallbladder with evidence of perforation. Patient was placed on zosyn until 11/14. IR was initially consulted which believed she was a candidate for perc vidhya, however pt's HCP, the son, did not want anything invasive. The risks and benefits of procedure were explained, including worsening clinical status, sepsis, and death, to the son. The son understood and still wished for non-invasive options. IR did not recommend perc vidhya as her leukocytosis resolved. Patient has been HDS, without fevers, or worsening clinical status. Extensive discussion with family including possibility of patient going into sepsis if abx are stopped were explained. Pt's son, Jarret Schrader, opted for home hospice. Patient was accepted to home hospice. Patient is HDS for discharge to home with home hospice with oral antibiotics. Pt admitted for cough, found to have UTI and consolidations on CXR; course c/b AFIB, which resolved.  Pt started on Zosyn to cover for possible aspiration; pt failed speech/swallow eval, but son wanted pleasure feeds - risks explained in detail in layman's terms to him.  Pt received Zosyn for 5 days. Patient became hypoxic requiring supplemental oxygen after completion of antibiotics. CTA chest obtained to r/o PE and worsening pneumonia which showed a large right pleural effusion with total right lower lobe collapse and a left lower lobe effusion. An incidental liver mass was discovered on this exam, and a follow up CT of the abdomen and pelvis showed enlarged gallbladder with evidence of perforation. Patient was placed on zosyn until 11/14. IR was initially consulted which believed she was a candidate for perc vidhya, however pt's HCP, the son, did not want anything invasive. The risks and benefits not undergoing perc vidhya were explained, including worsening clinical status, sepsis, and death, to the son. The son understood and still wished for non-invasive options. IR did not recommend perc vidhya as her leukocytosis resolved. Patient has been HDS, without fevers, or worsening clinical status. Extensive discussion with family including possibility of patient going into sepsis if abx are stopped were explained. Pt's son, Jarret Schrader, opted for home hospice. Patient was accepted to home hospice. Patient is HDS for discharge to home with home hospice with oral antibiotics. Pt admitted for cough, found to have UTI and consolidations on CXR; course c/b AFIB, which resolved.  Pt started on Zosyn to cover for possible aspiration; pt failed speech/swallow eval, but son wanted pleasure feeds - risks explained in detail in layman's terms to him.  Pt received Zosyn for 5 days. Patient became hypoxic requiring supplemental oxygen after completion of antibiotics. CTA chest obtained to r/o PE and worsening pneumonia which showed a large right pleural effusion with total right lower lobe collapse and a left lower lobe effusion. An incidental liver mass was discovered on this exam, and a follow up CT of the abdomen and pelvis showed enlarged gallbladder with evidence of perforation. Patient was placed on zosyn until 11/14. IR was initially consulted which believed she was a candidate for perc vidhya, however pt's HCP, the son, did not want anything invasive. The risks and benefits not undergoing perc vidhya were explained, including worsening clinical status, sepsis, and death, to the son. The son understood and still wished for non-invasive options. IR did not recommend perc vidhya as her leukocytosis resolved. Patient has been HDS, without fevers, or worsening clinical status. Extensive discussion with family including possibility of patient going into sepsis if abx are stopped were explained. Pt's son understands and understands the risks. Patient was accepted to home hospice, however pts son opts to take patient home as she has 2 private health aides who takes excellent care of pt. Patient is HDS for discharge to home. 89 yo F with PMH of dementia (AAOx2 at baseline) presenting with weakness and SOB x 1 day. Patient has 24/7 HHA who noticed that patient had SOB earlier today, appeared uncomfortable. She felt intermittently hot and cold, but no notable rigors. Patient able to ambulate with walker at baseline, uses wheelchair when outdoors. No noted fevers or chills. Has intermittent urine and fecal incontinence, uses diapers. No noted diarrhea or constipation, has BM every 2-3 days, last one was Tuesday or Wednesday. Denies any changes in BM or urine output. No noted orthopnea but per aide, they never lay patient flat on her back. Noted that patient has had a cough for about 5 weeks that worsened yesterday, productive of whitish sputum. Patient does not have a known history of afib per family, nor a history of asthma/COPD or other lung problems. Aide has noticed that patient coughs when she eats or drinks too quickly.        Pt admitted for cough, found to have UTI and consolidations on CXR; course c/b AFIB, which resolved.  Pt started on Zosyn to cover for possible aspiration; pt failed speech/swallow eval, but son wanted pleasure feeds - risks explained in detail in layman's terms to him.  Pt received Zosyn for 5 days. Patient became hypoxic requiring supplemental oxygen after completion of antibiotics. CTA chest obtained to r/o PE and worsening pneumonia which showed a large right pleural effusion with total right lower lobe collapse and a left lower lobe effusion. An incidental liver mass was discovered on this exam, and a follow up CT of the abdomen and pelvis showed enlarged gallbladder with evidence of perforation. Patient was placed on zosyn until 11/14. IR was initially consulted which believed she was a candidate for perc vidhya, however pt's HCP, the son, did not want anything invasive. The risks and benefits not undergoing perc vidhya were explained, including worsening clinical status, sepsis, and death, to the son. The son understood and still wished for non-invasive options. IR did not recommend perc vidhya as her leukocytosis resolved. Patient has been HDS, without fevers, or worsening clinical status. Extensive discussion with family including possibility of patient going into sepsis if abx are stopped were explained. Pt's son understands and understands the risks. Patient was accepted to home hospice, however pts son opts to take patient home as she has 2 private health aides who takes excellent care of pt. During this admission patient's implanted tooth was dislodged, which was evaluated by dental, who recommended no inpatient interventions. Patient is HDS for discharge to home. 89 yo F with PMH of dementia (AAOx2 at baseline) presenting with weakness and SOB x 1 day. Patient has 24/7 HHA who noticed that patient had SOB earlier today, appeared uncomfortable. She felt intermittently hot and cold, but no notable rigors. Patient able to ambulate with walker at baseline, uses wheelchair when outdoors. No noted fevers or chills. Has intermittent urine and fecal incontinence, uses diapers. No noted diarrhea or constipation, has BM every 2-3 days, last one was Tuesday or Wednesday. Denies any changes in BM or urine output. No noted orthopnea but per aide, they never lay patient flat on her back. Noted that patient has had a cough for about 5 weeks that worsened yesterday, productive of whitish sputum. Patient does not have a known history of afib per family, nor a history of asthma/COPD or other lung problems. Aide has noticed that patient coughs when she eats or drinks too quickly.        Pt admitted for cough, found to have UTI and consolidations on CXR; course c/b AFIB, which resolved.  Pt started on Zosyn to cover for possible aspiration; pt failed speech/swallow eval, but son wanted pleasure feeds - risks explained in detail in layman's terms to him.  Pt received Zosyn for 5 days. Patient became hypoxic requiring supplemental oxygen after completion of antibiotics. CTA chest obtained to r/o PE and worsening pneumonia which showed a large right pleural effusion with total right lower lobe collapse and a left lower lobe effusion. An incidental liver mass was discovered on this exam, and a follow up CT of the abdomen and pelvis showed enlarged gallbladder with evidence of perforation. Patient was placed on zosyn until 11/14. IR was initially consulted which believed she was a candidate for perc vidhya, however pt's HCP, the son, did not want anything invasive. The risks and benefits not undergoing perc vidhya were explained, including worsening clinical status, sepsis, and death, to the son. The son understood and still wished for non-invasive options. IR did not recommend perc vidhya as her leukocytosis resolved. Patient has been HDS, without fevers, or worsening clinical status. Extensive discussion with family including possibility of patient going into sepsis if abx are stopped were explained. Pt's son understands and understands the risks. Patient was accepted to home hospice, however pt's son opts to take patient home as she has 2 private health aides who take excellent care of pt. Patient's son plans to transition patient to home hospice 2-3 weeks after discharge. During this admission patient's implanted tooth was dislodged, which was evaluated by dental, who recommended no inpatient interventions. Patient is HDS for discharge to home.

## 2019-11-06 NOTE — DISCHARGE NOTE PROVIDER - NSDCFUSCHEDAPPT_GEN_ALL_CORE_FT
CAROLYN CARRION ; 12/03/2019 ; NPP Card[facility] CAROLYN CARRION ; 12/03/2019 ; NPP Cardio [facility]

## 2019-11-06 NOTE — PROGRESS NOTE ADULT - ASSESSMENT
89 yo F with PMH of dementia (AAOx2 at baseline) presenting with weakness and SOB x 1 day, found to have positive UA, admitted for sepsis likely 2/2 UTI vs asp PNA, also found to have Afib with RVR. Speech and swallow evaluation deemed aspiration risk, resumed pleasure feeds

## 2019-11-06 NOTE — DISCHARGE NOTE PROVIDER - NSDCMRMEDTOKEN_GEN_ALL_CORE_FT
pantoprazole 40 mg oral delayed release tablet: 1 tab(s) orally once a day  polyethylene glycol 3350 oral powder for reconstitution: 17 gram(s) orally once a day  senna oral tablet: 2 tab(s) orally once a day (at bedtime) amoxicillin-clavulanate 875 mg-125 mg oral tablet: 1 tab(s) orally 2 times a day  ciprofloxacin 500 mg oral tablet: 1 tab(s) orally every 12 hours  pantoprazole 40 mg oral delayed release tablet: 1 tab(s) orally once a day  senna oral tablet: 2 tab(s) orally once a day (at bedtime)

## 2019-11-06 NOTE — CHART NOTE - NSCHARTNOTEFT_GEN_A_CORE
Took patient off oxygen yesterday, first saturation at 91, then dropped to 88% so resumed oxygen. Assessed today again off oxygen, first saturation 91 then dropped to 88%

## 2019-11-06 NOTE — DISCHARGE NOTE PROVIDER - CARE PROVIDERS DIRECT ADDRESSES
,DirectAddress_Unknown ,DirectAddress_Unknown,[email or web address] ,DirectAddress_Unknown,[email or web address]

## 2019-11-06 NOTE — DISCHARGE NOTE PROVIDER - CARE PROVIDER_API CALL
Home Hospice,   Phone: (   )    -  Fax: (   )    -  Follow Up Time: Primary Care Doctor,   Phone: (   )    -  Fax: (   )    -  Follow Up Time:     [facility])  Family Medicine  [address] [place] [address], [place] [address]  Phone: [phone]  Fax: [phone]  Follow Up Time: Primary Care Doctor,   Phone: (   )    -  Fax: (   )    -  Follow Up Time: Primary Care Doctor,   Phone: (   )    -  Fax: (   )    -  Follow Up Time:     Sae Gutierrez (MD)  Cardiology; Internal Medicine  [address] [facility], [facility] [address]  [place] [address]  Phone: [phone]  Fax: [phone]  Follow Up Time:

## 2019-11-06 NOTE — DISCHARGE NOTE PROVIDER - NSDCCPCAREPLAN_GEN_ALL_CORE_FT
PRINCIPAL DISCHARGE DIAGNOSIS  Diagnosis: Sepsis  Assessment and Plan of Treatment: You were found to be septic due to aspiration pneumonia and a urinary tract infection.  You received 5 days of Zosyn, an IV antibiotic that treats both urinary and pulmonary infections.  Please follow up with your primary care doctor within 1-2 weeks of discharge.  Please be cautious about eating. PRINCIPAL DISCHARGE DIAGNOSIS  Diagnosis: Sepsis  Assessment and Plan of Treatment: You were found to be septic due to aspiration pneumonia and a urinary tract infection.  You received 5 days of Zosyn, an IV antibiotic that treats both urinary and pulmonary infections.  Please follow up with your primary care doctor within 1-2 weeks of discharge.  Please be cautious about eating.      SECONDARY DISCHARGE DIAGNOSES  Diagnosis: Aspiration pneumonia  Assessment and Plan of Treatment: You have difficulty swallowing, which can cause food to go into the lung and cause an infection. This infection cause a pleural effusion, or a collection of fluid between the lung and the wall of the chest. We decided together not to drain this fluid, and instead send you home with supplemental oxygen to help support your breathing. PRINCIPAL DISCHARGE DIAGNOSIS  Diagnosis: Sepsis  Assessment and Plan of Treatment: You were found to be septic due to aspiration pneumonia and a urinary tract infection.  You received 5 days of Zosyn, an IV antibiotic that treats both urinary and pulmonary infections.  Please follow up with your primary care doctor within 1-2 weeks of discharge.  Please be cautious about eating.  You were also found to have perforated cholecystitis. You were given antiobtics for this. Please continue to take your antibiotics as prescribed.      SECONDARY DISCHARGE DIAGNOSES  Diagnosis: Aspiration pneumonia  Assessment and Plan of Treatment: You have difficulty swallowing, which can cause food to go into the lung and cause an infection. This infection cause a pleural effusion, or a collection of fluid between the lung and the wall of the chest. We decided together not to drain this fluid, and instead send you home with supplemental oxygen to help support your breathing. PRINCIPAL DISCHARGE DIAGNOSIS  Diagnosis: Sepsis  Assessment and Plan of Treatment: You were found to be septic due to aspiration pneumonia and a urinary tract infection.  You received 5 days of Zosyn, an IV antibiotic that treats both urinary and pulmonary infections.  Please follow up with your primary care doctor within 1-2 weeks of discharge.  Please be cautious about eating.  You were also found to have perforated cholecystitis. You were given antiobtics for this. Please continue to take your antibiotics as prescribed.  Please follow up with your PCP, Dr. Gutierrez on 12/3/2019 at 9:40am      SECONDARY DISCHARGE DIAGNOSES  Diagnosis: Aspiration pneumonia  Assessment and Plan of Treatment: You have difficulty swallowing, which can cause food to go into the lung and cause an infection. This infection cause a pleural effusion, or a collection of fluid between the lung and the wall of the chest. We decided together not to drain this fluid, and instead send you home with supplemental oxygen to help support your breathing.

## 2019-11-06 NOTE — DISCHARGE NOTE PROVIDER - PROVIDER TOKENS
FREE:[LAST:[Home Hospice],PHONE:[(   )    -],FAX:[(   )    -]] FREE:[LAST:[Primary Care Doctor],PHONE:[(   )    -],FAX:[(   )    -]],PROVIDER:[TOKEN:[[id number]]] FREE:[LAST:[Primary Care Doctor],PHONE:[(   )    -],FAX:[(   )    -]] FREE:[LAST:[Primary Care Doctor],PHONE:[(   )    -],FAX:[(   )    -]],PROVIDER:[TOKEN:[[id number]]]

## 2019-11-07 DIAGNOSIS — E87.1 HYPO-OSMOLALITY AND HYPONATREMIA: ICD-10-CM

## 2019-11-07 LAB
ALBUMIN SERPL ELPH-MCNC: 2.8 G/DL — LOW (ref 3.3–5)
ALP SERPL-CCNC: 71 U/L — SIGNIFICANT CHANGE UP (ref 40–120)
ALT FLD-CCNC: 15 U/L — SIGNIFICANT CHANGE UP (ref 4–33)
ANION GAP SERPL CALC-SCNC: 12 MMO/L — SIGNIFICANT CHANGE UP (ref 7–14)
ANION GAP SERPL CALC-SCNC: 12 MMO/L — SIGNIFICANT CHANGE UP (ref 7–14)
ANISOCYTOSIS BLD QL: SLIGHT — SIGNIFICANT CHANGE UP
AST SERPL-CCNC: 15 U/L — SIGNIFICANT CHANGE UP (ref 4–32)
BASOPHILS # BLD AUTO: 0.03 K/UL — SIGNIFICANT CHANGE UP (ref 0–0.2)
BASOPHILS NFR BLD AUTO: 0.2 % — SIGNIFICANT CHANGE UP (ref 0–2)
BASOPHILS NFR SPEC: 0 % — SIGNIFICANT CHANGE UP (ref 0–2)
BILIRUB SERPL-MCNC: 0.4 MG/DL — SIGNIFICANT CHANGE UP (ref 0.2–1.2)
BLASTS # FLD: 0 % — SIGNIFICANT CHANGE UP (ref 0–0)
BUN SERPL-MCNC: 19 MG/DL — SIGNIFICANT CHANGE UP (ref 7–23)
BUN SERPL-MCNC: 19 MG/DL — SIGNIFICANT CHANGE UP (ref 7–23)
CALCIUM SERPL-MCNC: 8.9 MG/DL — SIGNIFICANT CHANGE UP (ref 8.4–10.5)
CALCIUM SERPL-MCNC: 9 MG/DL — SIGNIFICANT CHANGE UP (ref 8.4–10.5)
CHLORIDE SERPL-SCNC: 108 MMOL/L — HIGH (ref 98–107)
CHLORIDE SERPL-SCNC: 109 MMOL/L — HIGH (ref 98–107)
CO2 SERPL-SCNC: 29 MMOL/L — SIGNIFICANT CHANGE UP (ref 22–31)
CO2 SERPL-SCNC: 29 MMOL/L — SIGNIFICANT CHANGE UP (ref 22–31)
CREAT SERPL-MCNC: 0.57 MG/DL — SIGNIFICANT CHANGE UP (ref 0.5–1.3)
CREAT SERPL-MCNC: 0.71 MG/DL — SIGNIFICANT CHANGE UP (ref 0.5–1.3)
EOSINOPHIL # BLD AUTO: 0.04 K/UL — SIGNIFICANT CHANGE UP (ref 0–0.5)
EOSINOPHIL NFR BLD AUTO: 0.3 % — SIGNIFICANT CHANGE UP (ref 0–6)
EOSINOPHIL NFR FLD: 0 % — SIGNIFICANT CHANGE UP (ref 0–6)
GLUCOSE SERPL-MCNC: 125 MG/DL — HIGH (ref 70–99)
GLUCOSE SERPL-MCNC: 136 MG/DL — HIGH (ref 70–99)
HCT VFR BLD CALC: 37.1 % — SIGNIFICANT CHANGE UP (ref 34.5–45)
HGB BLD-MCNC: 10.9 G/DL — LOW (ref 11.5–15.5)
IMM GRANULOCYTES NFR BLD AUTO: 2.1 % — HIGH (ref 0–1.5)
LYMPHOCYTES # BLD AUTO: 1.34 K/UL — SIGNIFICANT CHANGE UP (ref 1–3.3)
LYMPHOCYTES # BLD AUTO: 10.5 % — LOW (ref 13–44)
LYMPHOCYTES NFR SPEC AUTO: 5.3 % — LOW (ref 13–44)
MACROCYTES BLD QL: SLIGHT — SIGNIFICANT CHANGE UP
MAGNESIUM SERPL-MCNC: 1.9 MG/DL — SIGNIFICANT CHANGE UP (ref 1.6–2.6)
MCHC RBC-ENTMCNC: 29 PG — SIGNIFICANT CHANGE UP (ref 27–34)
MCHC RBC-ENTMCNC: 29.4 % — LOW (ref 32–36)
MCV RBC AUTO: 98.7 FL — SIGNIFICANT CHANGE UP (ref 80–100)
METAMYELOCYTES # FLD: 0 % — SIGNIFICANT CHANGE UP (ref 0–1)
MONOCYTES # BLD AUTO: 0.67 K/UL — SIGNIFICANT CHANGE UP (ref 0–0.9)
MONOCYTES NFR BLD AUTO: 5.3 % — SIGNIFICANT CHANGE UP (ref 2–14)
MONOCYTES NFR BLD: 4.5 % — SIGNIFICANT CHANGE UP (ref 2–9)
MYELOCYTES NFR BLD: 0 % — SIGNIFICANT CHANGE UP (ref 0–0)
NEUTROPHIL AB SER-ACNC: 84.8 % — HIGH (ref 43–77)
NEUTROPHILS # BLD AUTO: 10.36 K/UL — HIGH (ref 1.8–7.4)
NEUTROPHILS NFR BLD AUTO: 81.6 % — HIGH (ref 43–77)
NEUTS BAND # BLD: 0 % — SIGNIFICANT CHANGE UP (ref 0–6)
NRBC # FLD: 0 K/UL — SIGNIFICANT CHANGE UP (ref 0–0)
OTHER - HEMATOLOGY %: 0 — SIGNIFICANT CHANGE UP
PHOSPHATE SERPL-MCNC: 3.2 MG/DL — SIGNIFICANT CHANGE UP (ref 2.5–4.5)
PLATELET # BLD AUTO: 179 K/UL — SIGNIFICANT CHANGE UP (ref 150–400)
PMV BLD: 11.3 FL — SIGNIFICANT CHANGE UP (ref 7–13)
POIKILOCYTOSIS BLD QL AUTO: SLIGHT — SIGNIFICANT CHANGE UP
POLYCHROMASIA BLD QL SMEAR: SLIGHT — SIGNIFICANT CHANGE UP
POTASSIUM SERPL-MCNC: 3.2 MMOL/L — LOW (ref 3.5–5.3)
POTASSIUM SERPL-MCNC: 3.5 MMOL/L — SIGNIFICANT CHANGE UP (ref 3.5–5.3)
POTASSIUM SERPL-SCNC: 3.2 MMOL/L — LOW (ref 3.5–5.3)
POTASSIUM SERPL-SCNC: 3.5 MMOL/L — SIGNIFICANT CHANGE UP (ref 3.5–5.3)
PROMYELOCYTES # FLD: 0 % — SIGNIFICANT CHANGE UP (ref 0–0)
PROT SERPL-MCNC: 6.3 G/DL — SIGNIFICANT CHANGE UP (ref 6–8.3)
RBC # BLD: 3.76 M/UL — LOW (ref 3.8–5.2)
RBC # FLD: 14.4 % — SIGNIFICANT CHANGE UP (ref 10.3–14.5)
REVIEW TO FOLLOW: YES — SIGNIFICANT CHANGE UP
SODIUM SERPL-SCNC: 149 MMOL/L — HIGH (ref 135–145)
SODIUM SERPL-SCNC: 150 MMOL/L — HIGH (ref 135–145)
VARIANT LYMPHS # BLD: 5.4 % — SIGNIFICANT CHANGE UP
WBC # BLD: 12.71 K/UL — HIGH (ref 3.8–10.5)
WBC # FLD AUTO: 12.71 K/UL — HIGH (ref 3.8–10.5)

## 2019-11-07 PROCEDURE — 99223 1ST HOSP IP/OBS HIGH 75: CPT | Mod: GC

## 2019-11-07 PROCEDURE — 99233 SBSQ HOSP IP/OBS HIGH 50: CPT | Mod: GC

## 2019-11-07 PROCEDURE — 71275 CT ANGIOGRAPHY CHEST: CPT | Mod: 26

## 2019-11-07 RX ORDER — SODIUM CHLORIDE 9 MG/ML
1000 INJECTION, SOLUTION INTRAVENOUS
Refills: 0 | Status: COMPLETED | OUTPATIENT
Start: 2019-11-07 | End: 2019-11-07

## 2019-11-07 RX ORDER — SODIUM CHLORIDE 9 MG/ML
1000 INJECTION, SOLUTION INTRAVENOUS
Refills: 0 | Status: DISCONTINUED | OUTPATIENT
Start: 2019-11-07 | End: 2019-11-08

## 2019-11-07 RX ORDER — POTASSIUM CHLORIDE 20 MEQ
40 PACKET (EA) ORAL ONCE
Refills: 0 | Status: COMPLETED | OUTPATIENT
Start: 2019-11-07 | End: 2019-11-07

## 2019-11-07 RX ADMIN — Medication 3 MILLILITER(S): at 10:27

## 2019-11-07 RX ADMIN — SODIUM CHLORIDE 3 MILLILITER(S): 9 INJECTION INTRAMUSCULAR; INTRAVENOUS; SUBCUTANEOUS at 10:16

## 2019-11-07 RX ADMIN — Medication 3 MILLILITER(S): at 16:31

## 2019-11-07 RX ADMIN — Medication 3 MILLILITER(S): at 23:27

## 2019-11-07 RX ADMIN — SENNA PLUS 2 TABLET(S): 8.6 TABLET ORAL at 21:41

## 2019-11-07 RX ADMIN — Medication 100 MILLIGRAM(S): at 18:07

## 2019-11-07 RX ADMIN — Medication 3 MILLILITER(S): at 03:15

## 2019-11-07 RX ADMIN — Medication 40 MILLIEQUIVALENT(S): at 08:43

## 2019-11-07 RX ADMIN — PIPERACILLIN AND TAZOBACTAM 25 GRAM(S): 4; .5 INJECTION, POWDER, LYOPHILIZED, FOR SOLUTION INTRAVENOUS at 13:54

## 2019-11-07 RX ADMIN — PIPERACILLIN AND TAZOBACTAM 25 GRAM(S): 4; .5 INJECTION, POWDER, LYOPHILIZED, FOR SOLUTION INTRAVENOUS at 06:11

## 2019-11-07 RX ADMIN — SODIUM CHLORIDE 3 MILLILITER(S): 9 INJECTION INTRAMUSCULAR; INTRAVENOUS; SUBCUTANEOUS at 23:27

## 2019-11-07 RX ADMIN — Medication 100 MILLIGRAM(S): at 06:12

## 2019-11-07 RX ADMIN — ENOXAPARIN SODIUM 30 MILLIGRAM(S): 100 INJECTION SUBCUTANEOUS at 12:23

## 2019-11-07 RX ADMIN — PIPERACILLIN AND TAZOBACTAM 25 GRAM(S): 4; .5 INJECTION, POWDER, LYOPHILIZED, FOR SOLUTION INTRAVENOUS at 21:41

## 2019-11-07 RX ADMIN — SODIUM CHLORIDE 3 MILLILITER(S): 9 INJECTION INTRAMUSCULAR; INTRAVENOUS; SUBCUTANEOUS at 16:22

## 2019-11-07 RX ADMIN — Medication 100 MILLIGRAM(S): at 00:11

## 2019-11-07 RX ADMIN — Medication 100 MILLIGRAM(S): at 12:23

## 2019-11-07 RX ADMIN — POLYETHYLENE GLYCOL 3350 17 GRAM(S): 17 POWDER, FOR SOLUTION ORAL at 12:23

## 2019-11-07 RX ADMIN — SODIUM CHLORIDE 50 MILLILITER(S): 9 INJECTION, SOLUTION INTRAVENOUS at 09:55

## 2019-11-07 NOTE — CHART NOTE - NSCHARTNOTEFT_GEN_A_CORE
Patient is to be transferred to outside hospital. Patient will require electric hospital bed with HOB elevation to 30 degrees for aspiration precuations. She will require frequent body position changes not feasible in traditional hospital bed. Gel overlay needed to prevent skin breakdown. Patient will require electric hospital bed with HOB elevation to 30 degrees for aspiration precuations. She will require frequent body position changes not feasible in traditional hospital bed. Gel overlay needed to prevent skin breakdown.

## 2019-11-07 NOTE — PROGRESS NOTE ADULT - PROBLEM SELECTOR PLAN 3
Presented febrile, tachy, elevated WBC. UA positive. CXR with possible L effusion. RVP negative.   - sepsis likely 2/2 aspiration PNA given the h/o of coughing with PO intake (and gurgling)  - c/w 3.375zosyn (11/1-11/8)

## 2019-11-07 NOTE — PROGRESS NOTE ADULT - PROBLEM SELECTOR PLAN 7
systolic murmur radiating to carotids noted on exam  - ctm volume status in the setting of fluid resuscitation for hyponatremia

## 2019-11-07 NOTE — PROGRESS NOTE ADULT - ASSESSMENT
89 yo F with PMH of dementia (AAOx2 at baseline) presenting with weakness and SOB x 1 day, found to have positive UA, in A fib with RVR, admitted for sepsis likely 2/2 aspiration PNA, course complicated by hypoxia with suspicion for PE.

## 2019-11-07 NOTE — PROGRESS NOTE ADULT - SUBJECTIVE AND OBJECTIVE BOX
PROGRESS NOTE:   Authoted by Dr. Jose Cruz Callejas MD, Pager [phone] [facility], [id number] [facility]     Patient is a 89y old  Female who presents with a chief complaint of SOB (07 Nov 2019 06:28)      SUBJECTIVE / OVERNIGHT EVENTS: Overnight patient was unable to get her CTA. Patient seen and examined at bedside. No complaints this morning.    ADDITIONAL REVIEW OF SYSTEMS: Denies cp, sob, f, c.    MEDICATIONS  (STANDING):  albuterol/ipratropium for Nebulization 3 milliLiter(s) Nebulizer every 6 hours  dextrose 5%. 1000 milliLiter(s) (50 mL/Hr) IV Continuous <Continuous>  enoxaparin Injectable 30 milliGRAM(s) SubCutaneous daily  guaiFENesin    Syrup 100 milliGRAM(s) Oral every 6 hours  influenza   Vaccine 0.5 milliLiter(s) IntraMuscular once  piperacillin/tazobactam IVPB.. 3.375 Gram(s) IV Intermittent every 8 hours  polyethylene glycol 3350 17 Gram(s) Oral daily  senna 2 Tablet(s) Oral at bedtime  sodium chloride 3%  Inhalation 3 milliLiter(s) Inhalation three times a day  sorbitol 70%/mineral oil/magnesium hydroxide/glycerin Enema 120 milliLiter(s) Rectal once    MEDICATIONS  (PRN):  acetaminophen   Tablet .. 650 milliGRAM(s) Oral every 6 hours PRN Temp greater or equal to 38C (100.4F), Mild Pain (1 - 3), Moderate Pain (4 - 6), Severe Pain (7 - 10)      CAPILLARY BLOOD GLUCOSE        I&O's Summary    06 Nov 2019 07:01  -  07 Nov 2019 07:00  --------------------------------------------------------  IN: 100 mL / OUT: 500 mL / NET: -400 mL        PHYSICAL EXAM:  Vital Signs Last 24 Hrs  T(C): 36.7 (07 Nov 2019 06:09), Max: 37.1 (06 Nov 2019 21:49)  T(F): 98.1 (07 Nov 2019 06:09), Max: 98.7 (06 Nov 2019 21:49)  HR: 99 (07 Nov 2019 06:09) (95 - 101)  BP: 123/69 (07 Nov 2019 06:09) (105/71 - 129/75)  BP(mean): --  RR: 17 (07 Nov 2019 06:09) (17 - 17)  SpO2: 96% (07 Nov 2019 06:09) (88% - 96%)    CONSTITUTIONAL: NAD, well-developed  RESPIRATORY: Normal respiratory effort with good air entry. Crackles in L lung from base to mid-lung posteriorly.  CARDIOVASCULAR: Irregularly irregular rhythm, normal S1 and S2, no murmur/rub/gallop; No lower extremity edema;   ABDOMEN: Nontender to palpation, normoactive bowel sounds, no rebound/guarding; No hepatosplenomegaly  MUSCLOSKELETAL: no clubbing or cyanosis of digits; no joint swelling or tenderness to palpation  PSYCH: AAOx1 to person; affect appropriate      LABS:                        10.9   12.71 )-----------( 179      ( 07 Nov 2019 06:00 )             37.1     11-07    150<H>  |  109<H>  |  19  ----------------------------<  125<H>  3.2<L>   |  29  |  0.71    Ca    8.9      07 Nov 2019 06:00  Phos  3.2     11-07  Mg     1.9     11-07    TPro  6.3  /  Alb  2.8<L>  /  TBili  0.4  /  DBili  x   /  AST  15  /  ALT  15  /  AlkPhos  71  11-07                RADIOLOGY & ADDITIONAL TESTS:  Results Reviewed: Yes  Imaging Personally Reviewed: N    COORDINATION OF CARE:  Care Discussed with Consultants/Other Providers [Y/N]: N  Prior or Outpatient Records Reviewed [Y/N]: N

## 2019-11-07 NOTE — PROGRESS NOTE ADULT - PROBLEM SELECTOR PLAN 2
Sodium slowly uptrending. Patient euvolemic and s/p trial of lasix for hypoxia.  -D5 50cc/hr for 10 hours  -Repeat BMP tonight at 10PM to further assess

## 2019-11-07 NOTE — PROGRESS NOTE ADULT - PROBLEM SELECTOR PLAN 6
CHADsVASc 3. Afib likely driven by sepsis.   - will monitor off AC for now; h/o GIB in past   - soft BPs, will hold off on rate control, HR currently in the 90s.

## 2019-11-07 NOTE — PROGRESS NOTE ADULT - PROBLEM SELECTOR PLAN 1
Likely 2/2 edema but cannot r/o PE. In the setting of diastolic dysfunction.  - Patient back on NC requirements  - s/p low dose trial of lasix  - CTA to rule out PE, confirm

## 2019-11-07 NOTE — CONSULT NOTE ADULT - ASSESSMENT
89 yo woman with dementia presenting to the hospital for shortness of breath and weakness. Admitted for aspiration pneumonia and atrial fibrillation with rapid ventricular response. Pulmonary service consulted to evaluate for possible thoracentesis given findings of right pleural effusion on CT chest.     1. Right sided pleural effusion   - CT chest reviewed - there is small pleural effusion with overlying area of consolidation  - Risks of thoracentesis outweigh benefits and she is otherwise resting comfortable in bed  - Continue IV antibiotics for aspiration pneumonia    --------------------------------------------------------  Ashish Mariano, PGY-4  Pulmonary/Critical Care Fellow  Pager: [phone] ([facility]), [phone] ([facility])  Pulmonary spectra: [id number]

## 2019-11-08 DIAGNOSIS — R16.0 HEPATOMEGALY, NOT ELSEWHERE CLASSIFIED: ICD-10-CM

## 2019-11-08 LAB
ANION GAP SERPL CALC-SCNC: 9 MMO/L — SIGNIFICANT CHANGE UP (ref 7–14)
BACTERIA BLD CULT: SIGNIFICANT CHANGE UP
BACTERIA BLD CULT: SIGNIFICANT CHANGE UP
BASOPHILS # BLD AUTO: 0.04 K/UL — SIGNIFICANT CHANGE UP (ref 0–0.2)
BASOPHILS NFR BLD AUTO: 0.3 % — SIGNIFICANT CHANGE UP (ref 0–2)
BUN SERPL-MCNC: 17 MG/DL — SIGNIFICANT CHANGE UP (ref 7–23)
CALCIUM SERPL-MCNC: 8.7 MG/DL — SIGNIFICANT CHANGE UP (ref 8.4–10.5)
CHLORIDE SERPL-SCNC: 105 MMOL/L — SIGNIFICANT CHANGE UP (ref 98–107)
CO2 SERPL-SCNC: 30 MMOL/L — SIGNIFICANT CHANGE UP (ref 22–31)
CREAT SERPL-MCNC: 0.55 MG/DL — SIGNIFICANT CHANGE UP (ref 0.5–1.3)
EOSINOPHIL # BLD AUTO: 0.07 K/UL — SIGNIFICANT CHANGE UP (ref 0–0.5)
EOSINOPHIL NFR BLD AUTO: 0.5 % — SIGNIFICANT CHANGE UP (ref 0–6)
GLUCOSE SERPL-MCNC: 130 MG/DL — HIGH (ref 70–99)
HCT VFR BLD CALC: 36.2 % — SIGNIFICANT CHANGE UP (ref 34.5–45)
HGB BLD-MCNC: 11 G/DL — LOW (ref 11.5–15.5)
IMM GRANULOCYTES NFR BLD AUTO: 1.2 % — SIGNIFICANT CHANGE UP (ref 0–1.5)
LYMPHOCYTES # BLD AUTO: 1.4 K/UL — SIGNIFICANT CHANGE UP (ref 1–3.3)
LYMPHOCYTES # BLD AUTO: 11 % — LOW (ref 13–44)
MAGNESIUM SERPL-MCNC: 1.9 MG/DL — SIGNIFICANT CHANGE UP (ref 1.6–2.6)
MCHC RBC-ENTMCNC: 30.2 PG — SIGNIFICANT CHANGE UP (ref 27–34)
MCHC RBC-ENTMCNC: 30.4 % — LOW (ref 32–36)
MCV RBC AUTO: 99.5 FL — SIGNIFICANT CHANGE UP (ref 80–100)
MONOCYTES # BLD AUTO: 0.6 K/UL — SIGNIFICANT CHANGE UP (ref 0–0.9)
MONOCYTES NFR BLD AUTO: 4.7 % — SIGNIFICANT CHANGE UP (ref 2–14)
NEUTROPHILS # BLD AUTO: 10.49 K/UL — HIGH (ref 1.8–7.4)
NEUTROPHILS NFR BLD AUTO: 82.3 % — HIGH (ref 43–77)
NRBC # FLD: 0 K/UL — SIGNIFICANT CHANGE UP (ref 0–0)
PHOSPHATE SERPL-MCNC: 3.2 MG/DL — SIGNIFICANT CHANGE UP (ref 2.5–4.5)
PLATELET # BLD AUTO: 150 K/UL — SIGNIFICANT CHANGE UP (ref 150–400)
PMV BLD: 11.2 FL — SIGNIFICANT CHANGE UP (ref 7–13)
POTASSIUM SERPL-MCNC: 3 MMOL/L — LOW (ref 3.5–5.3)
POTASSIUM SERPL-SCNC: 3 MMOL/L — LOW (ref 3.5–5.3)
RBC # BLD: 3.64 M/UL — LOW (ref 3.8–5.2)
RBC # FLD: 14.5 % — SIGNIFICANT CHANGE UP (ref 10.3–14.5)
SODIUM SERPL-SCNC: 144 MMOL/L — SIGNIFICANT CHANGE UP (ref 135–145)
WBC # BLD: 12.75 K/UL — HIGH (ref 3.8–10.5)
WBC # FLD AUTO: 12.75 K/UL — HIGH (ref 3.8–10.5)

## 2019-11-08 PROCEDURE — 99232 SBSQ HOSP IP/OBS MODERATE 35: CPT | Mod: GC

## 2019-11-08 PROCEDURE — 74177 CT ABD & PELVIS W/CONTRAST: CPT | Mod: 26

## 2019-11-08 PROCEDURE — 99233 SBSQ HOSP IP/OBS HIGH 50: CPT | Mod: GC

## 2019-11-08 RX ORDER — POTASSIUM CHLORIDE 20 MEQ
40 PACKET (EA) ORAL ONCE
Refills: 0 | Status: COMPLETED | OUTPATIENT
Start: 2019-11-08 | End: 2019-11-08

## 2019-11-08 RX ORDER — PIPERACILLIN AND TAZOBACTAM 4; .5 G/20ML; G/20ML
3.38 INJECTION, POWDER, LYOPHILIZED, FOR SOLUTION INTRAVENOUS EVERY 8 HOURS
Refills: 0 | Status: DISCONTINUED | OUTPATIENT
Start: 2019-11-08 | End: 2019-11-15

## 2019-11-08 RX ORDER — POTASSIUM CHLORIDE 20 MEQ
10 PACKET (EA) ORAL
Refills: 0 | Status: COMPLETED | OUTPATIENT
Start: 2019-11-08 | End: 2019-11-08

## 2019-11-08 RX ADMIN — Medication 3 MILLILITER(S): at 14:50

## 2019-11-08 RX ADMIN — Medication 100 MILLIGRAM(S): at 18:34

## 2019-11-08 RX ADMIN — Medication 100 MILLIEQUIVALENT(S): at 09:32

## 2019-11-08 RX ADMIN — Medication 3 MILLILITER(S): at 03:52

## 2019-11-08 RX ADMIN — SODIUM CHLORIDE 3 MILLILITER(S): 9 INJECTION INTRAMUSCULAR; INTRAVENOUS; SUBCUTANEOUS at 08:46

## 2019-11-08 RX ADMIN — SODIUM CHLORIDE 3 MILLILITER(S): 9 INJECTION INTRAMUSCULAR; INTRAVENOUS; SUBCUTANEOUS at 14:51

## 2019-11-08 RX ADMIN — SENNA PLUS 2 TABLET(S): 8.6 TABLET ORAL at 21:27

## 2019-11-08 RX ADMIN — Medication 40 MILLIEQUIVALENT(S): at 12:13

## 2019-11-08 RX ADMIN — Medication 3 MILLILITER(S): at 22:22

## 2019-11-08 RX ADMIN — SODIUM CHLORIDE 50 MILLILITER(S): 9 INJECTION, SOLUTION INTRAVENOUS at 00:08

## 2019-11-08 RX ADMIN — Medication 100 MILLIGRAM(S): at 05:35

## 2019-11-08 RX ADMIN — PIPERACILLIN AND TAZOBACTAM 25 GRAM(S): 4; .5 INJECTION, POWDER, LYOPHILIZED, FOR SOLUTION INTRAVENOUS at 05:29

## 2019-11-08 RX ADMIN — SODIUM CHLORIDE 50 MILLILITER(S): 9 INJECTION, SOLUTION INTRAVENOUS at 06:28

## 2019-11-08 RX ADMIN — Medication 100 MILLIGRAM(S): at 12:13

## 2019-11-08 RX ADMIN — POLYETHYLENE GLYCOL 3350 17 GRAM(S): 17 POWDER, FOR SOLUTION ORAL at 12:13

## 2019-11-08 RX ADMIN — Medication 100 MILLIEQUIVALENT(S): at 08:06

## 2019-11-08 RX ADMIN — ENOXAPARIN SODIUM 30 MILLIGRAM(S): 100 INJECTION SUBCUTANEOUS at 12:14

## 2019-11-08 RX ADMIN — Medication 3 MILLILITER(S): at 08:47

## 2019-11-08 RX ADMIN — Medication 100 MILLIGRAM(S): at 00:09

## 2019-11-08 RX ADMIN — PIPERACILLIN AND TAZOBACTAM 25 GRAM(S): 4; .5 INJECTION, POWDER, LYOPHILIZED, FOR SOLUTION INTRAVENOUS at 21:26

## 2019-11-08 RX ADMIN — SODIUM CHLORIDE 3 MILLILITER(S): 9 INJECTION INTRAMUSCULAR; INTRAVENOUS; SUBCUTANEOUS at 22:28

## 2019-11-08 RX ADMIN — Medication 100 MILLIEQUIVALENT(S): at 10:53

## 2019-11-08 NOTE — PROGRESS NOTE ADULT - PROBLEM SELECTOR PLAN 1
Likely 2/2 edema but cannot r/o PE. In the setting of diastolic dysfunction.  - Patient back on NC requirements  - s/p low dose trial of lasix  - CTA to rule out PE, confirm R pleural effusion > L pleural effusion with RLL total lung collapse seen on CTA. No PE.  - Hold thoracentesis given risks > benefits  - c/w aspiration precautions, nasal cannula R pleural effusion > L pleural effusion with RLL total lung collapse seen on CTA. No PE.  - Holding thoracentesis given risks > benefits  - Will trial off NC today  - Continuous pulse-ox overnight to determine oxygen needs  - c/w aspiration precautions

## 2019-11-08 NOTE — PROGRESS NOTE ADULT - ASSESSMENT
89 yo woman with dementia presenting to the hospital for shortness of breath and weakness. Admitted for aspiration pneumonia and atrial fibrillation with rapid ventricular response. Pulmonary service consulted to evaluate for possible thoracentesis given findings of right pleural effusion on CT chest.     1. Right sided pleural effusion   - CT chest reviewed - there is small pleural effusion with overlying area of consolidation  - Risks of thoracentesis outweigh benefits and she is otherwise resting comfortable in bed  - Continue IV antibiotics for aspiration pneumonia    --------------------------------------------------------  Ashish Mariano, PGY-4  Pulmonary/Critical Care Fellow  Pager: [phone] ([facility]), [phone] ([place])  Pulmonary spectra: [id number] 89 yo woman with dementia presenting to the hospital for shortness of breath and weakness. Admitted for aspiration pneumonia and atrial fibrillation with rapid ventricular response. Pulmonary service consulted to evaluate for possible thoracentesis given findings of right pleural effusion on CT chest.     1. Right sided pleural effusion   - CT chest reviewed - there is small pleural effusion with overlying area of consolidation  - Risks of thoracentesis outweigh benefits and she is otherwise resting comfortable in bed  - Continue IV antibiotics for aspiration pneumonia    Will sign off at this time. Please call us back if any questions  --------------------------------------------------------  Ashish Mariano, PGY-4  Pulmonary/Critical Care Fellow  Pager: [phone] ([facility]), [phone] ([facility])  Pulmonary spectra: [id number]

## 2019-11-08 NOTE — PROGRESS NOTE ADULT - PROBLEM SELECTOR PLAN 3
Presented febrile, tachy, elevated WBC. UA positive. CXR with possible L effusion. RVP negative.   - sepsis likely 2/2 aspiration PNA given the h/o of coughing with PO intake (and gurgling)  - c/w 3.375zosyn (11/1-11/8) Presented febrile, tachy, elevated WBC. UA positive. CXR with possible L effusion.   - sepsis likely 2/2 aspiration PNA given the h/o of coughing with PO intake (and gurgling)  - s/p 3.375 zosyn (11/1-11/8) Incidental liver mass found on CT chest. Son wants follow-up imaging to get better view. Unclear at this time if he wants to intervene.  -CT AP with IV contrast to further evaluate

## 2019-11-08 NOTE — PROGRESS NOTE ADULT - SUBJECTIVE AND OBJECTIVE BOX
PROGRESS NOTE:   Authoted by Dr. Jose Cruz Callejas MD, Pager [phone] [facility], [id number] [facility]     Patient is a 89y old  Female who presents with a chief complaint of SOB (08 Nov 2019 06:25)      SUBJECTIVE / OVERNIGHT EVENTS: No acute events overnight. Patient seen and examined at bedside. Denies cp/sob/cough/n/v.     ADDITIONAL REVIEW OF SYSTEMS:    MEDICATIONS  (STANDING):  albuterol/ipratropium for Nebulization 3 milliLiter(s) Nebulizer every 6 hours  enoxaparin Injectable 30 milliGRAM(s) SubCutaneous daily  guaiFENesin    Syrup 100 milliGRAM(s) Oral every 6 hours  influenza   Vaccine 0.5 milliLiter(s) IntraMuscular once  piperacillin/tazobactam IVPB.. 3.375 Gram(s) IV Intermittent every 8 hours  polyethylene glycol 3350 17 Gram(s) Oral daily  potassium chloride   Powder 40 milliEquivalent(s) Oral once  potassium chloride  10 mEq/100 mL IVPB 10 milliEquivalent(s) IV Intermittent every 1 hour  senna 2 Tablet(s) Oral at bedtime  sodium chloride 3%  Inhalation 3 milliLiter(s) Inhalation three times a day  sorbitol 70%/mineral oil/magnesium hydroxide/glycerin Enema 120 milliLiter(s) Rectal once    MEDICATIONS  (PRN):  acetaminophen   Tablet .. 650 milliGRAM(s) Oral every 6 hours PRN Temp greater or equal to 38C (100.4F), Mild Pain (1 - 3), Moderate Pain (4 - 6), Severe Pain (7 - 10)      CAPILLARY BLOOD GLUCOSE        I&O's Summary    07 Nov 2019 07:01  -  08 Nov 2019 07:00  --------------------------------------------------------  IN: 300 mL / OUT: 350 mL / NET: -50 mL        PHYSICAL EXAM:  Vital Signs Last 24 Hrs  T(C): 36.2 (08 Nov 2019 05:27), Max: 36.7 (07 Nov 2019 15:21)  T(F): 97.2 (08 Nov 2019 05:27), Max: 98.1 (07 Nov 2019 21:10)  HR: 91 (08 Nov 2019 05:27) (90 - 107)  BP: 114/69 (08 Nov 2019 05:27) (106/67 - 127/75)  BP(mean): --  RR: 17 (08 Nov 2019 05:27) (17 - 18)  SpO2: 98% (08 Nov 2019 05:27) (94% - 98%)    CONSTITUTIONAL: NAD, well-developed  RESPIRATORY: Normal respiratory effort with good air entry. Crackles in L lung from base to mid-lung posteriorly. R lung decreased breath sounds at lung base.  CARDIOVASCULAR: Irregularly irregular rhythm, normal S1 and S2, no murmur/rub/gallop; No lower extremity edema;   ABDOMEN: Nontender to palpation, normoactive bowel sounds, no rebound/guarding; No hepatosplenomegaly  MUSCLOSKELETAL: no clubbing or cyanosis of digits; no joint swelling or tenderness to palpation  PSYCH: AAOx1 to person; affect appropriate    LABS:                        11.0   12.75 )-----------( 150      ( 08 Nov 2019 05:45 )             36.2     11-08    144  |  105  |  17  ----------------------------<  130<H>  3.0<L>   |  30  |  0.55    Ca    8.7      08 Nov 2019 05:45  Phos  3.2     11-08  Mg     1.9     11-08    TPro  6.3  /  Alb  2.8<L>  /  TBili  0.4  /  DBili  x   /  AST  15  /  ALT  15  /  AlkPhos  71  11-07                RADIOLOGY & ADDITIONAL TESTS:  Results Reviewed: CT chest with IV contrast  Imaging Personally Reviewed: No    COORDINATION OF CARE:  Care Discussed with Consultants/Other Providers [Y/N]: Yes (Pulmonology)  Prior or Outpatient Records Reviewed [Y/N]: Y PROGRESS NOTE:   Authoted by Dr. Jose Cruz Callejas MD, Pager [phone] [facility], [id number] [facility]     Patient is a 89y old  Female who presents with a chief complaint of SOB (08 Nov 2019 06:25)      SUBJECTIVE / OVERNIGHT EVENTS: No acute events overnight. Patient seen and examined at bedside. Denies cp/sob/cough/n/v.     ADDITIONAL REVIEW OF SYSTEMS:    MEDICATIONS  (STANDING):  albuterol/ipratropium for Nebulization 3 milliLiter(s) Nebulizer every 6 hours  enoxaparin Injectable 30 milliGRAM(s) SubCutaneous daily  guaiFENesin    Syrup 100 milliGRAM(s) Oral every 6 hours  influenza   Vaccine 0.5 milliLiter(s) IntraMuscular once  piperacillin/tazobactam IVPB.. 3.375 Gram(s) IV Intermittent every 8 hours  polyethylene glycol 3350 17 Gram(s) Oral daily  potassium chloride   Powder 40 milliEquivalent(s) Oral once  potassium chloride  10 mEq/100 mL IVPB 10 milliEquivalent(s) IV Intermittent every 1 hour  senna 2 Tablet(s) Oral at bedtime  sodium chloride 3%  Inhalation 3 milliLiter(s) Inhalation three times a day  sorbitol 70%/mineral oil/magnesium hydroxide/glycerin Enema 120 milliLiter(s) Rectal once    MEDICATIONS  (PRN):  acetaminophen   Tablet .. 650 milliGRAM(s) Oral every 6 hours PRN Temp greater or equal to 38C (100.4F), Mild Pain (1 - 3), Moderate Pain (4 - 6), Severe Pain (7 - 10)      CAPILLARY BLOOD GLUCOSE        I&O's Summary    07 Nov 2019 07:01  -  08 Nov 2019 07:00  --------------------------------------------------------  IN: 300 mL / OUT: 350 mL / NET: -50 mL        PHYSICAL EXAM:  Vital Signs Last 24 Hrs  T(C): 36.2 (08 Nov 2019 05:27), Max: 36.7 (07 Nov 2019 15:21)  T(F): 97.2 (08 Nov 2019 05:27), Max: 98.1 (07 Nov 2019 21:10)  HR: 91 (08 Nov 2019 05:27) (90 - 107)  BP: 114/69 (08 Nov 2019 05:27) (106/67 - 127/75)  BP(mean): --  RR: 17 (08 Nov 2019 05:27) (17 - 18)  SpO2: 98% (08 Nov 2019 05:27) (94% - 98%)    CONSTITUTIONAL: NAD, well-developed  RESPIRATORY: Normal respiratory effort with good air entry. Crackles in L lung from base to mid-lung posteriorly. R lung decreased breath sounds at lung base.  CARDIOVASCULAR: Irregularly irregular rhythm, normal S1 and S2, with 2/6 systolic murmur radiating to the carotids; No lower extremity edema.  ABDOMEN: Nontender to palpation, normoactive bowel sounds, no rebound/guarding; No hepatosplenomegaly  MUSCLOSKELETAL: no clubbing or cyanosis of digits; no joint swelling or tenderness to palpation  PSYCH: AAOx1 to person; affect appropriate    LABS:                        11.0   12.75 )-----------( 150      ( 08 Nov 2019 05:45 )             36.2     11-08    144  |  105  |  17  ----------------------------<  130<H>  3.0<L>   |  30  |  0.55    Ca    8.7      08 Nov 2019 05:45  Phos  3.2     11-08  Mg     1.9     11-08    TPro  6.3  /  Alb  2.8<L>  /  TBili  0.4  /  DBili  x   /  AST  15  /  ALT  15  /  AlkPhos  71  11-07                RADIOLOGY & ADDITIONAL TESTS:  Results Reviewed: CT chest with IV contrast  Imaging Personally Reviewed: No    COORDINATION OF CARE:  Care Discussed with Consultants/Other Providers [Y/N]: Yes (Pulmonology)  Prior or Outpatient Records Reviewed [Y/N]: Y

## 2019-11-08 NOTE — CONSULT NOTE ADULT - SUBJECTIVE AND OBJECTIVE BOX
General Surgery Consult      Consulting surgical team:   Consulting attending:       HPI:  89 yo F with PMH of dementia (AAOx2 at baseline) presenting with weakness and SOB x 1 day. Patient has 24/7 HHA who noticed that patient had SOB earlier today, appeared uncomfortable. She felt intermittently hot and cold, but no notable rigors. Patient able to ambulate with walker at baseline, uses wheelchair when outdoors. No noted fevers or chills. Has intermittent urine and fecal incontinence, uses diapers. No noted diarrhea or constipation, has BM every 2-3 days, last one was Tuesday or Wednesday. Denies any changes in BM or urine output. No noted orthopnea but per aide, they never lay patient flat on her back. Noted that patient has had a cough for about 5 weeks that worsened yesterday, productive of whitish sputum. Patient does not have a known history of afib per family, nor a history of asthma/COPD or other lung problems. Aide has noticed that patient coughs when she eats or drinks too quickly.    In ED vitals: T 98.6 (Tm 102.5 rectally), , /98, RR 22, O2 96% on 2L.   Labs notable for leukocytosis of 21.97, elevated pro-BNP 34489, and elevated lacate 3.4->2.7.  Given tylenol 1g, zosyn, vanc, 500cc NS x2. (01 Nov 2019 17:28)      Interval:  Patient with incidental hepatic mass found on CT chest on 11/7. On CT on 11/8 to further evaluate mass was found to have perforated cholecystitis. Surgery was consulted for recommendation and management. Patient with WBC 12.7 and afebrile. Denies abd discomfort, N/V, CP, SOB, dysuria, diarrhea, or constipation.         PAST MEDICAL HISTORY:  Dementia  No significant past medical history        PAST SURGICAL HISTORY:  History of hip surgery        MEDICATIONS:        ALLERGIES:  No Known Allergies        VITALS & I/Os:  Vital Signs Last 24 Hrs  T(C): 36.3 (08 Nov 2019 14:56), Max: 36.7 (07 Nov 2019 21:10)  T(F): 97.3 (08 Nov 2019 14:56), Max: 98.1 (07 Nov 2019 21:10)  HR: 88 (08 Nov 2019 14:56) (64 - 105)  BP: 134/74 (08 Nov 2019 14:56) (106/67 - 134/74)  BP(mean): --  RR: 17 (08 Nov 2019 14:56) (17 - 18)  SpO2: 97% (08 Nov 2019 14:56) (94% - 98%)    I&O's Summary    07 Nov 2019 07:01  -  08 Nov 2019 07:00  --------------------------------------------------------  IN: 300 mL / OUT: 350 mL / NET: -50 mL          PHYSICAL EXAM:  General: No acute distress  Respiratory: Nonlabored  Cardiovascular: normotensive, regular rate  Groin: primafit in place  Abdominal: Soft, nondistended, nontender        LABS:                        11.0   12.75 )-----------( 150      ( 08 Nov 2019 05:45 )             36.2     11-08    144  |  105  |  17  ----------------------------<  130<H>  3.0<L>   |  30  |  0.55    Ca    8.7      08 Nov 2019 05:45  Phos  3.2     11-08  Mg     1.9     11-08    TPro  6.3  /  Alb  2.8<L>  /  TBili  0.4  /  DBili  x   /  AST  15  /  ALT  15  /  AlkPhos  71  11-07    Lactate:                    IMAGING:  EXAM:  CT ABDOMEN AND PELVIS IC      PROCEDURE DATE:  Nov 8 2019     INTERPRETATION:  CLINICAL INFORMATION: Hepatic mass incidentally   identified on a CTA chest.    COMPARISON: 11/7/2019 CTA chest and ultrasound 2/23/2018.    PROCEDURE:   CT of the Abdomen and Pelvis was performed with intravenous contrast.   Arterial, Portal Venous and Delayed phases were acquired.  Intravenous contrast: 90 ml Omnipaque 350. 10 ml discarded.  Oral contrast: None.  Sagittal and coronal reformats were performed.    FINDINGS:    LOWER CHEST: Marked cardiomegaly. Small bilateral pleural effusions with   adjacent atelectasis.    LIVER: Multiple cysts.  BILE DUCTS: Normal caliber.  GALLBLADDER: The gallbladder is markedly distended and demonstrates mural  enhancement. There is a small rim-enhancing collection tracking from the   gallbladder inferiorly. There is suggestion of irregularity in the   gallbladder wall.  SPLEEN: Within normal limits.  PANCREAS: Within normal limits.  ADRENALS: Bilateral indeterminate adrenal nodules.  KIDNEYS/URETERS: Within normal limits.    Markedly suboptimal evaluation of the pelvic structures due to streak   artifacts from bilateral hip arthroplasties.  BLADDER: Not well evaluated.  REPRODUCTIVE ORGANS: Calcifiedand noncalcified fibroids.    BOWEL: No bowel obstruction. Appendix is normal. Moderate amount of   stool. Scattered colonic diverticulosis without acute diverticulitis.  PERITONEUM: No ascites.  VESSELS: Atherosclerotic changes.  RETROPERITONEUM/LYMPH NODES: No lymphadenopathy.    ABDOMINAL WALL: Within normal limits.  BONES: Bilateral total hip arthroplasties.    IMPRESSION:     Perforated cholecystitis with an associated small rim-enhancing fluid   collection tracking inferiorly.              EXAM:  CT ANGIO CHEST (W)AW IC      PROCEDURE DATE:  Nov 7 2019     INTERPRETATION:  CLINICAL INFORMATION: Tachycardia, Pneumonia    COMPARISON: None.    PROCEDURE: CT Angiography of the Chest. 90 ml of Omnipaque 350 was   injected intravenously. 10 ml were discarded. Sagittal and coronal   reformats were performed as well as 3D (MIP) reconstructions.    FINDINGS:    LUNGS AND AIRWAYS: Tracheobronchial secretions with extensive   opacification right lower lobe bronchus. There is associated complete   volume loss of the right lower lobe and partial left lower lobe   atelectasis.    PLEURA: Bilateral pleural effusion, right greater than left.    MEDIASTINUM AND RACHAEL: No lymphadenopathy.    VESSELS: Limited evaluation for segmental and subsegmental pulmonary   embolism. No main, left, right, or lobar pulmonary embolism. . Aortic   atherosclerosis     HEART: Cardiomegaly. No pericardial effusion. Coronary artery   atherosclerosis.    CHEST WALL AND LOWER NECK: No lymphadenopathy. Heterogeneous thyroid.    VISUALIZED UPPER ABDOMEN: Partially imaged 8 cm subhepatic mass. Numerous   hepatic cysts. Unremarkable kidneys, spleen and pancreas.    BONES: Degenerative changes of the thoracic spine.    IMPRESSION:     Limited evaluation for segmental and subsegmental pulmonary embolism. No   main, left, right, or lobar pulmonary embolism.    Extensive right lower lobe secretions with complete atelectasis right   lower lobe. Partial atelectasis left lower lobe. Lungs otherwise clear.    Large subhepatic mass measuring at least 8 cm. CT abdomen/pelvis   recommended for complete evaluation General Surgery Consult      Consulting surgical team: B-Team  Consulting attending: DOREEN Rai      HPI:  89 yo F with PMH of dementia (AAOx2 at baseline) presenting with weakness and SOB x 1 day. Patient has 24/7 HHA who noticed that patient had SOB earlier today, appeared uncomfortable. She felt intermittently hot and cold, but no notable rigors. Patient able to ambulate with walker at baseline, uses wheelchair when outdoors. No noted fevers or chills. Has intermittent urine and fecal incontinence, uses diapers. No noted diarrhea or constipation, has BM every 2-3 days, last one was Tuesday or Wednesday. Denies any changes in BM or urine output. No noted orthopnea but per aide, they never lay patient flat on her back. Noted that patient has had a cough for about 5 weeks that worsened yesterday, productive of whitish sputum. Patient does not have a known history of afib per family, nor a history of asthma/COPD or other lung problems. Aide has noticed that patient coughs when she eats or drinks too quickly.    In ED vitals: T 98.6 (Tm 102.5 rectally), , /98, RR 22, O2 96% on 2L.   Labs notable for leukocytosis of 21.97, elevated pro-BNP 88430, and elevated lacate 3.4->2.7.  Given tylenol 1g, zosyn, vanc, 500cc NS x2. (01 Nov 2019 17:28)      Interval:  Patient with incidental hepatic mass found on CT chest on 11/7. On CT on 11/8 to further evaluate mass was found to have perforated cholecystitis. Surgery was consulted for recommendation and management. Patient with WBC 12.7 and afebrile. Denies abd discomfort, N/V, CP, SOB, dysuria, diarrhea, or constipation.         PAST MEDICAL HISTORY:  Dementia  No significant past medical history        PAST SURGICAL HISTORY:  History of hip surgery        MEDICATIONS:        ALLERGIES:  No Known Allergies        VITALS & I/Os:  Vital Signs Last 24 Hrs  T(C): 36.3 (08 Nov 2019 14:56), Max: 36.7 (07 Nov 2019 21:10)  T(F): 97.3 (08 Nov 2019 14:56), Max: 98.1 (07 Nov 2019 21:10)  HR: 88 (08 Nov 2019 14:56) (64 - 105)  BP: 134/74 (08 Nov 2019 14:56) (106/67 - 134/74)  BP(mean): --  RR: 17 (08 Nov 2019 14:56) (17 - 18)  SpO2: 97% (08 Nov 2019 14:56) (94% - 98%)    I&O's Summary    07 Nov 2019 07:01  -  08 Nov 2019 07:00  --------------------------------------------------------  IN: 300 mL / OUT: 350 mL / NET: -50 mL          PHYSICAL EXAM:  General: No acute distress  Respiratory: Nonlabored  Cardiovascular: normotensive, regular rate  Groin: primafit in place  Abdominal: Soft, nondistended, nontender        LABS:                        11.0   12.75 )-----------( 150      ( 08 Nov 2019 05:45 )             36.2     11-08    144  |  105  |  17  ----------------------------<  130<H>  3.0<L>   |  30  |  0.55    Ca    8.7      08 Nov 2019 05:45  Phos  3.2     11-08  Mg     1.9     11-08    TPro  6.3  /  Alb  2.8<L>  /  TBili  0.4  /  DBili  x   /  AST  15  /  ALT  15  /  AlkPhos  71  11-07    Lactate:                    IMAGING:  EXAM:  CT ABDOMEN AND PELVIS IC      PROCEDURE DATE:  Nov 8 2019     INTERPRETATION:  CLINICAL INFORMATION: Hepatic mass incidentally   identified on a CTA chest.    COMPARISON: 11/7/2019 CTA chest and ultrasound 2/23/2018.    PROCEDURE:   CT of the Abdomen and Pelvis was performed with intravenous contrast.   Arterial, Portal Venous and Delayed phases were acquired.  Intravenous contrast: 90 ml Omnipaque 350. 10 ml discarded.  Oral contrast: None.  Sagittal and coronal reformats were performed.    FINDINGS:    LOWER CHEST: Marked cardiomegaly. Small bilateral pleural effusions with   adjacent atelectasis.    LIVER: Multiple cysts.  BILE DUCTS: Normal caliber.  GALLBLADDER: The gallbladder is markedly distended and demonstrates mural  enhancement. There is a small rim-enhancing collection tracking from the   gallbladder inferiorly. There is suggestion of irregularity in the   gallbladder wall.  SPLEEN: Within normal limits.  PANCREAS: Within normal limits.  ADRENALS: Bilateral indeterminate adrenal nodules.  KIDNEYS/URETERS: Within normal limits.    Markedly suboptimal evaluation of the pelvic structures due to streak   artifacts from bilateral hip arthroplasties.  BLADDER: Not well evaluated.  REPRODUCTIVE ORGANS: Calcifiedand noncalcified fibroids.    BOWEL: No bowel obstruction. Appendix is normal. Moderate amount of   stool. Scattered colonic diverticulosis without acute diverticulitis.  PERITONEUM: No ascites.  VESSELS: Atherosclerotic changes.  RETROPERITONEUM/LYMPH NODES: No lymphadenopathy.    ABDOMINAL WALL: Within normal limits.  BONES: Bilateral total hip arthroplasties.    IMPRESSION:     Perforated cholecystitis with an associated small rim-enhancing fluid   collection tracking inferiorly.              EXAM:  CT ANGIO CHEST (W)AW IC      PROCEDURE DATE:  Nov 7 2019     INTERPRETATION:  CLINICAL INFORMATION: Tachycardia, Pneumonia    COMPARISON: None.    PROCEDURE: CT Angiography of the Chest. 90 ml of Omnipaque 350 was   injected intravenously. 10 ml were discarded. Sagittal and coronal   reformats were performed as well as 3D (MIP) reconstructions.    FINDINGS:    LUNGS AND AIRWAYS: Tracheobronchial secretions with extensive   opacification right lower lobe bronchus. There is associated complete   volume loss of the right lower lobe and partial left lower lobe   atelectasis.    PLEURA: Bilateral pleural effusion, right greater than left.    MEDIASTINUM AND RACHAEL: No lymphadenopathy.    VESSELS: Limited evaluation for segmental and subsegmental pulmonary   embolism. No main, left, right, or lobar pulmonary embolism. . Aortic   atherosclerosis     HEART: Cardiomegaly. No pericardial effusion. Coronary artery   atherosclerosis.    CHEST WALL AND LOWER NECK: No lymphadenopathy. Heterogeneous thyroid.    VISUALIZED UPPER ABDOMEN: Partially imaged 8 cm subhepatic mass. Numerous   hepatic cysts. Unremarkable kidneys, spleen and pancreas.    BONES: Degenerative changes of the thoracic spine.    IMPRESSION:     Limited evaluation for segmental and subsegmental pulmonary embolism. No   main, left, right, or lobar pulmonary embolism.    Extensive right lower lobe secretions with complete atelectasis right   lower lobe. Partial atelectasis left lower lobe. Lungs otherwise clear.    Large subhepatic mass measuring at least 8 cm. CT abdomen/pelvis   recommended for complete evaluation

## 2019-11-08 NOTE — PROGRESS NOTE ADULT - SUBJECTIVE AND OBJECTIVE BOX
INTERVAL EVENTS  No acute events overnight      OBJECTIVE    Vital Signs Last 24 Hrs  T(C): 36.2 (08 Nov 2019 05:27), Max: 36.7 (07 Nov 2019 15:21)  T(F): 97.2 (08 Nov 2019 05:27), Max: 98.1 (07 Nov 2019 21:10)  HR: 95 (08 Nov 2019 08:47) (90 - 107)  BP: 114/69 (08 Nov 2019 05:27) (106/67 - 127/75)  BP(mean): --  RR: 17 (08 Nov 2019 05:27) (17 - 18)  SpO2: 98% (08 Nov 2019 08:47) (94% - 98%)    PHYSICAL EXAM:  General: no acute distress, resting comfortable with nasal cannula  HEENT: normocephalic  Eyes: pupils equal and reactive to light  Neck: supple  Respiratory: non labored breathing, decreased breath sounds in right base  Cardiovascular: normal rate, irregular rhythm  Gastrointestinal: abdomen soft, non tender, non distended  Extremities: no lower extremity edema  Neurological: alert, answering questions   Psychiatric: cooperative      MEDICATIONS  (STANDING):  albuterol/ipratropium for Nebulization 3 milliLiter(s) Nebulizer every 6 hours  enoxaparin Injectable 30 milliGRAM(s) SubCutaneous daily  guaiFENesin    Syrup 100 milliGRAM(s) Oral every 6 hours  influenza   Vaccine 0.5 milliLiter(s) IntraMuscular once  piperacillin/tazobactam IVPB.. 3.375 Gram(s) IV Intermittent every 8 hours  polyethylene glycol 3350 17 Gram(s) Oral daily  potassium chloride   Powder 40 milliEquivalent(s) Oral once  potassium chloride  10 mEq/100 mL IVPB 10 milliEquivalent(s) IV Intermittent every 1 hour  senna 2 Tablet(s) Oral at bedtime  sodium chloride 3%  Inhalation 3 milliLiter(s) Inhalation three times a day  sorbitol 70%/mineral oil/magnesium hydroxide/glycerin Enema 120 milliLiter(s) Rectal once    MEDICATIONS  (PRN):  acetaminophen   Tablet .. 650 milliGRAM(s) Oral every 6 hours PRN Temp greater or equal to 38C (100.4F), Mild Pain (1 - 3), Moderate Pain (4 - 6), Severe Pain (7 - 10)    LABORATORY                          11.0   12.75 )-----------( 150      ( 08 Nov 2019 05:45 )             36.2     11-08    144  |  105  |  17  ----------------------------<  130<H>  3.0<L>   |  30  |  0.55    Ca    8.7      08 Nov 2019 05:45  Phos  3.2     11-08  Mg     1.9     11-08    TPro  6.3  /  Alb  2.8<L>  /  TBili  0.4  /  DBili  x   /  AST  15  /  ALT  15  /  AlkPhos  71  11-07    RADIOLOGY    CT Angio Chest w/ IV Cont (11.07.19 @ 13:14) >  Limited evaluation for segmental and subsegmental pulmonary embolism. No main, left, right, or lobar pulmonary embolism.  Extensive right lower lobe secretions with complete atelectasis right lower lobe. Partial atelectasis left lower lobe. Lungs otherwise clear.  Large subhepatic mass measuring at least 8 cm. CT abdomen/pelvis recommended for complete evaluation    < end of copied text > INTERVAL EVENTS  No acute events overnight  Appears comfortable on 2 L of nasal cannula    OBJECTIVE    Vital Signs Last 24 Hrs  T(C): 36.2 (08 Nov 2019 05:27), Max: 36.7 (07 Nov 2019 15:21)  T(F): 97.2 (08 Nov 2019 05:27), Max: 98.1 (07 Nov 2019 21:10)  HR: 95 (08 Nov 2019 08:47) (90 - 107)  BP: 114/69 (08 Nov 2019 05:27) (106/67 - 127/75)  BP(mean): --  RR: 17 (08 Nov 2019 05:27) (17 - 18)  SpO2: 98% (08 Nov 2019 08:47) (94% - 98%)    PHYSICAL EXAM:  General: no acute distress, resting comfortable with nasal cannula  HEENT: normocephalic  Eyes: pupils equal and reactive to light  Neck: supple  Respiratory: non labored breathing, decreased breath sounds in right base  Cardiovascular: normal rate, irregular rhythm  Gastrointestinal: abdomen soft, non tender, non distended  Extremities: no lower extremity edema  Neurological: alert, answering questions   Psychiatric: cooperative      MEDICATIONS  (STANDING):  albuterol/ipratropium for Nebulization 3 milliLiter(s) Nebulizer every 6 hours  enoxaparin Injectable 30 milliGRAM(s) SubCutaneous daily  guaiFENesin    Syrup 100 milliGRAM(s) Oral every 6 hours  influenza   Vaccine 0.5 milliLiter(s) IntraMuscular once  piperacillin/tazobactam IVPB.. 3.375 Gram(s) IV Intermittent every 8 hours  polyethylene glycol 3350 17 Gram(s) Oral daily  potassium chloride   Powder 40 milliEquivalent(s) Oral once  potassium chloride  10 mEq/100 mL IVPB 10 milliEquivalent(s) IV Intermittent every 1 hour  senna 2 Tablet(s) Oral at bedtime  sodium chloride 3%  Inhalation 3 milliLiter(s) Inhalation three times a day  sorbitol 70%/mineral oil/magnesium hydroxide/glycerin Enema 120 milliLiter(s) Rectal once    MEDICATIONS  (PRN):  acetaminophen   Tablet .. 650 milliGRAM(s) Oral every 6 hours PRN Temp greater or equal to 38C (100.4F), Mild Pain (1 - 3), Moderate Pain (4 - 6), Severe Pain (7 - 10)    LABORATORY                          11.0   12.75 )-----------( 150      ( 08 Nov 2019 05:45 )             36.2     11-08    144  |  105  |  17  ----------------------------<  130<H>  3.0<L>   |  30  |  0.55    Ca    8.7      08 Nov 2019 05:45  Phos  3.2     11-08  Mg     1.9     11-08    TPro  6.3  /  Alb  2.8<L>  /  TBili  0.4  /  DBili  x   /  AST  15  /  ALT  15  /  AlkPhos  71  11-07    RADIOLOGY    CT Angio Chest w/ IV Cont (11.07.19 @ 13:14) >  Limited evaluation for segmental and subsegmental pulmonary embolism. No main, left, right, or lobar pulmonary embolism.  Extensive right lower lobe secretions with complete atelectasis right lower lobe. Partial atelectasis left lower lobe. Lungs otherwise clear.  Large subhepatic mass measuring at least 8 cm. CT abdomen/pelvis recommended for complete evaluation    < end of copied text >

## 2019-11-08 NOTE — CHART NOTE - NSCHARTNOTEFT_GEN_A_CORE
Called to evaluate new skin tear. Pt does not recall how she injured her R arm, but most likely she hit the edge of the bed during sleep. It was painful at first, but no longer bothers her. On exam, pt has a semi-circular 5cm skin tear on the dorsal aspect of her right forearm. The skin tear is now lightly dressed.

## 2019-11-08 NOTE — DIETITIAN INITIAL EVALUATION ADULT. - PERTINENT MEDS FT
acetaminophen   Tablet .. PRN  albuterol/ipratropium for Nebulization  enoxaparin Injectable  guaiFENesin    Syrup  influenza   Vaccine  polyethylene glycol 3350  senna  sodium chloride 3%  Inhalation

## 2019-11-08 NOTE — PROGRESS NOTE ADULT - PROBLEM SELECTOR PLAN 6
CHADsVASc 3. Afib likely driven by sepsis.   - will monitor off AC for now; h/o GIB in past   - soft BPs, will hold off on rate control, HR currently in the 90s. systolic murmur radiating to carotids noted on exam  - ctm volume status in the setting of fluid resuscitation for hyponatremia CHADsVASc 3. Afib likely driven by sepsis.   - ctm off AC for now; h/o GIB in past   - soft BPs, will hold off on rate control, HR currently stable in the 90s.

## 2019-11-08 NOTE — DIETITIAN INITIAL EVALUATION ADULT. - PERTINENT LABORATORY DATA
11-08 Na 144 mmol/L Glu 130 mg/dL<H> K+ 3.0 mmol/L<L> Cr 0.55 mg/dL BUN 17 mg/dL Phos 3.2 mg/dL Alb n/a   PAB n/a   Hgb 11.0 g/dL<L> Hct 36.2 % HgA1C n/a    Glucose, Serum: 130 mg/dL <H>  Glucose, Serum: 136 mg/dL <H>

## 2019-11-08 NOTE — DIETITIAN INITIAL EVALUATION ADULT. - OTHER INFO
Per chart review patient with medical history of dementia (AAOx2 at baseline) presenting with weakness and SOB x 1 day. Patient off of floor for CT scan, spoke to patient's HHA and patient's Son at bedside. Son reports patient was eating well prior to admission. HHA states patient had a good appetite and would eat regular textured food but would have episodes of coughing when eating. NKFA. S/p swallow assessment in-house on 11/03 with recommendation that oral nutrition is contraindicated. Per chart review, Son wants pleasure feeds for patient. Patient receiving pureed diet with Honey Consistency fluids. HHA reports patient liked to drink ensure supplement at home. Son agreeable to Ensure supplementation in-house and understands supplement needs to be thickened to Honey Consistency. No significant weight change reported. UBW ~120# and current admit weight ~117#. Patient on bowel regimen medications to alleviate constipation. No nausea/vomiting reported.

## 2019-11-08 NOTE — CONSULT NOTE ADULT - ATTENDING COMMENTS
Agree with above. Seen with fellow on rounds. No need for urgent thoracentesis and risks outweigh benefits. Conservative management.
PT seen and examined.  Agree with resident eval and plan.  IR for drainage.  Supportive measures.

## 2019-11-08 NOTE — DIETITIAN INITIAL EVALUATION ADULT. - ENERGY NEEDS
Height (cm): 162.6  Weight (kg): 53.206 (01 Nov 2019, admit)  BMI (kg/m2): 20.1   IBW: 54.5kg +/-10%

## 2019-11-08 NOTE — PROGRESS NOTE ADULT - PROBLEM SELECTOR PLAN 2
Sodium slowly uptrending. Patient euvolemic and s/p trial of lasix for hypoxia.  -D5 50cc/hr for 10 hours  -Repeat BMP tonight at 10PM to further assess Sodium slowly uptrending. Patient euvolemic and s/p trial of lasix for hypoxia.  -s/p D5 50cc/hr for 10 hours  - repeat BMP this evening Sodium slowly uptrending. Patient euvolemic and s/p trial of lasix for hypoxia.  -Resolved this morning  - f/u daily BMPs and give D5 as necessary

## 2019-11-08 NOTE — DIETITIAN INITIAL EVALUATION ADULT. - PHYSICAL APPEARANCE
other (specify)/Patient off of unit for CT scan, will attempt nutrition focused physical exam on follow-up as appropriate. No pressure ulcers/DTI noted in flowsheets.   No edema noted.

## 2019-11-08 NOTE — DIETITIAN INITIAL EVALUATION ADULT. - RD TO REMAIN AVAILABLE
1. Continue pleasure feeds of Dysphagia 1 Pureed - Honey Consistency fluid diet. 2. Recommend adding Ensure Enlive 240mls 1x daily (350 kcal, 20g protein). 3. Consider discussion to determine long term nutrition goals. 4. Assistance with meals, maintain strict aspiration precautions.

## 2019-11-08 NOTE — PROGRESS NOTE ADULT - PROBLEM SELECTOR PLAN 7
systolic murmur radiating to carotids noted on exam  - ctm volume status in the setting of fluid resuscitation for hyponatremia DVT ppx: lovenox daily  Diet: pleasure feeds  Dispo: likely home  Transitions of Care Status:  1.  Name of PCP:   2.  PCP Contacted on Admission: [ ] Y    [ x] N    3.  PCP contacted at Discharge: [ ] Y    [ ] N    [ ] N/A  4.  Post-Discharge Appointment Date and Location:  5.  Summary of Handoff given to PCP: DVT ppx: lovenox daily  Diet: pleasure feeds  Dispo: Plan is for home with home PT  Transitions of Care Status:  1.  Name of PCP:   2.  PCP Contacted on Admission: [ ] Y    [ x] N    3.  PCP contacted at Discharge: [ ] Y    [ ] N    [ ] N/A  4.  Post-Discharge Appointment Date and Location:  5.  Summary of Handoff given to PCP:

## 2019-11-08 NOTE — CONSULT NOTE ADULT - ASSESSMENT
89 yo F with PMH of dementia (AAOx2 at baseline) presented 11/1 with weakness and SOB x 1 day. Incidental hepatic mass found on CT chest on 11/7. On CT on 11/8 to further evaluate mass was found to have perforated cholecystitis.     Plan      - NPO      - Zosyn antibiotics      - IV fluid      - Recommend IR for perc vidhya  - No acute surgical intervention  - Discussed with Dr. Topete (fellow)

## 2019-11-08 NOTE — CHART NOTE - NSCHARTNOTEFT_GEN_A_CORE
Discussion with family at bedside regarding incidental discovery of inflammed and likely perforated gallbladder. Surgery was consulted and preliminary recommended NPO, IV fluids, Zosyn, and suggestion for an IR percutaneous cholecystostomy. We discussed these options with the son He  decided, based on the risks and benefits of the procedure and in light of minimal abdominal symptoms and tolerance of PO, to continue with feeds and wait until IR gave their recommendations about the procedure.

## 2019-11-08 NOTE — PROGRESS NOTE ADULT - PROBLEM SELECTOR PLAN 5
- c/w zosyn (11/1- 11/6)  - Pleasure feeds as per family discussion  - c/w asp precautions CHADsVASc 3. Afib likely driven by sepsis.   - will monitor off AC for now; h/o GIB in past   - soft BPs, will hold off on rate control, HR currently in the 90s. CHADsVASc 3. Afib likely driven by sepsis.   - ctm off AC for now; h/o GIB in past   - soft BPs, will hold off on rate control, HR currently stable in the 90s. - c/w 3.375g zosyn (11/1- 11/8)  - Pleasure feeds as per family discussion  - c/w asp precautions

## 2019-11-08 NOTE — PROGRESS NOTE ADULT - PROBLEM SELECTOR PLAN 4
Resolved   c/w zosyn (11/1-11/8) - c/w zosyn (11/1- 11/8)  - Pleasure feeds as per family discussion  - c/w asp precautions - c/w 3.375g zosyn (11/1- 11/8)  - Pleasure feeds as per family discussion  - c/w asp precautions Presented febrile, tachy, elevated WBC. UA positive. CXR with possible L effusion.   - sepsis likely 2/2 aspiration PNA given the h/o of coughing with PO intake (and gurgling)  - s/p 3.375 zosyn (11/1-11/8)

## 2019-11-09 DIAGNOSIS — K82.2 PERFORATION OF GALLBLADDER: ICD-10-CM

## 2019-11-09 LAB
ANION GAP SERPL CALC-SCNC: 11 MMO/L — SIGNIFICANT CHANGE UP (ref 7–14)
BASOPHILS # BLD AUTO: 0.03 K/UL — SIGNIFICANT CHANGE UP (ref 0–0.2)
BASOPHILS NFR BLD AUTO: 0.2 % — SIGNIFICANT CHANGE UP (ref 0–2)
BUN SERPL-MCNC: 17 MG/DL — SIGNIFICANT CHANGE UP (ref 7–23)
CALCIUM SERPL-MCNC: 9.1 MG/DL — SIGNIFICANT CHANGE UP (ref 8.4–10.5)
CHLORIDE SERPL-SCNC: 108 MMOL/L — HIGH (ref 98–107)
CO2 SERPL-SCNC: 27 MMOL/L — SIGNIFICANT CHANGE UP (ref 22–31)
CREAT SERPL-MCNC: 0.61 MG/DL — SIGNIFICANT CHANGE UP (ref 0.5–1.3)
EOSINOPHIL # BLD AUTO: 0.05 K/UL — SIGNIFICANT CHANGE UP (ref 0–0.5)
EOSINOPHIL NFR BLD AUTO: 0.4 % — SIGNIFICANT CHANGE UP (ref 0–6)
GLUCOSE SERPL-MCNC: 104 MG/DL — HIGH (ref 70–99)
HCT VFR BLD CALC: 36.1 % — SIGNIFICANT CHANGE UP (ref 34.5–45)
HGB BLD-MCNC: 10.8 G/DL — LOW (ref 11.5–15.5)
IMM GRANULOCYTES NFR BLD AUTO: 1 % — SIGNIFICANT CHANGE UP (ref 0–1.5)
LYMPHOCYTES # BLD AUTO: 1.15 K/UL — SIGNIFICANT CHANGE UP (ref 1–3.3)
LYMPHOCYTES # BLD AUTO: 8.7 % — LOW (ref 13–44)
MAGNESIUM SERPL-MCNC: 2.1 MG/DL — SIGNIFICANT CHANGE UP (ref 1.6–2.6)
MCHC RBC-ENTMCNC: 29.3 PG — SIGNIFICANT CHANGE UP (ref 27–34)
MCHC RBC-ENTMCNC: 29.9 % — LOW (ref 32–36)
MCV RBC AUTO: 97.8 FL — SIGNIFICANT CHANGE UP (ref 80–100)
MONOCYTES # BLD AUTO: 0.48 K/UL — SIGNIFICANT CHANGE UP (ref 0–0.9)
MONOCYTES NFR BLD AUTO: 3.7 % — SIGNIFICANT CHANGE UP (ref 2–14)
NEUTROPHILS # BLD AUTO: 11.31 K/UL — HIGH (ref 1.8–7.4)
NEUTROPHILS NFR BLD AUTO: 86 % — HIGH (ref 43–77)
NRBC # FLD: 0 K/UL — SIGNIFICANT CHANGE UP (ref 0–0)
PHOSPHATE SERPL-MCNC: 3.2 MG/DL — SIGNIFICANT CHANGE UP (ref 2.5–4.5)
PLATELET # BLD AUTO: 173 K/UL — SIGNIFICANT CHANGE UP (ref 150–400)
PMV BLD: 11.8 FL — SIGNIFICANT CHANGE UP (ref 7–13)
POTASSIUM SERPL-MCNC: 3.8 MMOL/L — SIGNIFICANT CHANGE UP (ref 3.5–5.3)
POTASSIUM SERPL-SCNC: 3.8 MMOL/L — SIGNIFICANT CHANGE UP (ref 3.5–5.3)
RBC # BLD: 3.69 M/UL — LOW (ref 3.8–5.2)
RBC # FLD: 14.6 % — HIGH (ref 10.3–14.5)
SODIUM SERPL-SCNC: 146 MMOL/L — HIGH (ref 135–145)
WBC # BLD: 13.15 K/UL — HIGH (ref 3.8–10.5)
WBC # FLD AUTO: 13.15 K/UL — HIGH (ref 3.8–10.5)

## 2019-11-09 PROCEDURE — 99223 1ST HOSP IP/OBS HIGH 75: CPT

## 2019-11-09 PROCEDURE — 99233 SBSQ HOSP IP/OBS HIGH 50: CPT | Mod: GC

## 2019-11-09 RX ADMIN — SODIUM CHLORIDE 3 MILLILITER(S): 9 INJECTION INTRAMUSCULAR; INTRAVENOUS; SUBCUTANEOUS at 16:43

## 2019-11-09 RX ADMIN — PIPERACILLIN AND TAZOBACTAM 25 GRAM(S): 4; .5 INJECTION, POWDER, LYOPHILIZED, FOR SOLUTION INTRAVENOUS at 23:22

## 2019-11-09 RX ADMIN — SENNA PLUS 2 TABLET(S): 8.6 TABLET ORAL at 23:21

## 2019-11-09 RX ADMIN — Medication 3 MILLILITER(S): at 05:40

## 2019-11-09 RX ADMIN — Medication 3 MILLILITER(S): at 16:43

## 2019-11-09 RX ADMIN — PIPERACILLIN AND TAZOBACTAM 25 GRAM(S): 4; .5 INJECTION, POWDER, LYOPHILIZED, FOR SOLUTION INTRAVENOUS at 13:22

## 2019-11-09 RX ADMIN — POLYETHYLENE GLYCOL 3350 17 GRAM(S): 17 POWDER, FOR SOLUTION ORAL at 11:43

## 2019-11-09 RX ADMIN — ENOXAPARIN SODIUM 30 MILLIGRAM(S): 100 INJECTION SUBCUTANEOUS at 11:43

## 2019-11-09 RX ADMIN — Medication 100 MILLIGRAM(S): at 00:15

## 2019-11-09 RX ADMIN — Medication 3 MILLILITER(S): at 10:50

## 2019-11-09 RX ADMIN — PIPERACILLIN AND TAZOBACTAM 25 GRAM(S): 4; .5 INJECTION, POWDER, LYOPHILIZED, FOR SOLUTION INTRAVENOUS at 05:34

## 2019-11-09 RX ADMIN — Medication 3 MILLILITER(S): at 21:37

## 2019-11-09 RX ADMIN — Medication 100 MILLIGRAM(S): at 05:35

## 2019-11-09 RX ADMIN — Medication 100 MILLIGRAM(S): at 11:43

## 2019-11-09 RX ADMIN — SODIUM CHLORIDE 3 MILLILITER(S): 9 INJECTION INTRAMUSCULAR; INTRAVENOUS; SUBCUTANEOUS at 21:38

## 2019-11-09 RX ADMIN — Medication 100 MILLIGRAM(S): at 17:52

## 2019-11-09 RX ADMIN — SODIUM CHLORIDE 3 MILLILITER(S): 9 INJECTION INTRAMUSCULAR; INTRAVENOUS; SUBCUTANEOUS at 10:50

## 2019-11-09 NOTE — PROGRESS NOTE ADULT - PROBLEM SELECTOR PLAN 2
Incidental liver mass found on CT chest to evaluate hypoxia. CT AP (11/8) showed enlarged gallbladder with mural wall thickening, evidence of perforation.  -Surgery recs appreciated  -Continue with Zosyn 3.375g q8h /(11/1-)  -Continue with pleasure feeds as per discussion with son  -LARON brewer on Monday

## 2019-11-09 NOTE — PROGRESS NOTE ADULT - PROBLEM SELECTOR PLAN 7
DVT ppx: lovenox daily  Diet: pleasure feeds  Dispo: Plan is for home with home PT  Transitions of Care Status:  1.  Name of PCP:   2.  PCP Contacted on Admission: [ ] Y    [ x] N    3.  PCP contacted at Discharge: [ ] Y    [ ] N    [ ] N/A  4.  Post-Discharge Appointment Date and Location:  5.  Summary of Handoff given to PCP:

## 2019-11-09 NOTE — PROGRESS NOTE ADULT - PROBLEM SELECTOR PLAN 1
R pleural effusion > L pleural effusion with RLL total lung collapse seen on CTA. No PE. Thoracentesis held off due to high risk.  - c/w aspiration precautions  - Will coordinate outpatient oxygen R pleural effusion > L pleural effusion with RLL total lung collapse seen on CTA. No PE. Thoracentesis held off due to high risk.  - c/w aspiration precautions  - Will coordinate outpatient oxygen requirements

## 2019-11-09 NOTE — PROGRESS NOTE ADULT - SUBJECTIVE AND OBJECTIVE BOX
PROGRESS NOTE:   Authoted by Dr. Jose Cruz Callejas MD, Pager [phone] [facility], [address] [facility]     Patient is a 89y old  Female who presents with a chief complaint of SOB (09 Nov 2019 06:25)      SUBJECTIVE / OVERNIGHT EVENTS: Overnight patient developed a cut on her arm, likely hit it while sleeping. Wound care assessed and bandaged. Patient seen and examined at bedside. Not complaining of pain this morning on the wrist. No abdominal pain. No cp/sob.    ADDITIONAL REVIEW OF SYSTEMS: No n/v/f/c.    MEDICATIONS  (STANDING):  albuterol/ipratropium for Nebulization 3 milliLiter(s) Nebulizer every 6 hours  enoxaparin Injectable 30 milliGRAM(s) SubCutaneous daily  guaiFENesin    Syrup 100 milliGRAM(s) Oral every 6 hours  influenza   Vaccine 0.5 milliLiter(s) IntraMuscular once  piperacillin/tazobactam IVPB.. 3.375 Gram(s) IV Intermittent every 8 hours  polyethylene glycol 3350 17 Gram(s) Oral daily  senna 2 Tablet(s) Oral at bedtime  sodium chloride 3%  Inhalation 3 milliLiter(s) Inhalation three times a day    MEDICATIONS  (PRN):  acetaminophen   Tablet .. 650 milliGRAM(s) Oral every 6 hours PRN Temp greater or equal to 38C (100.4F), Mild Pain (1 - 3), Moderate Pain (4 - 6), Severe Pain (7 - 10)      CAPILLARY BLOOD GLUCOSE        I&O's Summary    08 Nov 2019 07:01  -  09 Nov 2019 07:00  --------------------------------------------------------  IN: 0 mL / OUT: 350 mL / NET: -350 mL        PHYSICAL EXAM:  Vital Signs Last 24 Hrs  T(C): 36.9 (09 Nov 2019 05:33), Max: 36.9 (09 Nov 2019 05:33)  T(F): 98.5 (09 Nov 2019 05:33), Max: 98.5 (09 Nov 2019 05:33)  HR: 62 (09 Nov 2019 05:41) (60 - 102)  BP: 114/87 (09 Nov 2019 05:33) (106/79 - 134/74)  BP(mean): --  RR: 17 (09 Nov 2019 05:33) (17 - 19)  SpO2: 97% (09 Nov 2019 05:41) (88% - 97%)    CONSTITUTIONAL: NAD, well-developed  RESPIRATORY: Normal respiratory effort with good air entry. Crackles in L lung from base to mid-lung posteriorly. R lung decreased breath sounds at lung base.  CARDIOVASCULAR: Irregularly irregular rhythm, normal S1 and S2, with 2/6 systolic murmur radiating to the carotids; No lower extremity edema.  ABDOMEN: Nontender to palpation, normoactive bowel sounds, no rebound/guarding; No hepatosplenomegaly  MUSCLOSKELETAL: no clubbing or cyanosis of digits; no joint swelling or tenderness to palpation. R-sided wrist wound, bandaged.  PSYCH: AAOx1 to person; affect appropriate        LABS:                        10.8   13.15 )-----------( 173      ( 09 Nov 2019 05:15 )             36.1     11-09    146<H>  |  108<H>  |  17  ----------------------------<  104<H>  3.8   |  27  |  0.61    Ca    9.1      09 Nov 2019 05:15  Phos  3.2     11-09  Mg     2.1     11-09                  RADIOLOGY & ADDITIONAL TESTS:  Results Reviewed:   Imaging Personally Reviewed:    COORDINATION OF CARE:  Care Discussed with Consultants/Other Providers [Y/N]:  Prior or Outpatient Records Reviewed [Y/N]:

## 2019-11-09 NOTE — PROGRESS NOTE ADULT - PROBLEM SELECTOR PLAN 3
Sodium had been slowly uptrending. Patient euvolemic and s/p trial of lasix for hypoxia.  - f/u daily BMPs and give D5 as necessary

## 2019-11-09 NOTE — PROGRESS NOTE ADULT - PROBLEM SELECTOR PLAN 5
CHADsVASc 3. Afib likely driven by sepsis.   - ctm off AC for now; h/o GIB in past   - soft BPs, will hold off on rate control, HR currently stable in the 90s.

## 2019-11-09 NOTE — PROGRESS NOTE ADULT - PROBLEM SELECTOR PLAN 4
Resolved. Presented febrile, tachy, elevated WBC. UA positive. CXR with possible L effusion.   - sepsis likely 2/2 aspiration PNA given the h/o of coughing with PO intake (and gurgling)  - c/w Zosyn 3.375g q8h (11/1-) for gallbladder perforation

## 2019-11-10 DIAGNOSIS — Z02.9 ENCOUNTER FOR ADMINISTRATIVE EXAMINATIONS, UNSPECIFIED: ICD-10-CM

## 2019-11-10 DIAGNOSIS — E87.0 HYPEROSMOLALITY AND HYPERNATREMIA: ICD-10-CM

## 2019-11-10 LAB
ANION GAP SERPL CALC-SCNC: 11 MMO/L — SIGNIFICANT CHANGE UP (ref 7–14)
BASOPHILS # BLD AUTO: 0.02 K/UL — SIGNIFICANT CHANGE UP (ref 0–0.2)
BASOPHILS NFR BLD AUTO: 0.1 % — SIGNIFICANT CHANGE UP (ref 0–2)
BUN SERPL-MCNC: 16 MG/DL — SIGNIFICANT CHANGE UP (ref 7–23)
CALCIUM SERPL-MCNC: 8.8 MG/DL — SIGNIFICANT CHANGE UP (ref 8.4–10.5)
CHLORIDE SERPL-SCNC: 109 MMOL/L — HIGH (ref 98–107)
CO2 SERPL-SCNC: 27 MMOL/L — SIGNIFICANT CHANGE UP (ref 22–31)
CREAT SERPL-MCNC: 0.6 MG/DL — SIGNIFICANT CHANGE UP (ref 0.5–1.3)
EOSINOPHIL # BLD AUTO: 0.04 K/UL — SIGNIFICANT CHANGE UP (ref 0–0.5)
EOSINOPHIL NFR BLD AUTO: 0.2 % — SIGNIFICANT CHANGE UP (ref 0–6)
GLUCOSE SERPL-MCNC: 123 MG/DL — HIGH (ref 70–99)
HCT VFR BLD CALC: 35.1 % — SIGNIFICANT CHANGE UP (ref 34.5–45)
HGB BLD-MCNC: 10.5 G/DL — LOW (ref 11.5–15.5)
IMM GRANULOCYTES NFR BLD AUTO: 0.7 % — SIGNIFICANT CHANGE UP (ref 0–1.5)
LYMPHOCYTES # BLD AUTO: 1.15 K/UL — SIGNIFICANT CHANGE UP (ref 1–3.3)
LYMPHOCYTES # BLD AUTO: 7.1 % — LOW (ref 13–44)
MAGNESIUM SERPL-MCNC: 2.1 MG/DL — SIGNIFICANT CHANGE UP (ref 1.6–2.6)
MCHC RBC-ENTMCNC: 29.2 PG — SIGNIFICANT CHANGE UP (ref 27–34)
MCHC RBC-ENTMCNC: 29.9 % — LOW (ref 32–36)
MCV RBC AUTO: 97.8 FL — SIGNIFICANT CHANGE UP (ref 80–100)
MONOCYTES # BLD AUTO: 0.48 K/UL — SIGNIFICANT CHANGE UP (ref 0–0.9)
MONOCYTES NFR BLD AUTO: 2.9 % — SIGNIFICANT CHANGE UP (ref 2–14)
NEUTROPHILS # BLD AUTO: 14.47 K/UL — HIGH (ref 1.8–7.4)
NEUTROPHILS NFR BLD AUTO: 89 % — HIGH (ref 43–77)
NRBC # FLD: 0 K/UL — SIGNIFICANT CHANGE UP (ref 0–0)
PHOSPHATE SERPL-MCNC: 3 MG/DL — SIGNIFICANT CHANGE UP (ref 2.5–4.5)
PLATELET # BLD AUTO: 162 K/UL — SIGNIFICANT CHANGE UP (ref 150–400)
PMV BLD: 11.4 FL — SIGNIFICANT CHANGE UP (ref 7–13)
POTASSIUM SERPL-MCNC: 3.2 MMOL/L — LOW (ref 3.5–5.3)
POTASSIUM SERPL-SCNC: 3.2 MMOL/L — LOW (ref 3.5–5.3)
RBC # BLD: 3.59 M/UL — LOW (ref 3.8–5.2)
RBC # FLD: 14.6 % — HIGH (ref 10.3–14.5)
SODIUM SERPL-SCNC: 147 MMOL/L — HIGH (ref 135–145)
WBC # BLD: 16.28 K/UL — HIGH (ref 3.8–10.5)
WBC # FLD AUTO: 16.28 K/UL — HIGH (ref 3.8–10.5)

## 2019-11-10 PROCEDURE — 99233 SBSQ HOSP IP/OBS HIGH 50: CPT | Mod: GC

## 2019-11-10 PROCEDURE — 99231 SBSQ HOSP IP/OBS SF/LOW 25: CPT

## 2019-11-10 RX ORDER — SODIUM CHLORIDE 9 MG/ML
1000 INJECTION, SOLUTION INTRAVENOUS
Refills: 0 | Status: DISCONTINUED | OUTPATIENT
Start: 2019-11-10 | End: 2019-11-21

## 2019-11-10 RX ORDER — POTASSIUM CHLORIDE 20 MEQ
20 PACKET (EA) ORAL
Refills: 0 | Status: COMPLETED | OUTPATIENT
Start: 2019-11-10 | End: 2019-11-10

## 2019-11-10 RX ADMIN — Medication 100 MILLIGRAM(S): at 14:33

## 2019-11-10 RX ADMIN — SENNA PLUS 2 TABLET(S): 8.6 TABLET ORAL at 23:00

## 2019-11-10 RX ADMIN — Medication 100 MILLIGRAM(S): at 05:35

## 2019-11-10 RX ADMIN — PIPERACILLIN AND TAZOBACTAM 25 GRAM(S): 4; .5 INJECTION, POWDER, LYOPHILIZED, FOR SOLUTION INTRAVENOUS at 05:34

## 2019-11-10 RX ADMIN — Medication 3 MILLILITER(S): at 16:46

## 2019-11-10 RX ADMIN — SODIUM CHLORIDE 3 MILLILITER(S): 9 INJECTION INTRAMUSCULAR; INTRAVENOUS; SUBCUTANEOUS at 16:46

## 2019-11-10 RX ADMIN — PIPERACILLIN AND TAZOBACTAM 25 GRAM(S): 4; .5 INJECTION, POWDER, LYOPHILIZED, FOR SOLUTION INTRAVENOUS at 14:34

## 2019-11-10 RX ADMIN — ENOXAPARIN SODIUM 30 MILLIGRAM(S): 100 INJECTION SUBCUTANEOUS at 14:32

## 2019-11-10 RX ADMIN — POLYETHYLENE GLYCOL 3350 17 GRAM(S): 17 POWDER, FOR SOLUTION ORAL at 14:34

## 2019-11-10 RX ADMIN — Medication 20 MILLIEQUIVALENT(S): at 14:33

## 2019-11-10 RX ADMIN — SODIUM CHLORIDE 3 MILLILITER(S): 9 INJECTION INTRAMUSCULAR; INTRAVENOUS; SUBCUTANEOUS at 23:15

## 2019-11-10 RX ADMIN — Medication 3 MILLILITER(S): at 10:59

## 2019-11-10 RX ADMIN — Medication 3 MILLILITER(S): at 23:05

## 2019-11-10 RX ADMIN — Medication 20 MILLIEQUIVALENT(S): at 10:17

## 2019-11-10 RX ADMIN — Medication 3 MILLILITER(S): at 04:47

## 2019-11-10 RX ADMIN — PIPERACILLIN AND TAZOBACTAM 25 GRAM(S): 4; .5 INJECTION, POWDER, LYOPHILIZED, FOR SOLUTION INTRAVENOUS at 22:59

## 2019-11-10 RX ADMIN — SODIUM CHLORIDE 3 MILLILITER(S): 9 INJECTION INTRAMUSCULAR; INTRAVENOUS; SUBCUTANEOUS at 10:59

## 2019-11-10 RX ADMIN — SODIUM CHLORIDE 50 MILLILITER(S): 9 INJECTION, SOLUTION INTRAVENOUS at 10:17

## 2019-11-10 NOTE — PROGRESS NOTE ADULT - ASSESSMENT
89 yo F with PMH of dementia (AAOx2 at baseline) presenting with weakness and SOB x 1 day, found to have positive UA, in A fib with RVR, admitted for sepsis likely 2/2 aspiration PNA, course complicated by hypoxia with suspicion for PE. 89 yo F with PMH of dementia (AAOx2 at baseline) presenting with weakness and SOB x 1 day, found to have positive UA, in A fib with RVR, admitted for sepsis likely 2/2 aspiration PNA, course c/b incidentally found perforated cholecystitis on Zosyn pending IR eval for perc vidhya

## 2019-11-10 NOTE — PROGRESS NOTE ADULT - PROBLEM SELECTOR PLAN 7
DVT ppx: lovenox daily  Diet: pleasure feeds  Dispo: Plan is for home with home PT  Transitions of Care Status:  1.  Name of PCP:   2.  PCP Contacted on Admission: [ ] Y    [ x] N    3.  PCP contacted at Discharge: [ ] Y    [ ] N    [ ] N/A  4.  Post-Discharge Appointment Date and Location:  5.  Summary of Handoff given to PCP: Transitions of Care Status:  1.  Name of PCP:   2.  PCP Contacted on Admission: [ ] Y    [ x] N    3.  PCP contacted at Discharge: [ ] Y    [ ] N    [ ] N/A  4.  Post-Discharge Appointment Date and Location:  5.  Summary of Handoff given to PCP:

## 2019-11-10 NOTE — PROGRESS NOTE ADULT - SUBJECTIVE AND OBJECTIVE BOX
PROGRESS NOTE:   Authoted by Dr. Canelo Vidal MD, S  Pager [phone] [facility], [id number] [address]     Patient is a 89y old  Female who presents with a chief complaint of SOB (09 Nov 2019 06:25)      SUBJECTIVE / OVERNIGHT EVENTS:    ADDITIONAL REVIEW OF SYSTEMS:    MEDICATIONS  (STANDING):  albuterol/ipratropium for Nebulization 3 milliLiter(s) Nebulizer every 6 hours  enoxaparin Injectable 30 milliGRAM(s) SubCutaneous daily  guaiFENesin    Syrup 100 milliGRAM(s) Oral every 6 hours  influenza   Vaccine 0.5 milliLiter(s) IntraMuscular once  piperacillin/tazobactam IVPB.. 3.375 Gram(s) IV Intermittent every 8 hours  polyethylene glycol 3350 17 Gram(s) Oral daily  senna 2 Tablet(s) Oral at bedtime  sodium chloride 3%  Inhalation 3 milliLiter(s) Inhalation three times a day    MEDICATIONS  (PRN):  acetaminophen   Tablet .. 650 milliGRAM(s) Oral every 6 hours PRN Temp greater or equal to 38C (100.4F), Mild Pain (1 - 3), Moderate Pain (4 - 6), Severe Pain (7 - 10)      CAPILLARY BLOOD GLUCOSE        I&O's Summary    08 Nov 2019 07:01  -  09 Nov 2019 07:00  --------------------------------------------------------  IN: 0 mL / OUT: 350 mL / NET: -350 mL        PHYSICAL EXAM:  Vital Signs Last 24 Hrs  T(C): 36.9 (09 Nov 2019 22:13), Max: 36.9 (09 Nov 2019 05:33)  T(F): 98.5 (09 Nov 2019 22:13), Max: 98.5 (09 Nov 2019 05:33)  HR: 95 (09 Nov 2019 22:13) (61 - 103)  BP: 138/88 (09 Nov 2019 22:13) (114/87 - 138/88)  BP(mean): --  RR: 18 (09 Nov 2019 22:13) (17 - 20)  SpO2: 98% (09 Nov 2019 22:13) (94% - 98%)    CONSTITUTIONAL: NAD, well-developed  RESPIRATORY: Normal respiratory effort; lungs are clear to auscultation bilaterally  CARDIOVASCULAR: Regular rate and rhythm, normal S1 and S2, no murmur/rub/gallop; No lower extremity edema; Peripheral pulses are 2+ bilaterally  ABDOMEN: Nontender to palpation, normoactive bowel sounds, no rebound/guarding; No hepatosplenomegaly  MUSCLOSKELETAL: no clubbing or cyanosis of digits; no joint swelling or tenderness to palpation  PSYCH: A+O to person, place, and time; affect appropriate    LABS:                        10.8   13.15 )-----------( 173      ( 09 Nov 2019 05:15 )             36.1     11-09    146<H>  |  108<H>  |  17  ----------------------------<  104<H>  3.8   |  27  |  0.61    Ca    9.1      09 Nov 2019 05:15  Phos  3.2     11-09  Mg     2.1     11-09                  RADIOLOGY & ADDITIONAL TESTS:  Results Reviewed:   Imaging Personally Reviewed:  Electrocardiogram Personally Reviewed:    COORDINATION OF CARE:  Care Discussed with Consultants/Other Providers [Y/N]:  Prior or Outpatient Records Reviewed [Y/N]: PROGRESS NOTE:     Patient is a 89y old  Female who presents with a chief complaint of SOB (09 Nov 2019 06:25)    SUBJECTIVE / OVERNIGHT EVENTS: Pt seen and examined at bedside.  Per night float, no calls or acute issues.  Per pt, she has no active complaints; denies dyspnea and abdominal pain.    ADDITIONAL REVIEW OF SYSTEMS: ROS otherwise negative    MEDICATIONS  (STANDING):  albuterol/ipratropium for Nebulization 3 milliLiter(s) Nebulizer every 6 hours  enoxaparin Injectable 30 milliGRAM(s) SubCutaneous daily  guaiFENesin    Syrup 100 milliGRAM(s) Oral every 6 hours  influenza   Vaccine 0.5 milliLiter(s) IntraMuscular once  piperacillin/tazobactam IVPB.. 3.375 Gram(s) IV Intermittent every 8 hours  polyethylene glycol 3350 17 Gram(s) Oral daily  senna 2 Tablet(s) Oral at bedtime  sodium chloride 3%  Inhalation 3 milliLiter(s) Inhalation three times a day    MEDICATIONS  (PRN):  acetaminophen   Tablet .. 650 milliGRAM(s) Oral every 6 hours PRN Temp greater or equal to 38C (100.4F), Mild Pain (1 - 3), Moderate Pain (4 - 6), Severe Pain (7 - 10)    I&O's Summary    08 Nov 2019 07:01  -  09 Nov 2019 07:00  --------------------------------------------------------  IN: 0 mL / OUT: 350 mL / NET: -350 mL    PHYSICAL EXAM:  Vital Signs Last 24 Hrs  T(C): 36.9 (09 Nov 2019 22:13), Max: 36.9 (09 Nov 2019 05:33)  T(F): 98.5 (09 Nov 2019 22:13), Max: 98.5 (09 Nov 2019 05:33)  HR: 95 (09 Nov 2019 22:13) (61 - 103)  BP: 138/88 (09 Nov 2019 22:13) (114/87 - 138/88)  BP(mean): --  RR: 18 (09 Nov 2019 22:13) (17 - 20)  SpO2: 98% (09 Nov 2019 22:13) (94% - 98%)    CONSTITUTIONAL: NAD, well-developed  RESPIRATORY: Normal respiratory effort with good air entry. Crackles in L lung from base to mid-lung posteriorly. R lung decreased breath sounds at lung base.  CARDIOVASCULAR: Irregularly irregular rhythm, normal S1 and S2, with 2/6 systolic murmur radiating to the carotids; No lower extremity edema.  ABDOMEN: Nontender to palpation, normoactive bowel sounds, no rebound/guarding; No hepatosplenomegaly  MUSCLOSKELETAL: no clubbing or cyanosis of digits; no joint swelling or tenderness to palpation. R-sided wrist wound, bandaged.  PSYCH: AAOx1 to person; affect appropriate    LABS:                        10.8   13.15 )-----------( 173      ( 09 Nov 2019 05:15 )             36.1     11-09    146<H>  |  108<H>  |  17  ----------------------------<  104<H>  3.8   |  27  |  0.61    Ca    9.1      09 Nov 2019 05:15  Phos  3.2     11-09  Mg     2.1     11-09 PROGRESS NOTE:     Patient is a 89y old  Female who presents with a chief complaint of SOB (09 Nov 2019 06:25)    SUBJECTIVE / OVERNIGHT EVENTS: Pt seen and examined at bedside.  Per night float, no calls or acute issues.  Per pt, she has no active complaints; denies dyspnea and abdominal pain.  Pt had 2 BMs over night per nurse.    ADDITIONAL REVIEW OF SYSTEMS: ROS otherwise negative    MEDICATIONS  (STANDING):  albuterol/ipratropium for Nebulization 3 milliLiter(s) Nebulizer every 6 hours  enoxaparin Injectable 30 milliGRAM(s) SubCutaneous daily  guaiFENesin    Syrup 100 milliGRAM(s) Oral every 6 hours  influenza   Vaccine 0.5 milliLiter(s) IntraMuscular once  piperacillin/tazobactam IVPB.. 3.375 Gram(s) IV Intermittent every 8 hours  polyethylene glycol 3350 17 Gram(s) Oral daily  senna 2 Tablet(s) Oral at bedtime  sodium chloride 3%  Inhalation 3 milliLiter(s) Inhalation three times a day    MEDICATIONS  (PRN):  acetaminophen   Tablet .. 650 milliGRAM(s) Oral every 6 hours PRN Temp greater or equal to 38C (100.4F), Mild Pain (1 - 3), Moderate Pain (4 - 6), Severe Pain (7 - 10)    I&O's Summary    08 Nov 2019 07:01  -  09 Nov 2019 07:00  --------------------------------------------------------  IN: 0 mL / OUT: 350 mL / NET: -350 mL    PHYSICAL EXAM:  Vital Signs Last 24 Hrs  T(C): 36.9 (09 Nov 2019 22:13), Max: 36.9 (09 Nov 2019 05:33)  T(F): 98.5 (09 Nov 2019 22:13), Max: 98.5 (09 Nov 2019 05:33)  HR: 95 (09 Nov 2019 22:13) (61 - 103)  BP: 138/88 (09 Nov 2019 22:13) (114/87 - 138/88)  BP(mean): --  RR: 18 (09 Nov 2019 22:13) (17 - 20)  SpO2: 98% (09 Nov 2019 22:13) (94% - 98%)    CONSTITUTIONAL: NAD, well-developed  RESPIRATORY: Normal respiratory effort with good air entry. Crackles in L lung from base to mid-lung posteriorly. R lung decreased breath sounds at lung base.  CARDIOVASCULAR: Irregularly irregular rhythm, normal S1 and S2, with 2/6 systolic murmur radiating to the carotids; No lower extremity edema.  ABDOMEN: Nontender to palpation, normoactive bowel sounds, no rebound/guarding; No hepatosplenomegaly  MUSCLOSKELETAL: no clubbing or cyanosis of digits; no joint swelling or tenderness to palpation. R-sided wrist wound, bandaged.  PSYCH: AAOx1 to person; affect appropriate    LABS:  11-10    147<H>  |  109<H>  |  16  ----------------------------<  123<H>  3.2<L>   |  27  |  0.60    Ca    8.8      10 Nov 2019 06:06  Phos  3.0     11-10  Mg     2.1     11-10                10.5   16.28 )-----------( 162      ( 10 Nov 2019 06:06 )             35.1

## 2019-11-10 NOTE — PROGRESS NOTE ADULT - ASSESSMENT
89yoF Hx of dementia (AOx2 at baseline) presented on 11/1 with weakness and SOB found to have perforated cholecystitis.    Plan  NPO  Zosyn  Recommend IR for perc vidhya  No need for surgical intervention    Surgery B  [id number]

## 2019-11-10 NOTE — PROGRESS NOTE ADULT - PROBLEM SELECTOR PLAN 4
Resolved. Presented febrile, tachy, elevated WBC. UA positive. CXR with possible L effusion.   - sepsis likely 2/2 aspiration PNA given the h/o of coughing with PO intake (and gurgling)  - c/w Zosyn 3.375g q8h (11/1-) for gallbladder perforation Systolic murmur radiating to carotids noted on exam  -Conservative fluid management

## 2019-11-10 NOTE — PROGRESS NOTE ADULT - SUBJECTIVE AND OBJECTIVE BOX
Surgery Team B Progress Note    Subjective  - Pt seen and examined on AM rounds  - Pt resting comfortably in bed      Objective  GEN: NAD resting comfortably  CV: RRR  Pulm: No increased WOB  Abd: Soft, nTTP, ND    Vital Signs  T(C): 37.2 (11-10-19 @ 05:29), Max: 37.2 (11-10-19 @ 05:29)  T(F): 98.9 (11-10-19 @ 05:29), Max: 98.9 (11-10-19 @ 05:29)  HR: 100 (11-10-19 @ 05:29) (61 - 103)  BP: 134/90 (11-10-19 @ 05:29) (130/87 - 138/88)  RR: 17 (11-10-19 @ 05:29) (17 - 20)  SpO2: 98% (11-10-19 @ 05:29) (94% - 98%)      09 Nov 2019 07:01  -  10 Nov 2019 07:00  --------------------------------------------------------  IN:  Total IN: 0 mL    OUT:    Voided: 500 mL  Total OUT: 500 mL    Total NET: -500 mL

## 2019-11-10 NOTE — PROGRESS NOTE ADULT - PROBLEM SELECTOR PLAN 2
Incidental liver mass found on CT chest to evaluate hypoxia. CT AP (11/8) showed enlarged gallbladder with mural wall thickening, evidence of perforation.  -Surgery recs appreciated  -Continue with Zosyn 3.375g q8h /(11/1-)  -Continue with pleasure feeds as per discussion with son  -LARON brewer on Monday Incidental liver mass found on CT chest to evaluate hypoxia. CT AP (11/8) showed enlarged gallbladder with mural wall thickening, evidence of perforation.  -Continue with Zosyn 3.375g q8h /(11/1-)  -Continue with pleasure feeds as per discussion with son  - f/u IR evaluation recs on Monday 11/10

## 2019-11-10 NOTE — PROGRESS NOTE ADULT - PROBLEM SELECTOR PLAN 1
R pleural effusion > L pleural effusion with RLL total lung collapse seen on CTA. No PE. Thoracentesis held off due to high risk.  - c/w aspiration precautions  - Will coordinate outpatient oxygen requirements R pleural effusion > L pleural effusion with RLL total lung collapse seen on CTA. No PE. Thoracentesis held off due to high risk  - c/w aspiration precautions  - Will coordinate outpatient oxygen requirements

## 2019-11-10 NOTE — PROGRESS NOTE ADULT - PROBLEM SELECTOR PLAN 5
CHADsVASc 3. Afib likely driven by sepsis.   - ctm off AC for now; h/o GIB in past   - soft BPs, will hold off on rate control, HR currently stable in the 90s. Sodium fluctuates; generally on the higher side secondary to poor PO intake and insensible losses; given AS, unable to aggressive rehydrate   - trend BMP for Na  - D5 if Na > 150

## 2019-11-10 NOTE — PROGRESS NOTE ADULT - PROBLEM SELECTOR PLAN 6
Systolic murmur radiating to carotids noted on exam  -Conservative fluid management DVT ppx: lovenox daily  Diet: pleasure feeds  Dispo: Plan is for home with home PT

## 2019-11-10 NOTE — PROGRESS NOTE ADULT - PROBLEM SELECTOR PLAN 3
Sodium had been slowly uptrending. Patient euvolemic and s/p trial of lasix for hypoxia.  - f/u daily BMPs and give D5 as necessary CHADsVASc 3. Afib likely driven by sepsis.   - ctm off AC for now; h/o GIB in past   - soft BPs, will hold off on rate control, HR currently stable in the 90s.

## 2019-11-11 LAB
ANION GAP SERPL CALC-SCNC: 10 MMO/L — SIGNIFICANT CHANGE UP (ref 7–14)
APTT BLD: 29.5 SEC — SIGNIFICANT CHANGE UP (ref 27.5–36.3)
BASOPHILS # BLD AUTO: 0.03 K/UL — SIGNIFICANT CHANGE UP (ref 0–0.2)
BASOPHILS NFR BLD AUTO: 0.2 % — SIGNIFICANT CHANGE UP (ref 0–2)
BUN SERPL-MCNC: 15 MG/DL — SIGNIFICANT CHANGE UP (ref 7–23)
CALCIUM SERPL-MCNC: 8.7 MG/DL — SIGNIFICANT CHANGE UP (ref 8.4–10.5)
CHLORIDE SERPL-SCNC: 106 MMOL/L — SIGNIFICANT CHANGE UP (ref 98–107)
CO2 SERPL-SCNC: 27 MMOL/L — SIGNIFICANT CHANGE UP (ref 22–31)
CREAT SERPL-MCNC: 0.59 MG/DL — SIGNIFICANT CHANGE UP (ref 0.5–1.3)
EOSINOPHIL # BLD AUTO: 0.04 K/UL — SIGNIFICANT CHANGE UP (ref 0–0.5)
EOSINOPHIL NFR BLD AUTO: 0.3 % — SIGNIFICANT CHANGE UP (ref 0–6)
GLUCOSE SERPL-MCNC: 126 MG/DL — HIGH (ref 70–99)
HCT VFR BLD CALC: 34.8 % — SIGNIFICANT CHANGE UP (ref 34.5–45)
HGB BLD-MCNC: 10.6 G/DL — LOW (ref 11.5–15.5)
IMM GRANULOCYTES NFR BLD AUTO: 0.8 % — SIGNIFICANT CHANGE UP (ref 0–1.5)
INR BLD: 1.13 — SIGNIFICANT CHANGE UP (ref 0.88–1.17)
LYMPHOCYTES # BLD AUTO: 1.06 K/UL — SIGNIFICANT CHANGE UP (ref 1–3.3)
LYMPHOCYTES # BLD AUTO: 6.8 % — LOW (ref 13–44)
MAGNESIUM SERPL-MCNC: 2.1 MG/DL — SIGNIFICANT CHANGE UP (ref 1.6–2.6)
MCHC RBC-ENTMCNC: 29.9 PG — SIGNIFICANT CHANGE UP (ref 27–34)
MCHC RBC-ENTMCNC: 30.5 % — LOW (ref 32–36)
MCV RBC AUTO: 98 FL — SIGNIFICANT CHANGE UP (ref 80–100)
MONOCYTES # BLD AUTO: 0.45 K/UL — SIGNIFICANT CHANGE UP (ref 0–0.9)
MONOCYTES NFR BLD AUTO: 2.9 % — SIGNIFICANT CHANGE UP (ref 2–14)
NEUTROPHILS # BLD AUTO: 13.89 K/UL — HIGH (ref 1.8–7.4)
NEUTROPHILS NFR BLD AUTO: 89 % — HIGH (ref 43–77)
NRBC # FLD: 0 K/UL — SIGNIFICANT CHANGE UP (ref 0–0)
PHOSPHATE SERPL-MCNC: 2.4 MG/DL — LOW (ref 2.5–4.5)
PLATELET # BLD AUTO: 160 K/UL — SIGNIFICANT CHANGE UP (ref 150–400)
PMV BLD: 11.4 FL — SIGNIFICANT CHANGE UP (ref 7–13)
POTASSIUM SERPL-MCNC: 3.4 MMOL/L — LOW (ref 3.5–5.3)
POTASSIUM SERPL-SCNC: 3.4 MMOL/L — LOW (ref 3.5–5.3)
PROTHROM AB SERPL-ACNC: 12.6 SEC — SIGNIFICANT CHANGE UP (ref 9.8–13.1)
RBC # BLD: 3.55 M/UL — LOW (ref 3.8–5.2)
RBC # FLD: 14.6 % — HIGH (ref 10.3–14.5)
SODIUM SERPL-SCNC: 143 MMOL/L — SIGNIFICANT CHANGE UP (ref 135–145)
WBC # BLD: 15.59 K/UL — HIGH (ref 3.8–10.5)
WBC # FLD AUTO: 15.59 K/UL — HIGH (ref 3.8–10.5)

## 2019-11-11 PROCEDURE — 99233 SBSQ HOSP IP/OBS HIGH 50: CPT | Mod: GC

## 2019-11-11 RX ORDER — POTASSIUM CHLORIDE 20 MEQ
20 PACKET (EA) ORAL ONCE
Refills: 0 | Status: COMPLETED | OUTPATIENT
Start: 2019-11-11 | End: 2019-11-11

## 2019-11-11 RX ADMIN — Medication 20 MILLIEQUIVALENT(S): at 10:20

## 2019-11-11 RX ADMIN — Medication 100 MILLIGRAM(S): at 07:03

## 2019-11-11 RX ADMIN — POLYETHYLENE GLYCOL 3350 17 GRAM(S): 17 POWDER, FOR SOLUTION ORAL at 22:05

## 2019-11-11 RX ADMIN — PIPERACILLIN AND TAZOBACTAM 25 GRAM(S): 4; .5 INJECTION, POWDER, LYOPHILIZED, FOR SOLUTION INTRAVENOUS at 07:03

## 2019-11-11 RX ADMIN — SODIUM CHLORIDE 3 MILLILITER(S): 9 INJECTION INTRAMUSCULAR; INTRAVENOUS; SUBCUTANEOUS at 08:52

## 2019-11-11 RX ADMIN — SENNA PLUS 2 TABLET(S): 8.6 TABLET ORAL at 22:05

## 2019-11-11 RX ADMIN — PIPERACILLIN AND TAZOBACTAM 25 GRAM(S): 4; .5 INJECTION, POWDER, LYOPHILIZED, FOR SOLUTION INTRAVENOUS at 13:38

## 2019-11-11 RX ADMIN — Medication 3 MILLILITER(S): at 16:37

## 2019-11-11 RX ADMIN — SODIUM CHLORIDE 3 MILLILITER(S): 9 INJECTION INTRAMUSCULAR; INTRAVENOUS; SUBCUTANEOUS at 16:26

## 2019-11-11 RX ADMIN — PIPERACILLIN AND TAZOBACTAM 25 GRAM(S): 4; .5 INJECTION, POWDER, LYOPHILIZED, FOR SOLUTION INTRAVENOUS at 22:05

## 2019-11-11 RX ADMIN — Medication 3 MILLILITER(S): at 04:41

## 2019-11-11 RX ADMIN — Medication 3 MILLILITER(S): at 09:00

## 2019-11-11 RX ADMIN — Medication 3 MILLILITER(S): at 22:11

## 2019-11-11 RX ADMIN — SODIUM CHLORIDE 3 MILLILITER(S): 9 INJECTION INTRAMUSCULAR; INTRAVENOUS; SUBCUTANEOUS at 22:11

## 2019-11-11 NOTE — PROGRESS NOTE ADULT - ASSESSMENT
89 yo F with PMH of dementia (AAOx2 at baseline) presenting with weakness and SOB x 1 day, found to have positive UA, in A fib with RVR, admitted for sepsis likely 2/2 aspiration PNA, course c/b incidentally found perforated cholecystitis on Zosyn pending IR eval for perc vidhya

## 2019-11-11 NOTE — CHART NOTE - NSCHARTNOTEFT_GEN_A_CORE
Patient seen and examined this afternoon.  No complaints of pain.   No abdominal tenderness on exam.    The patient's son was at bedside and he and I had a lengthy discussion regarding the goals of care of his mother  He (and his brother is a co-decision maker) would like to minimize interventions and understands that his mother may become sick from the perforated cholecystitis.  He states that she would not want to live with additional tubes emanating from her abdomen, and certainly would not want surgery.  He believes that she is currently in good spirits, is mentating well, and would benefit from aggressive physical therapy and rehabilitation.    No acute care surgery needs at this time.  Continue care per primary team with ID consultation. Patient seen and examined this afternoon.  No complaints of pain.   No abdominal tenderness on exam.    The patient's son was at bedside and he and I had a lengthy discussion regarding the goals of care of his mother  He (and his brother is a co-decision maker) would like to minimize interventions and understands that his mother may become sick from the perforated cholecystitis.  He states that she would not want to live with additional tubes emanating from her abdomen, and certainly would not want surgery.  He believes that she is currently in good spirits, is mentating well, and would benefit from aggressive physical therapy and rehabilitation.    No acute care surgery needs at this time.  Continue care per primary team with ID consultation.    ACS "B" TEAM ATTENDING ATTESTATION  I have discussed this patient, reviewed pertinent labs and imaging, with colleagues, residents, and physician assistants on B Team rounds.    The active care issues are:  1. perforated cholecystitis, stable on antibiotics    Continue medical management in line with patient/family's wishes.  Re-consult us as needed.    The Acute Care Surgery (B Team) Attending Group Practice:  Dr. Ernie Mosher, Dr. Damon Leigh, Dr. Madhu Templeton, Dr. Bonifacio Florez, Dr. Carlyle Cottrell    urgent issues - spectra [phone] or [phone]  nonurgent issues - [phone]  patient appointments or afterhours - [phone]

## 2019-11-11 NOTE — PROGRESS NOTE ADULT - SUBJECTIVE AND OBJECTIVE BOX
SURGERY PROGRESS NOTE    INTERVAL HPI/OVERNIGHT EVENTS: No acute events overnight. Patient denies abdominal pain this morning. No HA, N/V/D/C.    Vital Signs Last 24 Hrs  T(C): 36.6 (11 Nov 2019 06:59), Max: 36.6 (10 Nov 2019 14:00)  T(F): 97.9 (11 Nov 2019 06:59), Max: 97.9 (10 Nov 2019 22:26)  HR: 90 (11 Nov 2019 06:59) (76 - 99)  BP: 117/74 (11 Nov 2019 06:59) (114/86 - 125/64)  BP(mean): --  RR: 19 (11 Nov 2019 06:59) (17 - 19)  SpO2: 96% (11 Nov 2019 06:59) (92% - 98%)  MEDICATIONS  (STANDING):  albuterol/ipratropium for Nebulization 3 milliLiter(s) Nebulizer every 6 hours  dextrose 5%. 1000 milliLiter(s) (50 mL/Hr) IV Continuous <Continuous>  enoxaparin Injectable 30 milliGRAM(s) SubCutaneous daily  guaiFENesin    Syrup 100 milliGRAM(s) Oral every 6 hours  influenza   Vaccine 0.5 milliLiter(s) IntraMuscular once  piperacillin/tazobactam IVPB.. 3.375 Gram(s) IV Intermittent every 8 hours  polyethylene glycol 3350 17 Gram(s) Oral daily  senna 2 Tablet(s) Oral at bedtime  sodium chloride 3%  Inhalation 3 milliLiter(s) Inhalation three times a day    MEDICATIONS  (PRN):  acetaminophen   Tablet .. 650 milliGRAM(s) Oral every 6 hours PRN Temp greater or equal to 38C (100.4F), Mild Pain (1 - 3), Moderate Pain (4 - 6), Severe Pain (7 - 10)      PHYSICAL EXAM    General: NAD, sleeping comfortably  Neuro: AAO  Resp: Good effort, no distress  GI/Abd: Soft, NTND, no rebound/guarding, no masses palpated  Musculoskeletal: All 4 extremities moving spontaneously, no limitations    I&O:  I&O's Detail    10 Nov 2019 07:01  -  11 Nov 2019 07:00  --------------------------------------------------------  IN:  Total IN: 0 mL    OUT:    Voided: 1000 mL  Total OUT: 1000 mL    Total NET: -1000 mL          LABS:                        10.5   16.28 )-----------( 162      ( 10 Nov 2019 06:06 )             35.1     11-10    147<H>  |  109<H>  |  16  ----------------------------<  123<H>  3.2<L>   |  27  |  0.60    Ca    8.8      10 Nov 2019 06:06  Phos  3.0     11-10  Mg     2.1     11-10      Impression: F with perforated cholecystitis, hemodynamically stable    Plan:  ABX - Zosyn  IR consultation this morning for perc vidhya  Would make patient NPO for potential IR intervention  Pain control prn  AM labs pending, leukocytosis increasing yesterday

## 2019-11-11 NOTE — PROGRESS NOTE ADULT - SUBJECTIVE AND OBJECTIVE BOX
Alise Modi (Malachi) PGY-1  Pager: NS) [phone]/ (STT) [phone]    Patient is a 89y old  Female who presents with a chief complaint of SOB (11 Nov 2019 07:06)      SUBJECTIVE / OVERNIGHT EVENTS:  No events overnight. appears comfortable this AM.       MEDICATIONS  (STANDING):  albuterol/ipratropium for Nebulization 3 milliLiter(s) Nebulizer every 6 hours  dextrose 5%. 1000 milliLiter(s) (50 mL/Hr) IV Continuous <Continuous>  enoxaparin Injectable 30 milliGRAM(s) SubCutaneous daily  guaiFENesin    Syrup 100 milliGRAM(s) Oral every 6 hours  influenza   Vaccine 0.5 milliLiter(s) IntraMuscular once  piperacillin/tazobactam IVPB.. 3.375 Gram(s) IV Intermittent every 8 hours  polyethylene glycol 3350 17 Gram(s) Oral daily  senna 2 Tablet(s) Oral at bedtime  sodium chloride 3%  Inhalation 3 milliLiter(s) Inhalation three times a day    MEDICATIONS  (PRN):  acetaminophen   Tablet .. 650 milliGRAM(s) Oral every 6 hours PRN Temp greater or equal to 38C (100.4F), Mild Pain (1 - 3), Moderate Pain (4 - 6), Severe Pain (7 - 10)          OBJECTIVE:  Vital Signs Last 24 Hrs  T(C): 36.6 (11 Nov 2019 06:59), Max: 36.6 (10 Nov 2019 14:00)  T(F): 97.9 (11 Nov 2019 06:59), Max: 97.9 (10 Nov 2019 22:26)  HR: 90 (11 Nov 2019 06:59) (76 - 99)  BP: 117/74 (11 Nov 2019 06:59) (114/86 - 125/64)  BP(mean): --  RR: 19 (11 Nov 2019 06:59) (17 - 19)  SpO2: 96% (11 Nov 2019 06:59) (92% - 98%)    PHYSICAL EXAM:  CONSTITUTIONAL: NAD  RESPIRATORY: Normal respiratory effort. mild Crackles in L lung from base  CARDIOVASCULAR: Irregularly irregular rhythm, normal S1 and S2, with 2/6 systolic murmur;   ABDOMEN: Nontender to palpation, normoactive bowel sounds, no rebound/guarding; No hepatosplenomegaly  MUSCLOSKELETAL: no clubbing or cyanosis of digits; no joint swelling or tenderness to palpation. R-sided wrist wound, bandaged.  Extremities: No lower extremity edema.  PSYCH: AAOx1 to person; affect appropriate    CAPILLARY BLOOD GLUCOSE        I&O's Summary    10 Nov 2019 07:01  -  11 Nov 2019 07:00  --------------------------------------------------------  IN: 0 mL / OUT: 1500 mL / NET: -1500 mL              LABS:                        10.5   16.28 )-----------( 162      ( 10 Nov 2019 06:06 )             35.1     11-11    143  |  106  |  15  ----------------------------<  126<H>  3.4<L>   |  27  |  0.59    Ca    8.7      11 Nov 2019 06:50  Phos  2.4     11-11  Mg     2.1     11-11                  RADIOLOGY & ADDITIONAL TESTS:

## 2019-11-11 NOTE — PROGRESS NOTE ADULT - PROBLEM SELECTOR PLAN 7
Transitions of Care Status:  1.  Name of PCP:   2.  PCP Contacted on Admission: [ ] Y    [ x] N    3.  PCP contacted at Discharge: [ ] Y    [ ] N    [ ] N/A  4.  Post-Discharge Appointment Date and Location:  5.  Summary of Handoff given to PCP:

## 2019-11-11 NOTE — PROGRESS NOTE ADULT - PROBLEM SELECTOR PLAN 2
Incidental liver mass found on CT chest to evaluate hypoxia. CT AP (11/8) showed enlarged gallbladder with mural wall thickening, evidence of perforation.  -Continue with Zosyn 3.375g q8h /(11/1-)  -Continue with pleasure feeds as per discussion with son  - f/u IR evaluation recs on Monday 11/10

## 2019-11-11 NOTE — PROGRESS NOTE ADULT - PROBLEM SELECTOR PLAN 1
R pleural effusion > L pleural effusion with RLL total lung collapse seen on CTA. No PE. Thoracentesis held off due to high risk  - c/w aspiration precautions  - Will coordinate outpatient oxygen requirements  - saturating 95% on 2L NC. 86-88% on RA.

## 2019-11-11 NOTE — CHART NOTE - NSCHARTNOTEFT_GEN_A_CORE
Patient Age: 89    Patient Gender: F    Procedure (including site/side if known): Perc Yvonne    Diagnosis/Indication: perforated cholecystitis    Interventional Radiology Attending Physician: Dr. Patrick    Ordering Attending Physician: Dr. Lopez    Pertinent medical history: 89 yo F with PMH of dementia (AAOx2 at baseline) presenting with weakness and SOB x 1 day, found to have positive UA, in A fib with RVR, admitted for sepsis likely 2/2 aspiration PNA, course c/b perforated cholecystitis.    Pertinent Labs:                        10.6   15.59 )-----------( 160      ( 11 Nov 2019 06:50 )             34.8      11-11    143  |  106  |  15  ----------------------------<  126<H>  3.4<L>   |  27  |  0.59    Ca    8.7      11 Nov 2019 06:50  Phos  2.4     11-11  Mg     2.1     11-11           Patient and Family aware? Yes      Attending/Resident/NP/PA:     Contact:                               Date:      11/11/19                              time:

## 2019-11-12 LAB
ANION GAP SERPL CALC-SCNC: 13 MMO/L — SIGNIFICANT CHANGE UP (ref 7–14)
BASOPHILS # BLD AUTO: 0.04 K/UL — SIGNIFICANT CHANGE UP (ref 0–0.2)
BASOPHILS NFR BLD AUTO: 0.3 % — SIGNIFICANT CHANGE UP (ref 0–2)
BUN SERPL-MCNC: 15 MG/DL — SIGNIFICANT CHANGE UP (ref 7–23)
CALCIUM SERPL-MCNC: 8.9 MG/DL — SIGNIFICANT CHANGE UP (ref 8.4–10.5)
CHLORIDE SERPL-SCNC: 108 MMOL/L — HIGH (ref 98–107)
CO2 SERPL-SCNC: 22 MMOL/L — SIGNIFICANT CHANGE UP (ref 22–31)
CREAT SERPL-MCNC: 0.59 MG/DL — SIGNIFICANT CHANGE UP (ref 0.5–1.3)
EOSINOPHIL # BLD AUTO: 0.05 K/UL — SIGNIFICANT CHANGE UP (ref 0–0.5)
EOSINOPHIL NFR BLD AUTO: 0.4 % — SIGNIFICANT CHANGE UP (ref 0–6)
GLUCOSE SERPL-MCNC: 83 MG/DL — SIGNIFICANT CHANGE UP (ref 70–99)
HCT VFR BLD CALC: 37.9 % — SIGNIFICANT CHANGE UP (ref 34.5–45)
HGB BLD-MCNC: 11.4 G/DL — LOW (ref 11.5–15.5)
IMM GRANULOCYTES NFR BLD AUTO: 0.6 % — SIGNIFICANT CHANGE UP (ref 0–1.5)
LYMPHOCYTES # BLD AUTO: 1.08 K/UL — SIGNIFICANT CHANGE UP (ref 1–3.3)
LYMPHOCYTES # BLD AUTO: 7.6 % — LOW (ref 13–44)
MAGNESIUM SERPL-MCNC: 2.2 MG/DL — SIGNIFICANT CHANGE UP (ref 1.6–2.6)
MCHC RBC-ENTMCNC: 29.8 PG — SIGNIFICANT CHANGE UP (ref 27–34)
MCHC RBC-ENTMCNC: 30.1 % — LOW (ref 32–36)
MCV RBC AUTO: 99.2 FL — SIGNIFICANT CHANGE UP (ref 80–100)
MONOCYTES # BLD AUTO: 0.5 K/UL — SIGNIFICANT CHANGE UP (ref 0–0.9)
MONOCYTES NFR BLD AUTO: 3.5 % — SIGNIFICANT CHANGE UP (ref 2–14)
NEUTROPHILS # BLD AUTO: 12.4 K/UL — HIGH (ref 1.8–7.4)
NEUTROPHILS NFR BLD AUTO: 87.6 % — HIGH (ref 43–77)
NRBC # FLD: 0 K/UL — SIGNIFICANT CHANGE UP (ref 0–0)
PHOSPHATE SERPL-MCNC: 2.6 MG/DL — SIGNIFICANT CHANGE UP (ref 2.5–4.5)
PLATELET # BLD AUTO: 172 K/UL — SIGNIFICANT CHANGE UP (ref 150–400)
PMV BLD: 11.7 FL — SIGNIFICANT CHANGE UP (ref 7–13)
POTASSIUM SERPL-MCNC: 4.3 MMOL/L — SIGNIFICANT CHANGE UP (ref 3.5–5.3)
POTASSIUM SERPL-SCNC: 4.3 MMOL/L — SIGNIFICANT CHANGE UP (ref 3.5–5.3)
RBC # BLD: 3.82 M/UL — SIGNIFICANT CHANGE UP (ref 3.8–5.2)
RBC # FLD: 14.6 % — HIGH (ref 10.3–14.5)
SODIUM SERPL-SCNC: 143 MMOL/L — SIGNIFICANT CHANGE UP (ref 135–145)
WBC # BLD: 14.16 K/UL — HIGH (ref 3.8–10.5)
WBC # FLD AUTO: 14.16 K/UL — HIGH (ref 3.8–10.5)

## 2019-11-12 PROCEDURE — 99233 SBSQ HOSP IP/OBS HIGH 50: CPT | Mod: GC

## 2019-11-12 RX ORDER — ENOXAPARIN SODIUM 100 MG/ML
40 INJECTION SUBCUTANEOUS DAILY
Refills: 0 | Status: DISCONTINUED | OUTPATIENT
Start: 2019-11-12 | End: 2019-11-21

## 2019-11-12 RX ORDER — ENOXAPARIN SODIUM 100 MG/ML
30 INJECTION SUBCUTANEOUS DAILY
Refills: 0 | Status: DISCONTINUED | OUTPATIENT
Start: 2019-11-12 | End: 2019-11-12

## 2019-11-12 RX ADMIN — SODIUM CHLORIDE 3 MILLILITER(S): 9 INJECTION INTRAMUSCULAR; INTRAVENOUS; SUBCUTANEOUS at 10:17

## 2019-11-12 RX ADMIN — Medication 3 MILLILITER(S): at 04:48

## 2019-11-12 RX ADMIN — SODIUM CHLORIDE 3 MILLILITER(S): 9 INJECTION INTRAMUSCULAR; INTRAVENOUS; SUBCUTANEOUS at 21:30

## 2019-11-12 RX ADMIN — Medication 3 MILLILITER(S): at 15:52

## 2019-11-12 RX ADMIN — PIPERACILLIN AND TAZOBACTAM 25 GRAM(S): 4; .5 INJECTION, POWDER, LYOPHILIZED, FOR SOLUTION INTRAVENOUS at 22:45

## 2019-11-12 RX ADMIN — SENNA PLUS 2 TABLET(S): 8.6 TABLET ORAL at 22:45

## 2019-11-12 RX ADMIN — SODIUM CHLORIDE 3 MILLILITER(S): 9 INJECTION INTRAMUSCULAR; INTRAVENOUS; SUBCUTANEOUS at 15:52

## 2019-11-12 RX ADMIN — ENOXAPARIN SODIUM 40 MILLIGRAM(S): 100 INJECTION SUBCUTANEOUS at 13:48

## 2019-11-12 RX ADMIN — Medication 3 MILLILITER(S): at 21:30

## 2019-11-12 RX ADMIN — PIPERACILLIN AND TAZOBACTAM 25 GRAM(S): 4; .5 INJECTION, POWDER, LYOPHILIZED, FOR SOLUTION INTRAVENOUS at 06:38

## 2019-11-12 RX ADMIN — PIPERACILLIN AND TAZOBACTAM 25 GRAM(S): 4; .5 INJECTION, POWDER, LYOPHILIZED, FOR SOLUTION INTRAVENOUS at 13:45

## 2019-11-12 RX ADMIN — Medication 3 MILLILITER(S): at 10:17

## 2019-11-12 NOTE — PROGRESS NOTE ADULT - PROBLEM SELECTOR PLAN 2
R pleural effusion > L pleural effusion with RLL total lung collapse seen on CTA. No PE. Thoracentesis held off due to high risk  - c/w aspiration precautions  - Will coordinate outpatient oxygen requirements  - saturating 93% on 3L NC this AM.

## 2019-11-12 NOTE — PROGRESS NOTE ADULT - ASSESSMENT
89 yo F with PMH of dementia (AAOx2 at baseline) presenting with weakness and SOB x 1 day, found to have positive UA, in A fib with RVR, admitted for sepsis likely 2/2 aspiration PNA, course c/b incidentally found perforated cholecystitis.

## 2019-11-12 NOTE — PROGRESS NOTE ADULT - PROBLEM SELECTOR PLAN 5
Sodium fluctuates; generally on the higher side secondary to poor PO intake and insensible losses; given AS, unable to aggressive rehydrate   - trend BMP for Na  - D5 if Na > 150

## 2019-11-12 NOTE — PROGRESS NOTE ADULT - PROBLEM SELECTOR PLAN 1
Incidental liver mass found on CT chest to evaluate hypoxia. CT AP (11/8) showed enlarged gallbladder with mural wall thickening, evidence of perforation.  -Continue with Zosyn 3.375g q8h /(11/1-)  -Continue with pleasure feeds as per discussion with son  - IR recs per vidhya, however both IR and surg agree perc tube will stay in for life. son wishes for no invasive/ tube placement for life. seems to be opting for aggressive rehab

## 2019-11-12 NOTE — PROGRESS NOTE ADULT - SUBJECTIVE AND OBJECTIVE BOX
Alise Modi (Malachi) PGY-1  Pager: NS) [phone]/ ([phone]) [phone]    Patient is a 89y old  Female who presents with a chief complaint of SOB (11 Nov 2019 08:32)      SUBJECTIVE / OVERNIGHT EVENTS:  No acute complaints over night. more alert today.       MEDICATIONS  (STANDING):  albuterol/ipratropium for Nebulization 3 milliLiter(s) Nebulizer every 6 hours  dextrose 5%. 1000 milliLiter(s) (50 mL/Hr) IV Continuous <Continuous>  guaiFENesin    Syrup 100 milliGRAM(s) Oral every 6 hours  influenza   Vaccine 0.5 milliLiter(s) IntraMuscular once  piperacillin/tazobactam IVPB.. 3.375 Gram(s) IV Intermittent every 8 hours  polyethylene glycol 3350 17 Gram(s) Oral daily  senna 2 Tablet(s) Oral at bedtime  sodium chloride 3%  Inhalation 3 milliLiter(s) Inhalation three times a day    MEDICATIONS  (PRN):  acetaminophen   Tablet .. 650 milliGRAM(s) Oral every 6 hours PRN Temp greater or equal to 38C (100.4F), Mild Pain (1 - 3), Moderate Pain (4 - 6), Severe Pain (7 - 10)          OBJECTIVE:  Vital Signs Last 24 Hrs  T(C): 36.4 (12 Nov 2019 05:28), Max: 36.8 (11 Nov 2019 21:01)  T(F): 97.6 (12 Nov 2019 05:28), Max: 98.2 (11 Nov 2019 21:01)  HR: 86 (12 Nov 2019 05:28) (74 - 92)  BP: 117/70 (12 Nov 2019 05:28) (117/70 - 120/76)  BP(mean): --  RR: 18 (12 Nov 2019 05:28) (18 - 18)  SpO2: 95% (12 Nov 2019 05:28) (94% - 97%)  PHYSICAL EXAM:  GEN: NAD  RESPIRATORY: Normal respiratory effort. mild Crackles in L lung from base  CARDIOVASCULAR: Irregularly irregular rhythm, normal S1 and S2, with 2/6 systolic murmur;   ABDOMEN: Nontender to palpation, normoactive bowel sounds, no rebound/guarding; No hepatosplenomegaly  MSK: no clubbing or cyanosis of digits; no joint swelling or tenderness to palpation. R-sided wrist wound, bandaged.  Extremities: No lower extremity edema.  PSYCH: AAOx2 to person and place; affect appropriate      CAPILLARY BLOOD GLUCOSE        I&O's Summary            LABS:                        10.6   15.59 )-----------( 160      ( 11 Nov 2019 06:50 )             34.8     11-11    143  |  106  |  15  ----------------------------<  126<H>  3.4<L>   |  27  |  0.59    Ca    8.7      11 Nov 2019 06:50  Phos  2.4     11-11  Mg     2.1     11-11      PT/INR - ( 11 Nov 2019 09:46 )   PT: 12.6 SEC;   INR: 1.13          PTT - ( 11 Nov 2019 09:46 )  PTT:29.5 SEC            RADIOLOGY & ADDITIONAL TESTS:

## 2019-11-12 NOTE — PROGRESS NOTE ADULT - PROBLEM SELECTOR PLAN 3
CHADsVASc 3. Afib likely driven by sepsis.   - ctm off AC for now; h/o GIB in past   - will hold off on rate control, HR currently stable in the 80s

## 2019-11-13 PROCEDURE — 99233 SBSQ HOSP IP/OBS HIGH 50: CPT | Mod: GC

## 2019-11-13 RX ADMIN — PIPERACILLIN AND TAZOBACTAM 25 GRAM(S): 4; .5 INJECTION, POWDER, LYOPHILIZED, FOR SOLUTION INTRAVENOUS at 06:07

## 2019-11-13 RX ADMIN — Medication 100 MILLIGRAM(S): at 06:08

## 2019-11-13 RX ADMIN — Medication 3 MILLILITER(S): at 21:37

## 2019-11-13 RX ADMIN — Medication 3 MILLILITER(S): at 11:09

## 2019-11-13 RX ADMIN — Medication 3 MILLILITER(S): at 17:03

## 2019-11-13 RX ADMIN — SODIUM CHLORIDE 3 MILLILITER(S): 9 INJECTION INTRAMUSCULAR; INTRAVENOUS; SUBCUTANEOUS at 11:10

## 2019-11-13 RX ADMIN — SODIUM CHLORIDE 3 MILLILITER(S): 9 INJECTION INTRAMUSCULAR; INTRAVENOUS; SUBCUTANEOUS at 17:08

## 2019-11-13 RX ADMIN — SODIUM CHLORIDE 3 MILLILITER(S): 9 INJECTION INTRAMUSCULAR; INTRAVENOUS; SUBCUTANEOUS at 21:37

## 2019-11-13 RX ADMIN — Medication 100 MILLIGRAM(S): at 14:05

## 2019-11-13 RX ADMIN — SENNA PLUS 2 TABLET(S): 8.6 TABLET ORAL at 22:08

## 2019-11-13 RX ADMIN — PIPERACILLIN AND TAZOBACTAM 25 GRAM(S): 4; .5 INJECTION, POWDER, LYOPHILIZED, FOR SOLUTION INTRAVENOUS at 14:06

## 2019-11-13 RX ADMIN — PIPERACILLIN AND TAZOBACTAM 25 GRAM(S): 4; .5 INJECTION, POWDER, LYOPHILIZED, FOR SOLUTION INTRAVENOUS at 22:08

## 2019-11-13 RX ADMIN — ENOXAPARIN SODIUM 40 MILLIGRAM(S): 100 INJECTION SUBCUTANEOUS at 14:05

## 2019-11-13 RX ADMIN — Medication 100 MILLIGRAM(S): at 17:22

## 2019-11-13 NOTE — PROGRESS NOTE ADULT - PROBLEM SELECTOR PLAN 2
R pleural effusion > L pleural effusion with RLL total lung collapse seen on CTA. No PE. Thoracentesis held off due to high risk  - c/w aspiration precautions  - Will coordinate outpatient oxygen requirements  - saturating 93-94% on 3L NC this AM.

## 2019-11-13 NOTE — PROGRESS NOTE ADULT - PROBLEM SELECTOR PLAN 1
Incidental liver mass found on CT chest to evaluate hypoxia. CT AP (11/8) showed enlarged gallbladder with mural wall thickening, evidence of perforation.  -Continue with Zosyn 3.375g q8h /(11/1-)  -Continue with pleasure feeds as per discussion with son  - IR recs per vidhya, however both IR and surg agree perc tube will stay in for life. son wishes for no invasive/ tube placement for life. seems to be opting for aggressive rehab  -will discuss rehab options vs hospice. Hospice nurse will see patient and talk to son today.

## 2019-11-13 NOTE — PROGRESS NOTE ADULT - SUBJECTIVE AND OBJECTIVE BOX
Alise Modi (Malachi) PGY-1  Pager: NS) [phone]/ (JUY) [phone]    Patient is a 89y old  Female who presents with a chief complaint of SOB (12 Nov 2019 08:19)      SUBJECTIVE / OVERNIGHT EVENTS:  No acute events overnight.       MEDICATIONS  (STANDING):  albuterol/ipratropium for Nebulization 3 milliLiter(s) Nebulizer every 6 hours  dextrose 5%. 1000 milliLiter(s) (50 mL/Hr) IV Continuous <Continuous>  enoxaparin Injectable 40 milliGRAM(s) SubCutaneous daily  guaiFENesin    Syrup 100 milliGRAM(s) Oral every 6 hours  influenza   Vaccine 0.5 milliLiter(s) IntraMuscular once  piperacillin/tazobactam IVPB.. 3.375 Gram(s) IV Intermittent every 8 hours  polyethylene glycol 3350 17 Gram(s) Oral daily  senna 2 Tablet(s) Oral at bedtime  sodium chloride 3%  Inhalation 3 milliLiter(s) Inhalation three times a day    MEDICATIONS  (PRN):  acetaminophen   Tablet .. 650 milliGRAM(s) Oral every 6 hours PRN Temp greater or equal to 38C (100.4F), Mild Pain (1 - 3), Moderate Pain (4 - 6), Severe Pain (7 - 10)          OBJECTIVE:  Vital Signs Last 24 Hrs  T(C): 36.7 (13 Nov 2019 06:04), Max: 36.7 (12 Nov 2019 14:36)  T(F): 98.1 (13 Nov 2019 06:04), Max: 98.1 (13 Nov 2019 06:04)  HR: 90 (13 Nov 2019 06:04) (75 - 90)  BP: 130/88 (13 Nov 2019 06:04) (120/70 - 136/92)  BP(mean): --  RR: 20 (13 Nov 2019 06:04) (18 - 20)  SpO2: 97% (13 Nov 2019 06:04) (95% - 97%)    PHYSICAL EXAM:  GEN: no acute distress, laying in bed comfortably.  GEN: no acute distressORY: Normal respiratory effort. mild Crackles in R lung from base. dec. BS left lower lobe  CARDIOVASCULAR: Irregularly irregular rhythm, normal S1 and S2, with 2/6 systolic murmur;   ABDOMEN: Nontender to palpation, normoactive bowel sounds, no rebound/guarding; No hepatosplenomegaly  MSK: no clubbing or cyanosis of digits; no joint swelling or tenderness to palpation. R-sided wrist wound, bandaged.  Extremities: No lower extremity edema.  PSYCH: AAOx2 to person and place; affect appropriate      CAPILLARY BLOOD GLUCOSE        I&O's Summary    12 Nov 2019 07:01  -  13 Nov 2019 07:00  --------------------------------------------------------  IN: 0 mL / OUT: 850 mL / NET: -850 mL              LABS:                        11.4   14.16 )-----------( 172      ( 12 Nov 2019 06:36 )             37.9     11-12    143  |  108<H>  |  15  ----------------------------<  83  4.3   |  22  |  0.59    Ca    8.9      12 Nov 2019 06:36  Phos  2.6     11-12  Mg     2.2     11-12      PT/INR - ( 11 Nov 2019 09:46 )   PT: 12.6 SEC;   INR: 1.13          PTT - ( 11 Nov 2019 09:46 )  PTT:29.5 SEC            RADIOLOGY & ADDITIONAL TESTS:

## 2019-11-14 LAB
ANION GAP SERPL CALC-SCNC: 11 MMO/L — SIGNIFICANT CHANGE UP (ref 7–14)
APTT BLD: 29.7 SEC — SIGNIFICANT CHANGE UP (ref 27.5–36.3)
BUN SERPL-MCNC: 17 MG/DL — SIGNIFICANT CHANGE UP (ref 7–23)
CALCIUM SERPL-MCNC: 9.2 MG/DL — SIGNIFICANT CHANGE UP (ref 8.4–10.5)
CHLORIDE SERPL-SCNC: 106 MMOL/L — SIGNIFICANT CHANGE UP (ref 98–107)
CO2 SERPL-SCNC: 26 MMOL/L — SIGNIFICANT CHANGE UP (ref 22–31)
CREAT SERPL-MCNC: 0.67 MG/DL — SIGNIFICANT CHANGE UP (ref 0.5–1.3)
GLUCOSE SERPL-MCNC: 113 MG/DL — HIGH (ref 70–99)
HCT VFR BLD CALC: 38.1 % — SIGNIFICANT CHANGE UP (ref 34.5–45)
HGB BLD-MCNC: 11.4 G/DL — LOW (ref 11.5–15.5)
INR BLD: 1.04 — SIGNIFICANT CHANGE UP (ref 0.88–1.17)
MAGNESIUM SERPL-MCNC: 2.3 MG/DL — SIGNIFICANT CHANGE UP (ref 1.6–2.6)
MCHC RBC-ENTMCNC: 29.4 PG — SIGNIFICANT CHANGE UP (ref 27–34)
MCHC RBC-ENTMCNC: 29.9 % — LOW (ref 32–36)
MCV RBC AUTO: 98.2 FL — SIGNIFICANT CHANGE UP (ref 80–100)
NRBC # FLD: 0 K/UL — SIGNIFICANT CHANGE UP (ref 0–0)
PHOSPHATE SERPL-MCNC: 2.8 MG/DL — SIGNIFICANT CHANGE UP (ref 2.5–4.5)
PLATELET # BLD AUTO: 250 K/UL — SIGNIFICANT CHANGE UP (ref 150–400)
PMV BLD: 11 FL — SIGNIFICANT CHANGE UP (ref 7–13)
POTASSIUM SERPL-MCNC: 3.7 MMOL/L — SIGNIFICANT CHANGE UP (ref 3.5–5.3)
POTASSIUM SERPL-SCNC: 3.7 MMOL/L — SIGNIFICANT CHANGE UP (ref 3.5–5.3)
PROTHROM AB SERPL-ACNC: 11.9 SEC — SIGNIFICANT CHANGE UP (ref 9.8–13.1)
RBC # BLD: 3.88 M/UL — SIGNIFICANT CHANGE UP (ref 3.8–5.2)
RBC # FLD: 14.7 % — HIGH (ref 10.3–14.5)
SODIUM SERPL-SCNC: 143 MMOL/L — SIGNIFICANT CHANGE UP (ref 135–145)
WBC # BLD: 10.18 K/UL — SIGNIFICANT CHANGE UP (ref 3.8–10.5)
WBC # FLD AUTO: 10.18 K/UL — SIGNIFICANT CHANGE UP (ref 3.8–10.5)

## 2019-11-14 PROCEDURE — 99233 SBSQ HOSP IP/OBS HIGH 50: CPT | Mod: GC

## 2019-11-14 PROCEDURE — 74177 CT ABD & PELVIS W/CONTRAST: CPT | Mod: 26

## 2019-11-14 RX ADMIN — PIPERACILLIN AND TAZOBACTAM 25 GRAM(S): 4; .5 INJECTION, POWDER, LYOPHILIZED, FOR SOLUTION INTRAVENOUS at 22:38

## 2019-11-14 RX ADMIN — PIPERACILLIN AND TAZOBACTAM 25 GRAM(S): 4; .5 INJECTION, POWDER, LYOPHILIZED, FOR SOLUTION INTRAVENOUS at 05:56

## 2019-11-14 RX ADMIN — Medication 100 MILLIGRAM(S): at 05:56

## 2019-11-14 RX ADMIN — Medication 100 MILLIGRAM(S): at 17:44

## 2019-11-14 RX ADMIN — PIPERACILLIN AND TAZOBACTAM 25 GRAM(S): 4; .5 INJECTION, POWDER, LYOPHILIZED, FOR SOLUTION INTRAVENOUS at 16:07

## 2019-11-14 RX ADMIN — Medication 3 MILLILITER(S): at 09:41

## 2019-11-14 RX ADMIN — Medication 3 MILLILITER(S): at 22:14

## 2019-11-14 RX ADMIN — SODIUM CHLORIDE 3 MILLILITER(S): 9 INJECTION INTRAMUSCULAR; INTRAVENOUS; SUBCUTANEOUS at 16:03

## 2019-11-14 RX ADMIN — Medication 3 MILLILITER(S): at 16:03

## 2019-11-14 RX ADMIN — SODIUM CHLORIDE 3 MILLILITER(S): 9 INJECTION INTRAMUSCULAR; INTRAVENOUS; SUBCUTANEOUS at 22:23

## 2019-11-14 RX ADMIN — SODIUM CHLORIDE 3 MILLILITER(S): 9 INJECTION INTRAMUSCULAR; INTRAVENOUS; SUBCUTANEOUS at 09:41

## 2019-11-14 NOTE — PROGRESS NOTE ADULT - SUBJECTIVE AND OBJECTIVE BOX
Alise Modi (Malachi) PGY-1  Pager: (ns) [phone]/ ([facility]) [phone]    Patient is a 89y old  Female who presents with a chief complaint of SOB (13 Nov 2019 08:39)      SUBJECTIVE / OVERNIGHT EVENTS:  No acute events overnight.       MEDICATIONS  (STANDING):  albuterol/ipratropium for Nebulization 3 milliLiter(s) Nebulizer every 6 hours  dextrose 5%. 1000 milliLiter(s) (50 mL/Hr) IV Continuous <Continuous>  enoxaparin Injectable 40 milliGRAM(s) SubCutaneous daily  guaiFENesin    Syrup 100 milliGRAM(s) Oral every 6 hours  influenza   Vaccine 0.5 milliLiter(s) IntraMuscular once  piperacillin/tazobactam IVPB.. 3.375 Gram(s) IV Intermittent every 8 hours  polyethylene glycol 3350 17 Gram(s) Oral daily  senna 2 Tablet(s) Oral at bedtime  sodium chloride 3%  Inhalation 3 milliLiter(s) Inhalation three times a day    MEDICATIONS  (PRN):  acetaminophen   Tablet .. 650 milliGRAM(s) Oral every 6 hours PRN Temp greater or equal to 38C (100.4F), Mild Pain (1 - 3), Moderate Pain (4 - 6), Severe Pain (7 - 10)          OBJECTIVE:  Vital Signs Last 24 Hrs  T(C): 36.6 (14 Nov 2019 05:50), Max: 36.7 (13 Nov 2019 14:52)  T(F): 97.9 (14 Nov 2019 05:50), Max: 98 (13 Nov 2019 14:52)  HR: 90 (14 Nov 2019 05:50) (53 - 97)  BP: 122/60 (14 Nov 2019 05:50) (122/60 - 125/82)  BP(mean): --  RR: 20 (14 Nov 2019 05:50) (20 - 20)  SpO2: 96% (14 Nov 2019 05:50) (95% - 97%)    PHYSICAL EXAM:  GENERAL: NAD, well-developed  HEAD:  Atraumatic, Normocephalic  EYES:conjunctiva and sclera clear  NECK: Supple, No JVD  CHEST/LUNG: Clear to auscultation right lung. decreased BS appreciated left lower lung; No wheeze  CARDIOVASCULAR: Irregularly irregular rhythm, normal S1 and S2, with 2/6 systolic murmur;   ABDOMEN: Nontender to palpation, normoactive bowel sounds, no rebound/guarding; No hepatosplenomegaly  MSK: no clubbing or cyanosis of digits; no joint swelling or tenderness to palpation. R-sided wrist wound, bandaged.  Extremities: No lower extremity edema.  PSYCH: AAOx1 to person; affect appropriate    CAPILLARY BLOOD GLUCOSE        I&O's Summary    13 Nov 2019 07:01  -  14 Nov 2019 07:00  --------------------------------------------------------  IN: 0 mL / OUT: 700 mL / NET: -700 mL              LABS:                        11.4   10.18 )-----------( 250      ( 14 Nov 2019 06:50 )             38.1           PT/INR - ( 14 Nov 2019 06:50 )   PT: 11.9 SEC;   INR: 1.04          PTT - ( 14 Nov 2019 06:50 )  PTT:29.7 SEC            RADIOLOGY & ADDITIONAL TESTS:

## 2019-11-14 NOTE — PROGRESS NOTE ADULT - PROBLEM SELECTOR PLAN 1
Incidental liver mass found on CT chest to evaluate hypoxia. CT AP (11/8) showed enlarged gallbladder with mural wall thickening, evidence of perforation.  -Continue with Zosyn 3.375g q8h /(11/1-)  -Continue with pleasure feeds as per discussion with son  - IR recs per vidhya, however both IR and surg agree perc tube will stay in for life. son wishes for no invasive/ tube placement for life. seems to be opting for aggressive rehab  -rehab is not an option as patient is asp. risk  -will revisit the idea of perc vidhya by IR.

## 2019-11-14 NOTE — PROGRESS NOTE ADULT - PROVIDER SPECIALTY LIST ADULT
Internal Medicine Problem: DISCHARGE PLANNING  Goal: Discharge to home or other facility with appropriate resources  INTERVENTIONS:  - Identify barriers to discharge w/patient and caregiver  - Deny SI, depression and anxiety  Symptoms will resolve or diminish upon discharge  - Comply with meds, attend 75% of group therapy and identify positive coping skills  - Arrange for needed discharge resources and transportation as appropriate  - Identify discharge learning needs (meds, outpatient mental health services)  - Arrange for interpretive services to assist at discharge as needed  - Follow up with ICM, PHP, and psych rehab referrals    - Refer to Case Management Department for coordinating discharge planning if the patient needs post-hospital services based on physician/advanced practitioner order or complex needs related to functional status, cognitive ability, or social support system    Outcome: Progressing  Worker completed and faxed [facility] referral

## 2019-11-15 PROCEDURE — 99233 SBSQ HOSP IP/OBS HIGH 50: CPT | Mod: GC

## 2019-11-15 RX ORDER — CIPROFLOXACIN LACTATE 400MG/40ML
500 VIAL (ML) INTRAVENOUS EVERY 12 HOURS
Refills: 0 | Status: DISCONTINUED | OUTPATIENT
Start: 2019-11-15 | End: 2019-11-21

## 2019-11-15 RX ADMIN — Medication 500 MILLIGRAM(S): at 17:09

## 2019-11-15 RX ADMIN — Medication 3 MILLILITER(S): at 16:10

## 2019-11-15 RX ADMIN — ENOXAPARIN SODIUM 40 MILLIGRAM(S): 100 INJECTION SUBCUTANEOUS at 12:24

## 2019-11-15 RX ADMIN — PIPERACILLIN AND TAZOBACTAM 25 GRAM(S): 4; .5 INJECTION, POWDER, LYOPHILIZED, FOR SOLUTION INTRAVENOUS at 06:40

## 2019-11-15 RX ADMIN — Medication 3 MILLILITER(S): at 22:47

## 2019-11-15 RX ADMIN — Medication 3 MILLILITER(S): at 05:02

## 2019-11-15 RX ADMIN — SENNA PLUS 2 TABLET(S): 8.6 TABLET ORAL at 22:59

## 2019-11-15 RX ADMIN — SODIUM CHLORIDE 3 MILLILITER(S): 9 INJECTION INTRAMUSCULAR; INTRAVENOUS; SUBCUTANEOUS at 11:05

## 2019-11-15 RX ADMIN — Medication 100 MILLIGRAM(S): at 17:09

## 2019-11-15 RX ADMIN — SODIUM CHLORIDE 3 MILLILITER(S): 9 INJECTION INTRAMUSCULAR; INTRAVENOUS; SUBCUTANEOUS at 16:10

## 2019-11-15 RX ADMIN — Medication 1 TABLET(S): at 17:09

## 2019-11-15 RX ADMIN — Medication 3 MILLILITER(S): at 11:05

## 2019-11-15 RX ADMIN — POLYETHYLENE GLYCOL 3350 17 GRAM(S): 17 POWDER, FOR SOLUTION ORAL at 12:25

## 2019-11-15 RX ADMIN — Medication 100 MILLIGRAM(S): at 12:25

## 2019-11-15 RX ADMIN — Medication 100 MILLIGRAM(S): at 06:40

## 2019-11-15 RX ADMIN — SODIUM CHLORIDE 3 MILLILITER(S): 9 INJECTION INTRAMUSCULAR; INTRAVENOUS; SUBCUTANEOUS at 22:47

## 2019-11-15 NOTE — GOALS OF CARE CONVERSATION - ADVANCED CARE PLANNING - CONVERSATION DETAILS
[facility]    HCN RN met with pt's son Jarret. Hospice care, services, eligibility and consents explained. Folder of information provided. Pt's son is interested in home hospice care. Pt's clinical documentation will be presented to hospice medical director for approval/confirmation of pt's eligibility for hospice.     HCN RN continuing to follow.

## 2019-11-15 NOTE — PROGRESS NOTE ADULT - SUBJECTIVE AND OBJECTIVE BOX
Patient is a 89y old  Female who presents with a chief complaint of SOB (14 Nov 2019 08:00)      SUBJECTIVE / OVERNIGHT EVENTS: No overnight events. Tolerating pleasure feeds.   Patient denies chest pain, SOB, palpitations, abdominal pain, nausea, vomiting, chills, and cough.    MEDICATIONS  (STANDING):  albuterol/ipratropium for Nebulization 3 milliLiter(s) Nebulizer every 6 hours  dextrose 5%. 1000 milliLiter(s) (50 mL/Hr) IV Continuous <Continuous>  enoxaparin Injectable 40 milliGRAM(s) SubCutaneous daily  guaiFENesin    Syrup 100 milliGRAM(s) Oral every 6 hours  influenza   Vaccine 0.5 milliLiter(s) IntraMuscular once  polyethylene glycol 3350 17 Gram(s) Oral daily  senna 2 Tablet(s) Oral at bedtime  sodium chloride 3%  Inhalation 3 milliLiter(s) Inhalation three times a day    MEDICATIONS  (PRN):  acetaminophen   Tablet .. 650 milliGRAM(s) Oral every 6 hours PRN Temp greater or equal to 38C (100.4F), Mild Pain (1 - 3), Moderate Pain (4 - 6), Severe Pain (7 - 10)      T(F): 98 (11-15 @ 15:26), Max: 98.2 (11-14 @ 16:31)  HR: 88 (11-15 @ 15:26) (84 - 94)  BP: 134/88 (11-15 @ 15:26) (123/67 - 134/88)  RR: 16 (11-15 @ 15:26) (16 - 18)  SpO2: 95% (11-15 @ 15:26) (92% - 98%)  CAPILLARY BLOOD GLUCOSE        I&O's Summary      PHYSICAL EXAM:  GEN: Appears in no acute distress, NC in place  HEENT: PERRLA, EOMI and accommodate, neck supple, no lymphadenopathy, no JVD  MOUTH: moist mucous membranes, no exudates or lesions   PULM: Clear to auscultation bilaterally, no wheezes, rales, rhonchi  CV: RRR, S1S2, no murmurs, rubs or gallops  Abdominal: Soft, nontender to palpation, non-distended, normoactive bowel sounds  Extremities: WWP, No edema, + peripheral pulses  NEURO: AAOx3  Skin: No rashes, lesions, hematomas, ecchymosis      LABS:  Labs personally reviewed.                        11.4   10.18 )-----------( 250      ( 14 Nov 2019 06:50 )             38.1     Hgb Trend: 11.4<--, 11.4<--, 10.6<--, 10.5<--, 10.8<--  11-14    143  |  106  |  17  ----------------------------<  113<H>  3.7   |  26  |  0.67    Ca    9.2      14 Nov 2019 06:50  Phos  2.8     11-14  Mg     2.3     11-14      Creatinine Trend: 0.67<--, 0.59<--, 0.59<--, 0.60<--, 0.61<--, 0.55<--  PT/INR - ( 14 Nov 2019 06:50 )   PT: 11.9 SEC;   INR: 1.04          PTT - ( 14 Nov 2019 06:50 )  PTT:29.7 SEC      RADIOLOGY & ADDITIONAL TESTS:  Imaging Personally Reviewed.    Consultants:      Aly Zaidi PGY-3 Pager: JANINA [phone]/ [phone]  Night Float: JANINA [phone]/ NS

## 2019-11-15 NOTE — PROGRESS NOTE ADULT - PROBLEM SELECTOR PLAN 1
Incidental liver mass found on CT chest to evaluate hypoxia. CT AP (11/8) showed enlarged gallbladder with mural wall thickening, evidence of perforation.  -dc Zosyn 3.375g q8h /(11/1- 11/15)  -Continue with pleasure feeds as per discussion with son  -Will start PO Abx, cipro and augmentin

## 2019-11-16 PROCEDURE — 99233 SBSQ HOSP IP/OBS HIGH 50: CPT | Mod: GC

## 2019-11-16 RX ADMIN — Medication 100 MILLIGRAM(S): at 11:56

## 2019-11-16 RX ADMIN — ENOXAPARIN SODIUM 40 MILLIGRAM(S): 100 INJECTION SUBCUTANEOUS at 11:56

## 2019-11-16 RX ADMIN — Medication 3 MILLILITER(S): at 16:14

## 2019-11-16 RX ADMIN — Medication 3 MILLILITER(S): at 03:45

## 2019-11-16 RX ADMIN — POLYETHYLENE GLYCOL 3350 17 GRAM(S): 17 POWDER, FOR SOLUTION ORAL at 11:56

## 2019-11-16 RX ADMIN — SODIUM CHLORIDE 3 MILLILITER(S): 9 INJECTION INTRAMUSCULAR; INTRAVENOUS; SUBCUTANEOUS at 09:55

## 2019-11-16 RX ADMIN — SODIUM CHLORIDE 3 MILLILITER(S): 9 INJECTION INTRAMUSCULAR; INTRAVENOUS; SUBCUTANEOUS at 16:15

## 2019-11-16 RX ADMIN — Medication 500 MILLIGRAM(S): at 06:19

## 2019-11-16 RX ADMIN — SENNA PLUS 2 TABLET(S): 8.6 TABLET ORAL at 21:49

## 2019-11-16 RX ADMIN — Medication 100 MILLIGRAM(S): at 17:16

## 2019-11-16 RX ADMIN — Medication 100 MILLIGRAM(S): at 06:19

## 2019-11-16 RX ADMIN — Medication 1 TABLET(S): at 17:16

## 2019-11-16 RX ADMIN — Medication 1 TABLET(S): at 06:19

## 2019-11-16 RX ADMIN — Medication 3 MILLILITER(S): at 09:55

## 2019-11-16 RX ADMIN — SODIUM CHLORIDE 3 MILLILITER(S): 9 INJECTION INTRAMUSCULAR; INTRAVENOUS; SUBCUTANEOUS at 21:36

## 2019-11-16 RX ADMIN — Medication 3 MILLILITER(S): at 21:36

## 2019-11-16 RX ADMIN — Medication 500 MILLIGRAM(S): at 17:16

## 2019-11-16 NOTE — PROGRESS NOTE ADULT - PROBLEM SELECTOR PLAN 2
R pleural effusion > L pleural effusion with RLL total lung collapse seen on CTA. No PE. Thoracentesis held off due to high risk  - c/w aspiration precautions  - Will coordinate outpatient oxygen requirements  - saturating 93-94% on 2L NC this AM. improved

## 2019-11-16 NOTE — PROGRESS NOTE ADULT - SUBJECTIVE AND OBJECTIVE BOX
Alise Modi (Malachi) PGY-1  Pager: NS) [phone]/ (EPR) [phone]    Patient is a 89y old  Female who presents with a chief complaint of SOB (15 Nov 2019 16:06)      SUBJECTIVE / OVERNIGHT EVENTS:  No acute complaints over night. Denies any fevers/chills, headache, CP, SOB, abd pain, N/V/D, constipation, or leg swelling.   states she feels well, no pain,       MEDICATIONS  (STANDING):  albuterol/ipratropium for Nebulization 3 milliLiter(s) Nebulizer every 6 hours  amoxicillin  875 milliGRAM(s)/clavulanate 1 Tablet(s) Oral two times a day  ciprofloxacin     Tablet 500 milliGRAM(s) Oral every 12 hours  dextrose 5%. 1000 milliLiter(s) (50 mL/Hr) IV Continuous <Continuous>  enoxaparin Injectable 40 milliGRAM(s) SubCutaneous daily  guaiFENesin    Syrup 100 milliGRAM(s) Oral every 6 hours  influenza   Vaccine 0.5 milliLiter(s) IntraMuscular once  polyethylene glycol 3350 17 Gram(s) Oral daily  senna 2 Tablet(s) Oral at bedtime  sodium chloride 3%  Inhalation 3 milliLiter(s) Inhalation three times a day    MEDICATIONS  (PRN):  acetaminophen   Tablet .. 650 milliGRAM(s) Oral every 6 hours PRN Temp greater or equal to 38C (100.4F), Mild Pain (1 - 3), Moderate Pain (4 - 6), Severe Pain (7 - 10)          OBJECTIVE:  Vital Signs Last 24 Hrs  T(C): 36.6 (16 Nov 2019 06:17), Max: 36.7 (15 Nov 2019 15:26)  T(F): 97.9 (16 Nov 2019 06:17), Max: 98 (15 Nov 2019 15:26)  HR: 79 (16 Nov 2019 06:17) (79 - 90)  BP: 126/90 (16 Nov 2019 06:17) (126/90 - 134/88)  BP(mean): --  RR: 18 (16 Nov 2019 06:17) (16 - 18)  SpO2: 96% (16 Nov 2019 06:17) (94% - 98%)  PHYSICAL EXAM:  GENERAL: NAD, well-developed  HEAD:  Atraumatic, Normocephalic  EYES: conjunctiva and sclera clear  NECK: Supple, No JVD  CHEST/LUNG: Clear to auscultation right lung. decreased BS appreciated left lower lung; No wheeze  CARDIOVASCULAR: Irregularly irregular rhythm, normal S1 and S2, with 2/6 systolic murmur;   ABDOMEN: Nontender to palpation, normoactive bowel sounds, no rebound/guarding; No hepatosplenomegaly  MSK: no clubbing or cyanosis of digits; no joint swelling or tenderness to palpation.   Extremities: No lower extremity edema.  PSYCH: AAOx1 to person; affect appropriate    CAPILLARY BLOOD GLUCOSE        I&O's Summary    15 Nov 2019 07:01  -  16 Nov 2019 07:00  --------------------------------------------------------  IN: 0 mL / OUT: 650 mL / NET: -650 mL              LABS:                      RADIOLOGY & ADDITIONAL TESTS:

## 2019-11-16 NOTE — PROGRESS NOTE ADULT - PROBLEM SELECTOR PLAN 1
Incidental liver mass found on CT chest to evaluate hypoxia. CT AP (11/8) showed enlarged gallbladder with mural wall thickening, evidence of perforation.  -dc Zosyn 3.375g q8h /(11/1- 11/15)  -Continue with pleasure feeds as per discussion with son  -started PO Abx, cipro and augmentin 11/16 Incidental liver mass found on CT chest to evaluate hypoxia. CT AP (11/8) showed enlarged gallbladder with mural wall thickening, evidence of perforation.  -dc Zosyn 3.375g q8h /(11/1- 11/15)  -Continue with pleasure feeds as per discussion with son  -started PO Abx, cipro and augmentin 11/15

## 2019-11-16 NOTE — GOALS OF CARE CONVERSATION - ADVANCED CARE PLANNING - CONVERSATION DETAILS
[facility]    HCN RN completed collaboration with HCN MD. Pt is eligible for home hospice care. Pt's son Jarret was informed.  He will consider home hospice care as a possible discharge plan. Reviewed hospice care and services with him and addressed all questions.    Pt's son has the folder of written information about hospice care; he understands return of signed consent forms is need to proceed with a home hospice plan of care; forms have not yet been signed.    There is no consent for hospice care at this time.    Hospice RNs remain available to assist with hospice care planning - if desired by pt's family.

## 2019-11-17 LAB
HCT VFR BLD CALC: 35.3 % — SIGNIFICANT CHANGE UP (ref 34.5–45)
HGB BLD-MCNC: 10.5 G/DL — LOW (ref 11.5–15.5)
MCHC RBC-ENTMCNC: 29.3 PG — SIGNIFICANT CHANGE UP (ref 27–34)
MCHC RBC-ENTMCNC: 29.7 % — LOW (ref 32–36)
MCV RBC AUTO: 98.6 FL — SIGNIFICANT CHANGE UP (ref 80–100)
NRBC # FLD: 0 K/UL — SIGNIFICANT CHANGE UP (ref 0–0)
PLATELET # BLD AUTO: 313 K/UL — SIGNIFICANT CHANGE UP (ref 150–400)
PMV BLD: 10.2 FL — SIGNIFICANT CHANGE UP (ref 7–13)
RBC # BLD: 3.58 M/UL — LOW (ref 3.8–5.2)
RBC # FLD: 15.3 % — HIGH (ref 10.3–14.5)
WBC # BLD: 8.17 K/UL — SIGNIFICANT CHANGE UP (ref 3.8–10.5)
WBC # FLD AUTO: 8.17 K/UL — SIGNIFICANT CHANGE UP (ref 3.8–10.5)

## 2019-11-17 PROCEDURE — 99233 SBSQ HOSP IP/OBS HIGH 50: CPT | Mod: GC

## 2019-11-17 RX ORDER — CHLORHEXIDINE GLUCONATE 213 G/1000ML
15 SOLUTION TOPICAL
Refills: 0 | Status: COMPLETED | OUTPATIENT
Start: 2019-11-17 | End: 2019-11-19

## 2019-11-17 RX ADMIN — Medication 1 TABLET(S): at 17:13

## 2019-11-17 RX ADMIN — SODIUM CHLORIDE 3 MILLILITER(S): 9 INJECTION INTRAMUSCULAR; INTRAVENOUS; SUBCUTANEOUS at 16:08

## 2019-11-17 RX ADMIN — Medication 3 MILLILITER(S): at 09:48

## 2019-11-17 RX ADMIN — Medication 3 MILLILITER(S): at 21:55

## 2019-11-17 RX ADMIN — ENOXAPARIN SODIUM 40 MILLIGRAM(S): 100 INJECTION SUBCUTANEOUS at 12:23

## 2019-11-17 RX ADMIN — Medication 1 TABLET(S): at 06:36

## 2019-11-17 RX ADMIN — SODIUM CHLORIDE 3 MILLILITER(S): 9 INJECTION INTRAMUSCULAR; INTRAVENOUS; SUBCUTANEOUS at 09:52

## 2019-11-17 RX ADMIN — CHLORHEXIDINE GLUCONATE 15 MILLILITER(S): 213 SOLUTION TOPICAL at 17:13

## 2019-11-17 RX ADMIN — Medication 3 MILLILITER(S): at 16:09

## 2019-11-17 RX ADMIN — SODIUM CHLORIDE 3 MILLILITER(S): 9 INJECTION INTRAMUSCULAR; INTRAVENOUS; SUBCUTANEOUS at 22:02

## 2019-11-17 RX ADMIN — SENNA PLUS 2 TABLET(S): 8.6 TABLET ORAL at 22:44

## 2019-11-17 RX ADMIN — Medication 3 MILLILITER(S): at 03:35

## 2019-11-17 RX ADMIN — Medication 500 MILLIGRAM(S): at 17:13

## 2019-11-17 RX ADMIN — Medication 500 MILLIGRAM(S): at 06:36

## 2019-11-17 NOTE — PROGRESS NOTE ADULT - SUBJECTIVE AND OBJECTIVE BOX
Alise Modi (Malachi) PGY-1  Pager: NS) [phone]/ ([phone]) [phone]    Patient is a 89y old  Female who presents with a chief complaint of SOB (16 Nov 2019 08:26)      SUBJECTIVE / OVERNIGHT EVENTS:  No acute events overnight. states slept well. no abd pain.       MEDICATIONS  (STANDING):  albuterol/ipratropium for Nebulization 3 milliLiter(s) Nebulizer every 6 hours  amoxicillin  875 milliGRAM(s)/clavulanate 1 Tablet(s) Oral two times a day  ciprofloxacin     Tablet 500 milliGRAM(s) Oral every 12 hours  dextrose 5%. 1000 milliLiter(s) (50 mL/Hr) IV Continuous <Continuous>  enoxaparin Injectable 40 milliGRAM(s) SubCutaneous daily  influenza   Vaccine 0.5 milliLiter(s) IntraMuscular once  senna 2 Tablet(s) Oral at bedtime  sodium chloride 3%  Inhalation 3 milliLiter(s) Inhalation three times a day    MEDICATIONS  (PRN):  acetaminophen   Tablet .. 650 milliGRAM(s) Oral every 6 hours PRN Temp greater or equal to 38C (100.4F), Mild Pain (1 - 3), Moderate Pain (4 - 6), Severe Pain (7 - 10)          OBJECTIVE:  Vital Signs Last 24 Hrs  T(C): 36.7 (17 Nov 2019 05:02), Max: 36.9 (16 Nov 2019 21:45)  T(F): 98 (17 Nov 2019 05:02), Max: 98.4 (16 Nov 2019 21:45)  HR: 84 (17 Nov 2019 05:02) (80 - 89)  BP: 115/72 (17 Nov 2019 05:02) (111/78 - 117/77)  BP(mean): --  RR: 18 (17 Nov 2019 05:02) (18 - 18)  SpO2: 95% (17 Nov 2019 05:02) (93% - 98%)  PHYSICAL EXAM:  GENERAL: NAD, well-developed  HEAD:  Atraumatic, Normocephalic  EYES: conjunctiva and sclera clear  NECK: Supple, No JVD  CHEST/LUNG: Clear to auscultation right lung. decreased BS appreciated left lower lung; No wheeze  CARDIOVASCULAR: Irregularly irregular rhythm, normal S1 and S2, with 2/6 systolic murmur;   ABDOMEN: Nontender to palpation, normoactive bowel sounds, no rebound/guarding; No hepatosplenomegaly  MSK: no clubbing or cyanosis of digits; no joint swelling or tenderness to palpation.   Extremities: No lower extremity edema.  PSYCH: AAOx1 to person; affect appropriate    CAPILLARY BLOOD GLUCOSE        I&O's Summary    16 Nov 2019 07:01  -  17 Nov 2019 07:00  --------------------------------------------------------  IN: 50 mL / OUT: 200 mL / NET: -150 mL              LABS:                        10.5   8.17  )-----------( 313      ( 17 Nov 2019 05:58 )             35.3                       RADIOLOGY & ADDITIONAL TESTS:

## 2019-11-17 NOTE — CONSULT NOTE ADULT - SUBJECTIVE AND OBJECTIVE BOX
Dental paged by MD team (per request of patient's son) in regards to dislodged LR bridge (#28-30) with accompanying implant #28, and remaining exposed post/core #30. Bridge was dislodged last night when patient was eating (soft food diet). Son placed restoration in denture box for safe keeping. Son is worried that post is sharp.     Limited bedside exam was performed.   EOE: WNL  IOE: Socket #28 moderately inflamed with granulation tissue. Tooth #30 shows intact, prepped, gold core. Oral hygiene is poor - biofilm and food debris noted throughout vestibule. No pain on percussion or palpation. No signs of acute infection.   Other: Whole implant connected to abutment crown #28. Bridge #28-30 intact.     Explained findings to son.   1) Recommended peridex for inflammation of site #28. Son was concerned of aspiration risk of liquid into lungs. Suggested that peridex be delivered via monojet and suctioned. Emphasized that the goal of peridex was to remove biofilm in area and lessen inflammation.     2) Patient does not state that post #30 is sharp or that it is bothering her. Post #30 stable. Post not sharp to finger palpation.     3) Provider suggested returning to original provider who placed implant many times for definitive care however, son stated multiple times that it was not possible due to stairs/accesibilty. Son wanted to know what I can do about the situation. Explained that dental clinic is not open today and that I need a more comprehensive exam with radiographs to determine what can be done. Explained that the implant could not be reimplanted and if the crown still fits for tooth #30, it maybe possible cut the bridge and recement #30. Emphasized that I as a resident do not make the final call to treatment and that I need to speak to my attending about the case.     Son wants mother to be seen at the dental clinic in the hospital and BEFORE patient's discharge so, "he doesn't have to worry about it later." Told son that many steps need to occur (1- I have to speak to my attending. 2 -The inpatient residents on duty and patient's MD team need to coordinate for transport to dental clinic. 3 -Patient also needs medical clearance.) and I cannot guarantee that patient's mother will be seen tomorrow. Son urged the provider to get the process going so, patient can be seen ASAP.     Patient's son wants to be contacted when sign out of the case occurs from resident to resident/attending.   Contact information: 	Ever Schrader	[phone]    Son requested that provider write the assessment/plan in notepad with contact information for his records.         ASSESSMENT: Implant #28 attached to dislodged bridge #28-30. "Extraction" site #28 moderately inflamed. Tooth #30 is missing restoration.  Patient needs further work up to determine definitive treatment. Ideally, follow up would occur at the provider who placed the restoration in question (implant #28, bridge #28-30) however, son is adamant that mother is seen for further evaluation/definitive treatment in hospital.       PLAN:  1) Peridex MW 2x/day. Delivery via monojet syringe and immediate suction.   2) MD team must complete medical clearance prior to transportation to dental clinic. Medical consult must address:    - Whether patient is medically stable for dental treatment. Any specialty precautions to care?    - Should patient require invasive dental treatment, does patient need antibiotic prophylaxis (hx of hip surgery)? Can patient receive local anesthesia with epinephrine?    - Whether patient can consent to her own medical treatment   - How patient is transportable (ideally wheelchair or stretcher)       --------------  Patient is a 89y old  Female who presents with a chief complaint of SOB (17 Nov 2019 09:47)      HPI:  89 yo F with PMH of dementia (AAOx2 at baseline) presenting with weakness and SOB x 1 day. Patient has 24/7 HHA who noticed that patient had SOB earlier today, appeared uncomfortable. She felt intermittently hot and cold, but no notable rigors. Patient able to ambulate with walker at baseline, uses wheelchair when outdoors. No noted fevers or chills. Has intermittent urine and fecal incontinence, uses diapers. No noted diarrhea or constipation, has BM every 2-3 days, last one was Tuesday or Wednesday. Denies any changes in BM or urine output. No noted orthopnea but per aide, they never lay patient flat on her back. Noted that patient has had a cough for about 5 weeks that worsened yesterday, productive of whitish sputum. Patient does not have a known history of afib per family, nor a history of asthma/COPD or other lung problems. Aide has noticed that patient coughs when she eats or drinks too quickly.    In ED vitals: T 98.6 (Tm 102.5 rectally), , /98, RR 22, O2 96% on 2L.   Labs notable for leukocytosis of 21.97, elevated pro-BNP 77758, and elevated lacate 3.4->2.7.  Given tylenol 1g, zosyn, vanc, 500cc NS x2. (01 Nov 2019 17:28)      PAST MEDICAL & SURGICAL HISTORY:  Dementia  History of hip surgery      MEDICATIONS  (STANDING):  albuterol/ipratropium for Nebulization 3 milliLiter(s) Nebulizer every 6 hours  amoxicillin  875 milliGRAM(s)/clavulanate 1 Tablet(s) Oral two times a day  chlorhexidine 0.12% Liquid 15 milliLiter(s) Oral Mucosa two times a day  ciprofloxacin     Tablet 500 milliGRAM(s) Oral every 12 hours  dextrose 5%. 1000 milliLiter(s) (50 mL/Hr) IV Continuous <Continuous>  enoxaparin Injectable 40 milliGRAM(s) SubCutaneous daily  influenza   Vaccine 0.5 milliLiter(s) IntraMuscular once  senna 2 Tablet(s) Oral at bedtime  sodium chloride 3%  Inhalation 3 milliLiter(s) Inhalation three times a day    MEDICATIONS  (PRN):  acetaminophen   Tablet .. 650 milliGRAM(s) Oral every 6 hours PRN Temp greater or equal to 38C (100.4F), Mild Pain (1 - 3), Moderate Pain (4 - 6), Severe Pain (7 - 10)      Allergies    No Known Allergies    Intolerances        FAMILY HISTORY:  No pertinent family history in first degree relatives      SOCIAL HISTORY: Son accompanies patient daily (Ever Schrader)    Last Dental Visit: Patient has not seen dentist "in a while" because of accessibility issues (stairs)    Vital Signs Last 24 Hrs  T(C): 36.7 (17 Nov 2019 05:02), Max: 36.9 (16 Nov 2019 21:45)  T(F): 98 (17 Nov 2019 05:02), Max: 98.4 (16 Nov 2019 21:45)  HR: 82 (17 Nov 2019 09:48) (80 - 89)  BP: 115/72 (17 Nov 2019 05:02) (111/78 - 117/77)  BP(mean): --  RR: 18 (17 Nov 2019 05:02) (18 - 18)  SpO2: 96% (17 Nov 2019 09:48) (93% - 98%)    EOE:  TMJ (  - ) clicks                    (  -  ) pops                    (  -  ) crepitus             Mandible <<FROM>>             Facial bones and MOM <<grossly intact>>             (  - ) trismus             (  - ) LAD             (  - ) swelling             (  - ) asymmetry             (  - ) palpation             (  - ) SOB             (  - ) dysphagia             (  - ) LOC    EOE WNL.     IOE:  permanent dentition: grossly intact with multiple restorations. Upper removable appliance.            hard/soft palate:  ( - ) palatal torus           tongue/FOM <<WNL>>           labial/buccal mucosa WNL        LABS:                        10.5   8.17  )-----------( 313      ( 17 Nov 2019 05:58 )             35.3           WBC Count: 8.17 K/uL [3.8 - 10.5] (11-17 @ 05:58)  Platelet Count - Automated: 313 K/uL [150 - 400] (11-17 @ 05:58)        DENTAL RADIOGRAPHS: TBD.     RADIOLOGY & ADDITIONAL STUDIES:    ASSESSMENT: Implant #28 attached to dislodged bridge #28-30. "Extraction" site #28 moderately inflamed. Tooth #30 is missing restoration.  Patient needs further work up to determine definitive treatment. Ideally, follow up would occur at the provider who placed the restoration in question (implant #28, bridge #28-30) however, son is adamant that mother is seen for further evaluation/definitive treatment in hospital.     PROCEDURE:  Limited bedside exam.     RECOMMENDATIONS:  1) Peridex MW 2x/day. Delivery via monojet syringe and immediate suction.   2) Dental F/U with outpatient dentist for comprehensive dental care however, patient's son adamant to be seen in hospital clinic. MD team and dental to coordinate appointment. Prior to appointment, MD team must provide medical clearance. See above section "PLAN" for criteria.   3) If any difficulty swallowing/breathing, fever occur, page dental.     Maria E Mccray DDS [id number]  Dental pager #[phone]

## 2019-11-17 NOTE — PROGRESS NOTE ADULT - PROBLEM SELECTOR PLAN 1
Incidental liver mass found on CT chest to evaluate hypoxia. CT AP (11/8) showed enlarged gallbladder with mural wall thickening, evidence of perforation. leukocytosis resolved.   -dc Zosyn 3.375g q8h /(11/1- 11/15)  -Continue with pleasure feeds as per discussion with son  -started PO Abx, cipro and augmentin 11/15

## 2019-11-18 PROCEDURE — 99233 SBSQ HOSP IP/OBS HIGH 50: CPT | Mod: GC

## 2019-11-18 RX ADMIN — Medication 3 MILLILITER(S): at 21:50

## 2019-11-18 RX ADMIN — Medication 3 MILLILITER(S): at 03:54

## 2019-11-18 RX ADMIN — Medication 500 MILLIGRAM(S): at 18:44

## 2019-11-18 RX ADMIN — Medication 1 TABLET(S): at 06:47

## 2019-11-18 RX ADMIN — SODIUM CHLORIDE 3 MILLILITER(S): 9 INJECTION INTRAMUSCULAR; INTRAVENOUS; SUBCUTANEOUS at 16:16

## 2019-11-18 RX ADMIN — CHLORHEXIDINE GLUCONATE 15 MILLILITER(S): 213 SOLUTION TOPICAL at 18:43

## 2019-11-18 RX ADMIN — SODIUM CHLORIDE 3 MILLILITER(S): 9 INJECTION INTRAMUSCULAR; INTRAVENOUS; SUBCUTANEOUS at 22:01

## 2019-11-18 RX ADMIN — SODIUM CHLORIDE 3 MILLILITER(S): 9 INJECTION INTRAMUSCULAR; INTRAVENOUS; SUBCUTANEOUS at 07:47

## 2019-11-18 RX ADMIN — Medication 1 TABLET(S): at 18:44

## 2019-11-18 RX ADMIN — Medication 3 MILLILITER(S): at 11:08

## 2019-11-18 RX ADMIN — Medication 500 MILLIGRAM(S): at 06:47

## 2019-11-18 RX ADMIN — ENOXAPARIN SODIUM 40 MILLIGRAM(S): 100 INJECTION SUBCUTANEOUS at 11:52

## 2019-11-18 RX ADMIN — CHLORHEXIDINE GLUCONATE 15 MILLILITER(S): 213 SOLUTION TOPICAL at 06:48

## 2019-11-18 RX ADMIN — Medication 3 MILLILITER(S): at 16:28

## 2019-11-18 NOTE — PROGRESS NOTE ADULT - SUBJECTIVE AND OBJECTIVE BOX
Alise Modi (Malachi) PGY-1  Pager: NS) [phone]/ (ZOJ) [phone]    Patient is a 89y old  Female who presents with a chief complaint of SOB (17 Nov 2019 14:49)      SUBJECTIVE / OVERNIGHT EVENTS:  No acute events overnight. patient states she feels well.       MEDICATIONS  (STANDING):  albuterol/ipratropium for Nebulization 3 milliLiter(s) Nebulizer every 6 hours  amoxicillin  875 milliGRAM(s)/clavulanate 1 Tablet(s) Oral two times a day  chlorhexidine 0.12% Liquid 15 milliLiter(s) Oral Mucosa two times a day  ciprofloxacin     Tablet 500 milliGRAM(s) Oral every 12 hours  dextrose 5%. 1000 milliLiter(s) (50 mL/Hr) IV Continuous <Continuous>  enoxaparin Injectable 40 milliGRAM(s) SubCutaneous daily  influenza   Vaccine 0.5 milliLiter(s) IntraMuscular once  senna 2 Tablet(s) Oral at bedtime  sodium chloride 3%  Inhalation 3 milliLiter(s) Inhalation three times a day    MEDICATIONS  (PRN):  acetaminophen   Tablet .. 650 milliGRAM(s) Oral every 6 hours PRN Temp greater or equal to 38C (100.4F), Mild Pain (1 - 3), Moderate Pain (4 - 6), Severe Pain (7 - 10)          OBJECTIVE:  Vital Signs Last 24 Hrs  T(C): 36.9 (18 Nov 2019 06:43), Max: 36.9 (18 Nov 2019 06:43)  T(F): 98.4 (18 Nov 2019 06:43), Max: 98.4 (18 Nov 2019 06:43)  HR: 84 (18 Nov 2019 07:47) (60 - 92)  BP: 135/76 (18 Nov 2019 06:43) (131/95 - 142/91)  BP(mean): --  RR: 17 (18 Nov 2019 06:43) (17 - 18)  SpO2: 95% (18 Nov 2019 07:47) (93% - 97%)    PHYSICAL EXAM:  GENERAL: NAD, well-developed  HEAD:  Atraumatic, Normocephalic  EYES: conjunctiva and sclera clear  NECK: Supple, No JVD  CHEST/LUNG: Clear to auscultation right lung. decreased BS appreciated left lower lung; No wheeze  CARDIOVASCULAR: Irregularly irregular rhythm, normal S1 and S2, with 2/6 systolic murmur;   ABDOMEN: Nontender to palpation, normoactive bowel sounds, no rebound/guarding; No hepatosplenomegaly  MSK: no clubbing or cyanosis of digits; no joint swelling or tenderness to palpation.   Extremities: No lower extremity edema.  PSYCH: AAOx1 to person; affect appropriate    CAPILLARY BLOOD GLUCOSE        I&O's Summary    17 Nov 2019 07:01  -  18 Nov 2019 07:00  --------------------------------------------------------  IN: 0 mL / OUT: 750 mL / NET: -750 mL              LABS:                        10.5   8.17  )-----------( 313      ( 17 Nov 2019 05:58 )             35.3                       RADIOLOGY & ADDITIONAL TESTS:

## 2019-11-18 NOTE — GOALS OF CARE CONVERSATION - ADVANCED CARE PLANNING - CONVERSATION DETAILS
[facility]    HCN RN had another meeting with pt's son, Jarret Schrader. He requested a review of hospice care and services.  Reviewed home hospice care services. Good understanding was expressed.    Mr Schrader stated his preferred discharge plan is Home Care with home PT.    It was explained once Home Care discharges pt from their services, he can call [facility] to request home evaluation for hospice care to initiate a home hospice plan of care.  Mr Schrader has a folder of information, contact tel # and is aware he can call [facility] at any time.      apprised of the information above.

## 2019-11-18 NOTE — PROGRESS NOTE ADULT - ASSESSMENT
89 yo F with PMH of dementia (AAOx2 at baseline) presenting with weakness and SOB x 1 day, found to have positive UA, in A fib with RVR, admitted for sepsis likely 2/2 aspiration PNA, course c/b incidentally found perforated cholecystitis pending discharge to home hospice.

## 2019-11-19 PROCEDURE — 99233 SBSQ HOSP IP/OBS HIGH 50: CPT | Mod: GC

## 2019-11-19 RX ORDER — CIPROFLOXACIN LACTATE 400MG/40ML
1 VIAL (ML) INTRAVENOUS
Qty: 14 | Refills: 0
Start: 2019-11-19 | End: 2019-11-25

## 2019-11-19 RX ADMIN — Medication 3 MILLILITER(S): at 15:55

## 2019-11-19 RX ADMIN — Medication 500 MILLIGRAM(S): at 06:40

## 2019-11-19 RX ADMIN — CHLORHEXIDINE GLUCONATE 15 MILLILITER(S): 213 SOLUTION TOPICAL at 06:40

## 2019-11-19 RX ADMIN — SODIUM CHLORIDE 3 MILLILITER(S): 9 INJECTION INTRAMUSCULAR; INTRAVENOUS; SUBCUTANEOUS at 10:18

## 2019-11-19 RX ADMIN — Medication 1 TABLET(S): at 06:40

## 2019-11-19 RX ADMIN — Medication 3 MILLILITER(S): at 03:11

## 2019-11-19 RX ADMIN — Medication 3 MILLILITER(S): at 22:30

## 2019-11-19 RX ADMIN — SODIUM CHLORIDE 3 MILLILITER(S): 9 INJECTION INTRAMUSCULAR; INTRAVENOUS; SUBCUTANEOUS at 15:56

## 2019-11-19 RX ADMIN — Medication 500 MILLIGRAM(S): at 17:04

## 2019-11-19 RX ADMIN — Medication 1 TABLET(S): at 17:04

## 2019-11-19 RX ADMIN — SENNA PLUS 2 TABLET(S): 8.6 TABLET ORAL at 21:59

## 2019-11-19 RX ADMIN — ENOXAPARIN SODIUM 40 MILLIGRAM(S): 100 INJECTION SUBCUTANEOUS at 11:29

## 2019-11-19 RX ADMIN — Medication 3 MILLILITER(S): at 10:18

## 2019-11-19 RX ADMIN — SODIUM CHLORIDE 3 MILLILITER(S): 9 INJECTION INTRAMUSCULAR; INTRAVENOUS; SUBCUTANEOUS at 22:45

## 2019-11-19 NOTE — PROGRESS NOTE ADULT - PROBLEM SELECTOR PLAN 1
Incidental liver mass found on CT chest to evaluate hypoxia. CT AP (11/8) showed enlarged gallbladder with mural wall thickening, evidence of perforation. leukocytosis resolved.   -dc Zosyn 3.375g q8h /(11/1- 11/15)  -Continue with pleasure feeds as per discussion with son  -PO Abx, cipro and augmentin 11/15

## 2019-11-19 NOTE — PROGRESS NOTE ADULT - SUBJECTIVE AND OBJECTIVE BOX
Alise Modi (Malachi) PGY-1  Pager: (ns) [phone]/ (KQB) [phone]    Patient is a 89y old  Female who presents with a chief complaint of SOB (18 Nov 2019 08:49)      SUBJECTIVE / OVERNIGHT EVENTS:  No acute events overnight.       MEDICATIONS  (STANDING):  albuterol/ipratropium for Nebulization 3 milliLiter(s) Nebulizer every 6 hours  amoxicillin  875 milliGRAM(s)/clavulanate 1 Tablet(s) Oral two times a day  ciprofloxacin     Tablet 500 milliGRAM(s) Oral every 12 hours  dextrose 5%. 1000 milliLiter(s) (50 mL/Hr) IV Continuous <Continuous>  enoxaparin Injectable 40 milliGRAM(s) SubCutaneous daily  influenza   Vaccine 0.5 milliLiter(s) IntraMuscular once  senna 2 Tablet(s) Oral at bedtime  sodium chloride 3%  Inhalation 3 milliLiter(s) Inhalation three times a day    MEDICATIONS  (PRN):  acetaminophen   Tablet .. 650 milliGRAM(s) Oral every 6 hours PRN Temp greater or equal to 38C (100.4F), Mild Pain (1 - 3), Moderate Pain (4 - 6), Severe Pain (7 - 10)          OBJECTIVE:  Vital Signs Last 24 Hrs  T(C): 36.4 (19 Nov 2019 06:37), Max: 36.8 (18 Nov 2019 14:39)  T(F): 97.5 (19 Nov 2019 06:37), Max: 98.2 (18 Nov 2019 14:39)  HR: 100 (19 Nov 2019 06:37) (71 - 102)  BP: 136/88 (19 Nov 2019 06:37) (128/84 - 136/88)  BP(mean): --  RR: 18 (19 Nov 2019 06:37) (17 - 18)  SpO2: 94% (19 Nov 2019 06:37) (94% - 96%)    PHYSICAL EXAM:  GENERAL: NAD, well-developed  HEAD:  Atraumatic, Normocephalic  EYES: conjunctiva and sclera clear  NECK: Supple, No JVD  CHEST/LUNG: Clear to auscultation right lung. decreased BS appreciated left lower lung; No wheeze  CARDIOVASCULAR: Irregularly irregular rhythm, normal S1 and S2, with 2/6 systolic murmur;   ABDOMEN: Nontender to palpation, normoactive bowel sounds, no rebound/guarding; No hepatosplenomegaly  MSK: no clubbing or cyanosis of digits; no joint swelling or tenderness to palpation.   Extremities: No lower extremity edema.  PSYCH: AAOx1 to person; affect appropriate      CAPILLARY BLOOD GLUCOSE        I&O's Summary            LABS:                      RADIOLOGY & ADDITIONAL TESTS:

## 2019-11-19 NOTE — CHART NOTE - NSCHARTNOTEFT_GEN_A_CORE
Source: Patient [x] other [x] HHA at bedside. Medical record reviewed. Patient seen for length of stay nutrition follow-up. HHA reports patient consuming ~50% of pureed meals. HHA providing thickened Ensure Enlive for patient to supplement PO intake. PEr chart review s/p GOC discussions - plan for home hospice.     Current Diet : Diet, Dysphagia 1 Pureed-Honey Consistency Fluid:   Supplement Feeding Modality:  Oral  Ensure Enlive Servings Per Day:  1       Frequency:  Daily (11-14-19 @ 13:14)  PO intake:  < 50% [ ] 50-75% [x]   % [ ]  other :  Current Weight: 39.5kg (11/18), 43kg (11/15), 53.2kg (11/01)  **Noted weight decrease, ?scale accuracy    __________________ Pertinent Medications__________________   MEDICATIONS  (STANDING):  albuterol/ipratropium for Nebulization 3 milliLiter(s) Nebulizer every 6 hours  amoxicillin  875 milliGRAM(s)/clavulanate 1 Tablet(s) Oral two times a day  ciprofloxacin     Tablet 500 milliGRAM(s) Oral every 12 hours  dextrose 5%. 1000 milliLiter(s) (50 mL/Hr) IV Continuous <Continuous>  enoxaparin Injectable 40 milliGRAM(s) SubCutaneous daily  influenza   Vaccine 0.5 milliLiter(s) IntraMuscular once  senna 2 Tablet(s) Oral at bedtime  sodium chloride 3%  Inhalation 3 milliLiter(s) Inhalation three times a day    MEDICATIONS  (PRN):  acetaminophen   Tablet .. 650 milliGRAM(s) Oral every 6 hours PRN Temp greater or equal to 38C (100.4F), Mild Pain (1 - 3), Moderate Pain (4 - 6), Severe Pain (7 - 10)    __________________ Pertinent Labs__________________    11-14 Phos 2.8 mg/dL    Skin: right hand skin tear  Edema: none noted    Estimated Needs:   [x] no change since previous assessment  [ ] recalculated:     Previous Nutrition Diagnosis:   [x] Chewing/swallowing difficulty  Nutrition Diagnosis is [x] ongoing  [ ] resolved [ ] not applicable     Goal(s):  1. Patient to meet > 75% estimated pro/kcal needs.    Interventions: To align with GOC  1. Continue pleasure feeds of Dysphagia 1 Pureed Honey Consistency diet with Ensure Enlive *THICKENED to Honey Consistency** 240mls 1x daily (350 kcal, 20g protein).   2. Assist with meals, maintain aspiration precautions.     Monitoring and Evaluation:   1. Monitor weights, labs, BM's, skin integrity, p.o. intake.  Follow-up per department protocol.   RD to remain available, Georgia Burton RD, CDN - Pager #[phone]

## 2019-11-19 NOTE — PROGRESS NOTE ADULT - PROBLEM SELECTOR PLAN 5
DVT ppx: lovenox daily  Diet: pleasure feeds  Dispo: Plan is for home with hospice. accepted. Son now just wants home.     #dental implant dislodgment  -evaluated by dental, no interventions inpatient

## 2019-11-19 NOTE — PROGRESS NOTE ADULT - PROBLEM SELECTOR PLAN 2
R pleural effusion > L pleural effusion with RLL total lung collapse seen on CTA. No PE. Thoracentesis held off due to high risk  - c/w aspiration precautions  - Will coordinate outpatient oxygen requirements  - saturating 93-94% on 3L NC

## 2019-11-20 PROCEDURE — 99233 SBSQ HOSP IP/OBS HIGH 50: CPT | Mod: GC

## 2019-11-20 RX ADMIN — SODIUM CHLORIDE 3 MILLILITER(S): 9 INJECTION INTRAMUSCULAR; INTRAVENOUS; SUBCUTANEOUS at 10:15

## 2019-11-20 RX ADMIN — SODIUM CHLORIDE 3 MILLILITER(S): 9 INJECTION INTRAMUSCULAR; INTRAVENOUS; SUBCUTANEOUS at 21:47

## 2019-11-20 RX ADMIN — Medication 1 TABLET(S): at 06:04

## 2019-11-20 RX ADMIN — Medication 1 TABLET(S): at 17:35

## 2019-11-20 RX ADMIN — Medication 500 MILLIGRAM(S): at 06:03

## 2019-11-20 RX ADMIN — Medication 3 MILLILITER(S): at 04:31

## 2019-11-20 RX ADMIN — ENOXAPARIN SODIUM 40 MILLIGRAM(S): 100 INJECTION SUBCUTANEOUS at 12:31

## 2019-11-20 RX ADMIN — Medication 500 MILLIGRAM(S): at 17:35

## 2019-11-20 RX ADMIN — Medication 3 MILLILITER(S): at 16:18

## 2019-11-20 RX ADMIN — SODIUM CHLORIDE 3 MILLILITER(S): 9 INJECTION INTRAMUSCULAR; INTRAVENOUS; SUBCUTANEOUS at 16:30

## 2019-11-20 RX ADMIN — Medication 3 MILLILITER(S): at 10:07

## 2019-11-20 RX ADMIN — Medication 3 MILLILITER(S): at 21:45

## 2019-11-20 NOTE — PROGRESS NOTE ADULT - PROBLEM SELECTOR PLAN 2
R pleural effusion > L pleural effusion with RLL total lung collapse seen on CTA. No PE. Thoracentesis held off due to high risk  - c/w aspiration precautions  - saturating 93-94% on 3L NC

## 2019-11-20 NOTE — PROVIDER CONTACT NOTE (OTHER) - ACTION/TREATMENT ORDERED:
per md 88% on room air is ok if pt drop below 88% put back on supplemental O2
Aware of above orders.

## 2019-11-20 NOTE — PROGRESS NOTE ADULT - SUBJECTIVE AND OBJECTIVE BOX
Alise Modi (Malachi) PGY-1  Pager: NS) [phone]/ (CRN) [phone]    Patient is a 89y old  Female who presents with a chief complaint of SOB (19 Nov 2019 07:36)      SUBJECTIVE / OVERNIGHT EVENTS:  No acute complaints over night. Denies any fevers/chills, headache, CP, SOB, abd pain, N/V/D, constipation, or leg swelling.       MEDICATIONS  (STANDING):  albuterol/ipratropium for Nebulization 3 milliLiter(s) Nebulizer every 6 hours  amoxicillin  875 milliGRAM(s)/clavulanate 1 Tablet(s) Oral two times a day  ciprofloxacin     Tablet 500 milliGRAM(s) Oral every 12 hours  dextrose 5%. 1000 milliLiter(s) (50 mL/Hr) IV Continuous <Continuous>  enoxaparin Injectable 40 milliGRAM(s) SubCutaneous daily  influenza   Vaccine 0.5 milliLiter(s) IntraMuscular once  senna 2 Tablet(s) Oral at bedtime  sodium chloride 3%  Inhalation 3 milliLiter(s) Inhalation three times a day    MEDICATIONS  (PRN):  acetaminophen   Tablet .. 650 milliGRAM(s) Oral every 6 hours PRN Temp greater or equal to 38C (100.4F), Mild Pain (1 - 3), Moderate Pain (4 - 6), Severe Pain (7 - 10)          OBJECTIVE:  Vital Signs Last 24 Hrs  T(C): 36.6 (20 Nov 2019 06:01), Max: 37.2 (19 Nov 2019 14:36)  T(F): 97.9 (20 Nov 2019 06:01), Max: 98.9 (19 Nov 2019 14:36)  HR: 90 (20 Nov 2019 06:01) (77 - 92)  BP: 122/91 (20 Nov 2019 06:01) (122/91 - 132/89)  BP(mean): --  RR: 20 (20 Nov 2019 06:01) (17 - 20)  SpO2: 90% (20 Nov 2019 06:01) (90% - 99%)  PHYSICAL EXAM:  GENERAL: NAD, well-developed  HEAD:  Atraumatic, Normocephalic  EYES: conjunctiva and sclera clear  NECK: Supple, No JVD  CHEST/LUNG: Clear to auscultation b/l No wheeze  CARDIOVASCULAR: Irregularly irregular rhythm, normal S1 and S2, with 2/6 systolic murmur;   ABDOMEN: Nontender to palpation, normoactive bowel sounds, no rebound/guarding; No hepatosplenomegaly  MSK: no clubbing or cyanosis of digits; no joint swelling or tenderness to palpation.   Extremities: No lower extremity edema.  PSYCH: AAOx2 to person and place; affect appropriate    CAPILLARY BLOOD GLUCOSE        I&O's Summary            LABS:                      RADIOLOGY & ADDITIONAL TESTS:

## 2019-11-20 NOTE — PROGRESS NOTE ADULT - PROBLEM SELECTOR PLAN 5
DVT ppx: lovenox daily  Diet: pleasure feeds  Dispo: Plan is for home with hospice. accepted. Son now just wants home. pending bed delivery    #dental implant dislodgment  -evaluated by dental, no interventions inpatient

## 2019-11-20 NOTE — PROGRESS NOTE ADULT - PROBLEM SELECTOR PLAN 3
CHADsVASc 3. Afib likely driven by sepsis.   - ctm off AC for now; h/o GIB in past   - will hold off on rate control, HR currently stable

## 2019-11-21 ENCOUNTER — TRANSCRIPTION ENCOUNTER (OUTPATIENT)
Age: 84
End: 2019-11-21

## 2019-11-21 VITALS — OXYGEN SATURATION: 94 %

## 2019-11-21 PROCEDURE — 99239 HOSP IP/OBS DSCHRG MGMT >30: CPT

## 2019-11-21 RX ORDER — CIPROFLOXACIN LACTATE 400MG/40ML
1 VIAL (ML) INTRAVENOUS
Qty: 14 | Refills: 0
Start: 2019-11-21 | End: 2019-11-27

## 2019-11-21 RX ADMIN — Medication 3 MILLILITER(S): at 15:29

## 2019-11-21 RX ADMIN — ENOXAPARIN SODIUM 40 MILLIGRAM(S): 100 INJECTION SUBCUTANEOUS at 12:40

## 2019-11-21 RX ADMIN — Medication 3 MILLILITER(S): at 04:18

## 2019-11-21 RX ADMIN — Medication 3 MILLILITER(S): at 10:53

## 2019-11-21 RX ADMIN — SODIUM CHLORIDE 3 MILLILITER(S): 9 INJECTION INTRAMUSCULAR; INTRAVENOUS; SUBCUTANEOUS at 10:54

## 2019-11-21 RX ADMIN — SODIUM CHLORIDE 3 MILLILITER(S): 9 INJECTION INTRAMUSCULAR; INTRAVENOUS; SUBCUTANEOUS at 15:29

## 2019-11-21 RX ADMIN — Medication 1 TABLET(S): at 06:26

## 2019-11-21 RX ADMIN — Medication 500 MILLIGRAM(S): at 06:26

## 2019-11-21 RX ADMIN — INFLUENZA VIRUS VACCINE 0.5 MILLILITER(S): 15; 15; 15; 15 SUSPENSION INTRAMUSCULAR at 14:03

## 2019-11-21 RX ADMIN — Medication 500 MILLIGRAM(S): at 17:24

## 2019-11-21 RX ADMIN — Medication 1 TABLET(S): at 17:24

## 2019-11-21 NOTE — PROGRESS NOTE ADULT - PROBLEM SELECTOR PROBLEM 2
Gallbladder perforation
Hyponatremia
Hypoxia
UTI (urinary tract infection)
Hyponatremia

## 2019-11-21 NOTE — PROGRESS NOTE ADULT - SUBJECTIVE AND OBJECTIVE BOX
Alise Modi (Malachi) PGY-1  Pager: NS) [phone]/ (NKP) [phone]    Patient is a 89y old  Female who presents with a chief complaint of SOB (20 Nov 2019 07:11)      SUBJECTIVE / OVERNIGHT EVENTS:  No acute complaints over night. Denies any fevers/chills, headache, CP, SOB, abd pain, N/V/D, constipation, or leg swelling.       MEDICATIONS  (STANDING):  albuterol/ipratropium for Nebulization 3 milliLiter(s) Nebulizer every 6 hours  amoxicillin  875 milliGRAM(s)/clavulanate 1 Tablet(s) Oral two times a day  ciprofloxacin     Tablet 500 milliGRAM(s) Oral every 12 hours  dextrose 5%. 1000 milliLiter(s) (50 mL/Hr) IV Continuous <Continuous>  enoxaparin Injectable 40 milliGRAM(s) SubCutaneous daily  influenza   Vaccine 0.5 milliLiter(s) IntraMuscular once  senna 2 Tablet(s) Oral at bedtime  sodium chloride 3%  Inhalation 3 milliLiter(s) Inhalation three times a day    MEDICATIONS  (PRN):  acetaminophen   Tablet .. 650 milliGRAM(s) Oral every 6 hours PRN Temp greater or equal to 38C (100.4F), Mild Pain (1 - 3), Moderate Pain (4 - 6), Severe Pain (7 - 10)          OBJECTIVE:  Vital Signs Last 24 Hrs  T(C): 36.6 (21 Nov 2019 06:22), Max: 36.6 (20 Nov 2019 15:02)  T(F): 97.9 (21 Nov 2019 06:22), Max: 97.9 (20 Nov 2019 15:02)  HR: 68 (21 Nov 2019 06:22) (68 - 95)  BP: 135/90 (21 Nov 2019 06:22) (115/86 - 135/90)  BP(mean): --  RR: 20 (21 Nov 2019 06:22) (17 - 20)  SpO2: 96% (21 Nov 2019 06:22) (95% - 98%)  PHYSICAL EXAM:  GENERAL: NAD, well-developed  HEAD:  Atraumatic, Normocephalic  EYES: conjunctiva and sclera clear  NECK: Supple, No JVD  CHEST/LUNG: Clear to auscultation b/l No wheeze  CARDIOVASCULAR: Irregularly irregular rhythm, normal S1 and S2, with 2/6 systolic murmur;   ABDOMEN: Nontender to palpation, normoactive bowel sounds, no rebound/guarding; No hepatosplenomegaly  MSK: no clubbing or cyanosis of digits; no joint swelling or tenderness to palpation.   Extremities: No lower extremity edema.  PSYCH: AAOx2 to person and place; affect appropriate      CAPILLARY BLOOD GLUCOSE        I&O's Summary    20 Nov 2019 07:01  -  21 Nov 2019 07:00  --------------------------------------------------------  IN: 597 mL / OUT: 300 mL / NET: 297 mL              LABS:                      RADIOLOGY & ADDITIONAL TESTS:

## 2019-11-21 NOTE — DISCHARGE NOTE NURSING/CASE MANAGEMENT/SOCIAL WORK - NSDCPNINST_GEN_ALL_CORE
patient alert  at baseline and clinically stable. patient denies pain and discomfort. patient vss and afebrile. patient provided with am and oral care today at bedside. pleasure feeding provided. Skin Care: Patient on low air loss therapy bed while on admission, Turned and Positioned every two hours, positioning device used to maintain position and off load pressure, heels elevated off bed surface, and on one breathable underpad. side effects of medication management provided. pt to be d/c home with services. will continue to monitor.

## 2019-11-21 NOTE — PROGRESS NOTE ADULT - ASSESSMENT
89 yo F with PMH of dementia (AAOx2 at baseline) presenting with weakness and SOB x 1 day, found to have positive UA, in A fib with RVR, admitted for sepsis likely 2/2 aspiration PNA, course c/b incidentally found perforated cholecystitis pending discharge to home .

## 2019-11-21 NOTE — DISCHARGE NOTE NURSING/CASE MANAGEMENT/SOCIAL WORK - PATIENT PORTAL LINK FT
You can access the FollowMyHealth Patient Portal offered by [facility] by registering at the following website: [email or web address]. By joining [employer]’s FollowMyHealth portal, you will also be able to view your health information using other applications (apps) compatible with our system.

## 2019-11-21 NOTE — DISCHARGE NOTE NURSING/CASE MANAGEMENT/SOCIAL WORK - NSDCCRNAME_GEN_ALL_CORE_FT
[facility] - Ambulance - 11/21/2019 5pm  Trip #354D [facility] - Ambulance - 11/21/2019 7pm  Trip #134B

## 2019-11-21 NOTE — PROGRESS NOTE ADULT - ATTENDING COMMENTS
Discussed case with dental team, who are unable to fit patient into their inpatient schedule to be brought down to their clinic today. Patient's son unsure about how he can transport patient as an outpatient to her appts as she does not qualify for transportation to be covered by insurance. Will have SW speak with patient's son about options. Patient is medically stable for discharge once he is able to remove patient's old hospital bed and have a new one delivered
Hospital bed to be delivered today in PM. Patient unable to be seen in dental clinic as inpatient for dislodged tooth implant, multiple providers have spoken with patient's son about how to obtain outpatient dental follow up for comprehensive dental exam. D/c home today with home care, plan is to transition to home hospice in 2-3 weeks. Will continue Cipro and Augmentin for 1 more week.    45 minutes spent on discharge planning
Patient continues to do well. Per patient's son's request, will speak with dental team today regarding inpatient treatment for dislodged tooth implant. Anticipate discharge home with hospice in next 1-2 days
Patient's son coordinating to have hospital bed delivered today or tomorrow. Have been in touch with dental team to try to have patient seen in clinic prior to discharge given that it will be difficult for her to have outpatient dental follow up due to immobility
Pt feeling well without complaint. WBCs trending down. Continue current abx. Explained to pt's son that there is no firm guidance on duration of abx in setting of know GB perforation absent percutaneous drainage. Explained that pt may be ready for dc in 1-2 days with home services. Pt's son in agreement.
Pt remains asymptomatic, without concerning finding on abdominal exam. Yet, she remains high risk for recurrent aspiration pneumonia or worsening gallbladder infection. Per IR no indication for gallbladder drainage at this time. Will transition to oral abx in preparation for discharge home with services early next week.
Pt tolerating trial of po abx. Continue and recheck CBC. If any signs of recrudescence of infection (e.g., increased WBCs), will call IR to reconsider lauren kee. Continue to discuss advanced care planning with pt's son.
After discussion with IR and Surgery, pt's son leaning towards not pursuing percutaneous cholecystostomy, as having an indwelling tube would not be consistent with pt's wishes. However, if the pt were to be discharged without drainage of abd collection, it is likely she would become septic, which would be life-threatening. Even if suppressive po antibiotics were trialed on discharge, it is not clear these would be effective, both because there was no definitive bacteria cultured and because the pt's dysphagia (planning on pleasure feeds) would make administration unreliable. As such, the team will speak with the pt's son again to discuss advanced care planning, including MOLST and need for any additional palliative services at home (e.g., hospice).
After extensive discussion with son yesterday, it appears he is still optimistic that his mother might be accepted to [facility]. I explained to him this may not be possible given her dysphagia and current nutritional strategy of pleasure feeding with aspiration precautions. If [facility] is not an option, will arrange home services including home hospice (if desired -- son will speak with liaison today). For now, continue current IV abx for aspiration pneumonia and cholecystitis.
Patient continues to be hypoxic on room air today to 88% despite completing 5 day course of abx for aspiration PNA. CT angio of the chest ordered to evaluate for pulmonary edema (crackles on exam) versus PE. Patient also continues to have severe constipation despite several enemas- will attempt to disimpact today
Patient improving, reports still having nonproductive cough. Per discussion with patient's son Ever at bedside, to continue pleasure feeds at this time. Patient's son not interested in PEG placement. To complete 5 day course of Zosyn through 11/6. Give enema today for continued constipation.
Pt comfortable this morning with reassuring vitals and consistent labs. Will speak with IR today about safety and benefit of percutaneous cholecystostomy. Continue zosyn. Discussed with pt's son Ever who is in agreement with plan.
Pt responding well to PNA treatment. Continue zosyn, stop vancomycin given low suspicion of MRSA. Give additional tap water enema for constipation. Continue aspiration precautions.
Pt today feels well without complaint, alert, interactive, benign abd. Will repeat CT abd to gauge any resolution of cholecystitis. If RUQ collection persists, will consider percutaneous drainage. Discussed with pt's son who is in agreement.
Constipation improving with daily enemas. Abx to complete tomorrow. Will plan for discharge tomorrow if still having regular bowel movements.
No episodes of abdominal pain, nausea, or vomiting. Na slightly increasing. Pending IR evaluation on Monday for perc vidhya
S/p enema yesterday with bowel movement. Less crackles on exam today. Sodium level creeping up- will start on D5. Pending CT angio of the chest for hypoxemia workup.
Pt was seen and examined in AM. Pt is awake, alert, comfortable, pleasant. She said "I am feeling very well". She knows she is in the hospital, but not the year.   c/w empiric abx. Per pt's son and aide, pt improved overall  +GPC coag neg bacteremia 1/2 bottle, likely contamination. f/u repeat blood cx  h/o AS/MR. Here found Afib / significantly elevated proBNP, hemodynamically stable. Per note by Dr. Gutierrez in [employer], pt deemed not a good candidate for AC
Will need to consult IR on Monday for possible percutaneous cholecystostomy tube placement for perforated gallbladder; patient not a candidate for surgery. Tolerating diet at this time, c/w Zosyn.
CTAP pending for today for further workup of subhepatic mass seen on CT chest. Will check oxygen saturation on room air, assess for need for supplemental oxygen.

## 2019-11-21 NOTE — CHART NOTE - NSCHARTNOTEFT_GEN_A_CORE
Patient will require nocturnal O2 d/t overnight O2 showing desaturation <88% at least 5 minutes as shown in attached overnight oximetry. Patient's pneumonia has resolved. Patient was tested in a chronic stable state.     Alise Modi (Malachi) PGY-1  Pager: (NS) [phone]/ L) [phone]

## 2019-11-21 NOTE — DISCHARGE NOTE NURSING/CASE MANAGEMENT/SOCIAL WORK - NSDCDMETYPESERV_GEN_ALL_CORE_FT
Hospital bed, gel overlay  Nocturnal Oxygen (pending approval as discussed) Hospital bed, gel overlay to be delivered 11/21  Oxygen concentrator approved - a representative will be in contact to coordinate delivery

## 2019-11-21 NOTE — PROGRESS NOTE ADULT - PROBLEM SELECTOR PLAN 6
Transitions of Care Status:  1.  Name of PCP:   2.  PCP Contacted on Admission: [ ] Y    [ x] N    3.  PCP contacted at Discharge: [ ] Y    [ ] N    [ ] N/A  4.  Post-Discharge Appointment Date and Location:  5.  Summary of Handoff given to PCP: Transitions of Care Status:  1.  Name of PCP: Unknown  2.  PCP Contacted on Admission: [ ] Y    [X] N    3.  PCP contacted at Discharge: [ ] Y    [ ] N    [X] N/A- patient to be discharged home, transitioning to home hospice  4.  Post-Discharge Appointment Date and Location: N/A- patient to be discharged home, transitioning to home hospice  5.  Summary of Handoff given to PCP: N/A

## 2019-11-21 NOTE — DISCHARGE NOTE NURSING/CASE MANAGEMENT/SOCIAL WORK - NSSCNAMETXT_GEN_ALL_CORE
[facility] Home. Nurse to visit on the day following discharge. Other appropriate services to be arranged thereafter.   Please contact the home care agency at the above phone number if you have not heard from them by approximately 12 noon on the day after your hospital discharge.

## 2019-11-21 NOTE — PROGRESS NOTE ADULT - PROBLEM SELECTOR PROBLEM 1
Gallbladder perforation
Hypoxia
Sepsis
Hypoxia

## 2019-11-22 ENCOUNTER — INBOUND DOCUMENT (OUTPATIENT)
Age: 84
End: 2019-11-22

## 2019-11-22 RX ORDER — SENNOSIDES 8.6 MG TABLETS 8.6 MG/1
8.6 TABLET ORAL
Qty: 60 | Refills: 0 | Status: ACTIVE | COMMUNITY
Start: 2019-11-22

## 2019-11-22 RX ORDER — AMOXICILLIN AND CLAVULANATE POTASSIUM 875; 125 MG/1; MG/1
875-125 TABLET, COATED ORAL
Qty: 20 | Refills: 1 | Status: ACTIVE | COMMUNITY
Start: 2019-11-22

## 2019-11-22 RX ORDER — CIPROFLOXACIN HYDROCHLORIDE 500 MG/1
500 TABLET, FILM COATED ORAL TWICE DAILY
Refills: 0 | Status: ACTIVE | COMMUNITY
Start: 2019-11-22

## 2019-11-25 ENCOUNTER — APPOINTMENT (OUTPATIENT)
Dept: CARE COORDINATION | Facility: HOME HEALTH | Age: 84
End: 2019-11-25
Payer: MEDICARE

## 2019-11-25 VITALS
RESPIRATION RATE: 16 BRPM | OXYGEN SATURATION: 92 % | SYSTOLIC BLOOD PRESSURE: 110 MMHG | DIASTOLIC BLOOD PRESSURE: 70 MMHG | HEART RATE: 72 BPM

## 2019-11-25 DIAGNOSIS — I10 ESSENTIAL (PRIMARY) HYPERTENSION: ICD-10-CM

## 2019-11-25 DIAGNOSIS — R53.2 FUNCTIONAL QUADRIPLEGIA: ICD-10-CM

## 2019-11-25 DIAGNOSIS — R06.00 DYSPNEA, UNSPECIFIED: Chronic | ICD-10-CM

## 2019-11-25 DIAGNOSIS — R13.10 DYSPHAGIA, UNSPECIFIED: ICD-10-CM

## 2019-11-25 DIAGNOSIS — F03.90 UNSPECIFIED DEMENTIA W/OUT BEHAVIORAL DISTURBANCE: ICD-10-CM

## 2019-11-25 PROCEDURE — 99349 HOME/RES VST EST MOD MDM 40: CPT

## 2019-11-25 RX ORDER — FAMOTIDINE 40 MG/1
40 TABLET, FILM COATED ORAL
Refills: 0 | Status: ACTIVE | COMMUNITY
Start: 2019-11-25

## 2019-11-25 RX ORDER — PANTOPRAZOLE 40 MG/1
40 TABLET, DELAYED RELEASE ORAL
Qty: 180 | Refills: 3 | Status: DISCONTINUED | COMMUNITY
Start: 2018-05-29 | End: 2019-11-25

## 2019-11-25 NOTE — HISTORY OF PRESENT ILLNESS
[Family Member] : family member [Formal Caregiver] : formal caregiver [FreeTextEntry1] : "F/u hospitalization for PNA" Pt is homebound due to limited endurance and weakness. A&O x 1, seen in home with son Ever and private BARRIOS Beatriz present/ Pt s/p hospitalization for aspiration PNA and ruptured gallbladder now bed bound and dependent in ADLs/IADLs following hospitalization.  Caregiver reports that pt is tolerating pureed pleasure feeds, has intermittent coughing with difficulty expectorating, no reported SOB, fever.  +BM and urine output, denies abnormalities in urine, diarrhea, constipation.  Pt's skin reported without rash, breakdown. Unable to get OOB without significant assistance- has hospital bed with HOB elevated 45 degrees.  No reported headaches, dizziness, chest pain- not on BP medications. [de-identified] : Hospital Course: 89 yo F with PMH of dementia (AAOx2 at baseline) presenting with weakness and SOB x 1 day. Patient has 24/7 HHA who noticed that patient had SOB earlier today, appeared uncomfortable. She felt intermittently hot and cold, but no notable rigors. Patient able to ambulate with walker at baseline, uses wheelchair when outdoors. No noted fevers or chills. Has intermittent urine and fecal incontinence, uses diapers. No noted diarrhea or constipation, has BM every 2-3 days, last one was Tuesday or Wednesday. Denies any changes in BM or urine output. No noted orthopnea but per aide, they never lay patient flat on her back. Noted that patient has had a cough for about 5 weeks that worsened yesterday, productive of whitish sputum. Patient does not have a known history of afib per family, nor a history of asthma/COPD or other lung problems. Aide has noticed that patient coughs when she eats or drinks too quickly.\par Pt admitted for cough, found to have UTI and consolidations on CXR; course c/b AFIB, which resolved. Pt started on Zosyn to cover for possible aspiration; pt failed speech/swallow eval, but son wanted pleasure feeds - risks explained in detail in layman's terms to him. Pt received Zosyn for 5 days. Patient became hypoxic requiring supplemental oxygen after completion of antibiotics. CTA chest obtained to r/o PE and worsening pneumonia which showed a large right pleural effusion with total right lower lobe collapse and a left lower lobe effusion. An incidental liver mass was discovered on this exam, and a follow up CT of the abdomen and pelvis showed enlarged gallbladder with evidence of perforation. Patient was placed on zosyn until 11/14. IR was initially consulted which believed she was a candidate for perc vidhya, however pt's HCP, the son, did not want anything invasive. The risks and benefits not undergoing perc vidhya were explained, including worsening clinical status, sepsis, and death, to the son. The son understood and still wished for non-invasive options. IR did not recommend perc vidhya as her leukocytosis resolved. Patient has been HDS, without fevers, or worsening clinical status. Extensive discussion with family including possibility of patient going into sepsis if abx are stopped were explained. Pt's son understands and understands the risks. Patient was accepted to home hospice, however pt's son opts to take patient home as she has 2 private health aides who take excellent care of pt. Patient's son plans to transition patient to home hospice 2-3 weeks after discharge. During this admission patient's implanted tooth was dislodged, which was evaluated by dental, who recommended no inpatient interventions. Patient is HDS for discharge to home. \par

## 2019-11-25 NOTE — PHYSICAL EXAM
[No Acute Distress] : no acute distress [Cachectic] : cachexia was observed [Supple] : supple [No Respiratory Distress] : no respiratory distress  [No Accessory Muscle Use] : no accessory muscle use [Clear to Auscultation] : lungs were clear to auscultation bilaterally [Regular Rhythm] : with a regular rhythm [Normal S1, S2] : normal S1 and S2 [Normal Rate] : normal rate  [No Edema] : there was no peripheral edema [Soft] : abdomen soft [Non Tender] : non-tender [Normal Bowel Sounds] : normal bowel sounds [Non-distended] : non-distended [de-identified] : 4/6 murmur [No Rash] : no rash [de-identified] : A&O x 1

## 2019-11-26 PROBLEM — R06.00 DYSPNEA: Chronic | Status: ACTIVE | Noted: 2019-11-26

## 2019-11-27 ENCOUNTER — INPATIENT (INPATIENT)
Facility: HOSPITAL | Age: 84
LOS: 3 days | End: 2019-12-01
Attending: HOSPITALIST | Admitting: HOSPITALIST
Payer: MEDICARE

## 2019-11-27 VITALS
HEART RATE: 130 BPM | DIASTOLIC BLOOD PRESSURE: 89 MMHG | TEMPERATURE: 99 F | SYSTOLIC BLOOD PRESSURE: 150 MMHG | OXYGEN SATURATION: 95 % | RESPIRATION RATE: 26 BRPM

## 2019-11-27 DIAGNOSIS — J18.9 PNEUMONIA, UNSPECIFIED ORGANISM: ICD-10-CM

## 2019-11-27 DIAGNOSIS — J96.01 ACUTE RESPIRATORY FAILURE WITH HYPOXIA: ICD-10-CM

## 2019-11-27 DIAGNOSIS — Z98.890 OTHER SPECIFIED POSTPROCEDURAL STATES: Chronic | ICD-10-CM

## 2019-11-27 DIAGNOSIS — Z02.9 ENCOUNTER FOR ADMINISTRATIVE EXAMINATIONS, UNSPECIFIED: ICD-10-CM

## 2019-11-27 DIAGNOSIS — Z29.9 ENCOUNTER FOR PROPHYLACTIC MEASURES, UNSPECIFIED: ICD-10-CM

## 2019-11-27 DIAGNOSIS — E87.0 HYPEROSMOLALITY AND HYPERNATREMIA: ICD-10-CM

## 2019-11-27 DIAGNOSIS — A41.9 SEPSIS, UNSPECIFIED ORGANISM: ICD-10-CM

## 2019-11-27 DIAGNOSIS — I48.0 PAROXYSMAL ATRIAL FIBRILLATION: ICD-10-CM

## 2019-11-27 LAB
ALBUMIN SERPL ELPH-MCNC: 3.4 G/DL — SIGNIFICANT CHANGE UP (ref 3.3–5)
ALP SERPL-CCNC: 69 U/L — SIGNIFICANT CHANGE UP (ref 40–120)
ALT FLD-CCNC: 24 U/L — SIGNIFICANT CHANGE UP (ref 4–33)
ANION GAP SERPL CALC-SCNC: 11 MMO/L — SIGNIFICANT CHANGE UP (ref 7–14)
ANION GAP SERPL CALC-SCNC: 13 MMO/L — SIGNIFICANT CHANGE UP (ref 7–14)
ANISOCYTOSIS BLD QL: SLIGHT — SIGNIFICANT CHANGE UP
APPEARANCE UR: CLEAR — SIGNIFICANT CHANGE UP
APTT BLD: 29.2 SEC — SIGNIFICANT CHANGE UP (ref 27.5–36.3)
AST SERPL-CCNC: 33 U/L — HIGH (ref 4–32)
B PERT DNA SPEC QL NAA+PROBE: NOT DETECTED — SIGNIFICANT CHANGE UP
BACTERIA # UR AUTO: SIGNIFICANT CHANGE UP
BASE EXCESS BLDV CALC-SCNC: 7.3 MMOL/L — SIGNIFICANT CHANGE UP
BASE EXCESS BLDV CALC-SCNC: 8.6 MMOL/L — SIGNIFICANT CHANGE UP
BASOPHILS # BLD AUTO: 0.05 K/UL — SIGNIFICANT CHANGE UP (ref 0–0.2)
BASOPHILS NFR BLD AUTO: 0.4 % — SIGNIFICANT CHANGE UP (ref 0–2)
BILIRUB SERPL-MCNC: 0.9 MG/DL — SIGNIFICANT CHANGE UP (ref 0.2–1.2)
BILIRUB UR-MCNC: NEGATIVE — SIGNIFICANT CHANGE UP
BLOOD GAS VENOUS - CREATININE: 0.46 MG/DL — LOW (ref 0.5–1.3)
BLOOD GAS VENOUS - CREATININE: SIGNIFICANT CHANGE UP MG/DL (ref 0.5–1.3)
BLOOD GAS VENOUS - FIO2: 21 — SIGNIFICANT CHANGE UP
BLOOD GAS VENOUS - FIO2: 21 — SIGNIFICANT CHANGE UP
BLOOD UR QL VISUAL: SIGNIFICANT CHANGE UP
BUN SERPL-MCNC: 22 MG/DL — SIGNIFICANT CHANGE UP (ref 7–23)
BUN SERPL-MCNC: 23 MG/DL — SIGNIFICANT CHANGE UP (ref 7–23)
C PNEUM DNA SPEC QL NAA+PROBE: NOT DETECTED — SIGNIFICANT CHANGE UP
CALCIUM SERPL-MCNC: 9.3 MG/DL — SIGNIFICANT CHANGE UP (ref 8.4–10.5)
CALCIUM SERPL-MCNC: 9.6 MG/DL — SIGNIFICANT CHANGE UP (ref 8.4–10.5)
CHLORIDE BLDV-SCNC: 116 MMOL/L — HIGH (ref 96–108)
CHLORIDE BLDV-SCNC: 119 MMOL/L — HIGH (ref 96–108)
CHLORIDE SERPL-SCNC: 115 MMOL/L — HIGH (ref 98–107)
CHLORIDE SERPL-SCNC: 116 MMOL/L — HIGH (ref 98–107)
CHLORIDE UR-SCNC: 43 MMOL/L — SIGNIFICANT CHANGE UP
CO2 SERPL-SCNC: 31 MMOL/L — SIGNIFICANT CHANGE UP (ref 22–31)
CO2 SERPL-SCNC: 34 MMOL/L — HIGH (ref 22–31)
COLOR SPEC: YELLOW — SIGNIFICANT CHANGE UP
CREAT SERPL-MCNC: 0.51 MG/DL — SIGNIFICANT CHANGE UP (ref 0.5–1.3)
CREAT SERPL-MCNC: 0.58 MG/DL — SIGNIFICANT CHANGE UP (ref 0.5–1.3)
EOSINOPHIL # BLD AUTO: 0 K/UL — SIGNIFICANT CHANGE UP (ref 0–0.5)
EOSINOPHIL NFR BLD AUTO: 0 % — SIGNIFICANT CHANGE UP (ref 0–6)
FLUAV H1 2009 PAND RNA SPEC QL NAA+PROBE: NOT DETECTED — SIGNIFICANT CHANGE UP
FLUAV H1 RNA SPEC QL NAA+PROBE: NOT DETECTED — SIGNIFICANT CHANGE UP
FLUAV H3 RNA SPEC QL NAA+PROBE: NOT DETECTED — SIGNIFICANT CHANGE UP
FLUAV SUBTYP SPEC NAA+PROBE: NOT DETECTED — SIGNIFICANT CHANGE UP
FLUBV RNA SPEC QL NAA+PROBE: NOT DETECTED — SIGNIFICANT CHANGE UP
GAS PNL BLDV: 157 MMOL/L — HIGH (ref 136–146)
GAS PNL BLDV: 161 MMOL/L — CRITICAL HIGH (ref 136–146)
GLUCOSE BLDV-MCNC: 124 MG/DL — HIGH (ref 70–99)
GLUCOSE BLDV-MCNC: 151 MG/DL — HIGH (ref 70–99)
GLUCOSE SERPL-MCNC: 125 MG/DL — HIGH (ref 70–99)
GLUCOSE SERPL-MCNC: 150 MG/DL — HIGH (ref 70–99)
GLUCOSE UR-MCNC: NEGATIVE — SIGNIFICANT CHANGE UP
HADV DNA SPEC QL NAA+PROBE: NOT DETECTED — SIGNIFICANT CHANGE UP
HCO3 BLDV-SCNC: 30 MMOL/L — HIGH (ref 20–27)
HCO3 BLDV-SCNC: 31 MMOL/L — HIGH (ref 20–27)
HCOV PNL SPEC NAA+PROBE: SIGNIFICANT CHANGE UP
HCT VFR BLD CALC: 43.7 % — SIGNIFICANT CHANGE UP (ref 34.5–45)
HCT VFR BLDV CALC: 24.3 % — LOW (ref 34.5–45)
HCT VFR BLDV CALC: 39.7 % — SIGNIFICANT CHANGE UP (ref 34.5–45)
HGB BLD-MCNC: 12.6 G/DL — SIGNIFICANT CHANGE UP (ref 11.5–15.5)
HGB BLDV-MCNC: 12.9 G/DL — SIGNIFICANT CHANGE UP (ref 11.5–15.5)
HGB BLDV-MCNC: 7.8 G/DL — LOW (ref 11.5–15.5)
HMPV RNA SPEC QL NAA+PROBE: NOT DETECTED — SIGNIFICANT CHANGE UP
HPIV1 RNA SPEC QL NAA+PROBE: NOT DETECTED — SIGNIFICANT CHANGE UP
HPIV2 RNA SPEC QL NAA+PROBE: NOT DETECTED — SIGNIFICANT CHANGE UP
HPIV3 RNA SPEC QL NAA+PROBE: NOT DETECTED — SIGNIFICANT CHANGE UP
HPIV4 RNA SPEC QL NAA+PROBE: NOT DETECTED — SIGNIFICANT CHANGE UP
HYALINE CASTS # UR AUTO: SIGNIFICANT CHANGE UP
IMM GRANULOCYTES NFR BLD AUTO: 0.7 % — SIGNIFICANT CHANGE UP (ref 0–1.5)
INR BLD: 1.2 — HIGH (ref 0.88–1.17)
KETONES UR-MCNC: NEGATIVE — SIGNIFICANT CHANGE UP
LACTATE BLDV-MCNC: 2 MMOL/L — SIGNIFICANT CHANGE UP (ref 0.5–2)
LACTATE BLDV-MCNC: 2.3 MMOL/L — HIGH (ref 0.5–2)
LEUKOCYTE ESTERASE UR-ACNC: NEGATIVE — SIGNIFICANT CHANGE UP
LYMPHOCYTES # BLD AUTO: 1.47 K/UL — SIGNIFICANT CHANGE UP (ref 1–3.3)
LYMPHOCYTES # BLD AUTO: 10.8 % — LOW (ref 13–44)
MAGNESIUM SERPL-MCNC: 2.5 MG/DL — SIGNIFICANT CHANGE UP (ref 1.6–2.6)
MANUAL SMEAR VERIFICATION: SIGNIFICANT CHANGE UP
MCHC RBC-ENTMCNC: 28.8 % — LOW (ref 32–36)
MCHC RBC-ENTMCNC: 30.4 PG — SIGNIFICANT CHANGE UP (ref 27–34)
MCV RBC AUTO: 105.3 FL — HIGH (ref 80–100)
MONOCYTES # BLD AUTO: 0.81 K/UL — SIGNIFICANT CHANGE UP (ref 0–0.9)
MONOCYTES NFR BLD AUTO: 5.9 % — SIGNIFICANT CHANGE UP (ref 2–14)
NEUTROPHILS # BLD AUTO: 11.24 K/UL — HIGH (ref 1.8–7.4)
NEUTROPHILS NFR BLD AUTO: 82.2 % — HIGH (ref 43–77)
NITRITE UR-MCNC: NEGATIVE — SIGNIFICANT CHANGE UP
NRBC # FLD: 0 K/UL — SIGNIFICANT CHANGE UP (ref 0–0)
OSMOLALITY UR: 699 MOSMO/KG — SIGNIFICANT CHANGE UP (ref 50–1200)
PCO2 BLDV: 64 MMHG — HIGH (ref 41–51)
PCO2 BLDV: 90 MMHG — CRITICAL HIGH (ref 41–51)
PH BLDV: 7.23 PH — LOW (ref 7.32–7.43)
PH BLDV: 7.33 PH — SIGNIFICANT CHANGE UP (ref 7.32–7.43)
PH UR: 5.5 — SIGNIFICANT CHANGE UP (ref 5–8)
PLATELET # BLD AUTO: 274 K/UL — SIGNIFICANT CHANGE UP (ref 150–400)
PLATELET COUNT - ESTIMATE: NORMAL — SIGNIFICANT CHANGE UP
PMV BLD: 10.3 FL — SIGNIFICANT CHANGE UP (ref 7–13)
PO2 BLDV: 153 MMHG — HIGH (ref 35–40)
PO2 BLDV: 29 MMHG — LOW (ref 35–40)
POIKILOCYTOSIS BLD QL AUTO: SLIGHT — SIGNIFICANT CHANGE UP
POLYCHROMASIA BLD QL SMEAR: SLIGHT — SIGNIFICANT CHANGE UP
POTASSIUM BLDV-SCNC: 3.5 MMOL/L — SIGNIFICANT CHANGE UP (ref 3.4–4.5)
POTASSIUM BLDV-SCNC: 4.6 MMOL/L — HIGH (ref 3.4–4.5)
POTASSIUM SERPL-MCNC: 3.6 MMOL/L — SIGNIFICANT CHANGE UP (ref 3.5–5.3)
POTASSIUM SERPL-MCNC: 3.6 MMOL/L — SIGNIFICANT CHANGE UP (ref 3.5–5.3)
POTASSIUM SERPL-SCNC: 3.6 MMOL/L — SIGNIFICANT CHANGE UP (ref 3.5–5.3)
POTASSIUM SERPL-SCNC: 3.6 MMOL/L — SIGNIFICANT CHANGE UP (ref 3.5–5.3)
POTASSIUM UR-SCNC: 68.2 MMOL/L — SIGNIFICANT CHANGE UP
PROT SERPL-MCNC: 7.1 G/DL — SIGNIFICANT CHANGE UP (ref 6–8.3)
PROT UR-MCNC: 70 — SIGNIFICANT CHANGE UP
PROTHROM AB SERPL-ACNC: 13.7 SEC — HIGH (ref 9.8–13.1)
RBC # BLD: 4.15 M/UL — SIGNIFICANT CHANGE UP (ref 3.8–5.2)
RBC # FLD: 17 % — HIGH (ref 10.3–14.5)
RBC CASTS # UR COMP ASSIST: HIGH (ref 0–?)
RSV RNA SPEC QL NAA+PROBE: DETECTED — HIGH
RV+EV RNA SPEC QL NAA+PROBE: NOT DETECTED — SIGNIFICANT CHANGE UP
SAO2 % BLDV: 42.6 % — LOW (ref 60–85)
SAO2 % BLDV: 98.8 % — HIGH (ref 60–85)
SODIUM SERPL-SCNC: 160 MMOL/L — CRITICAL HIGH (ref 135–145)
SODIUM SERPL-SCNC: 160 MMOL/L — CRITICAL HIGH (ref 135–145)
SODIUM UR-SCNC: 32 MMOL/L — SIGNIFICANT CHANGE UP
SP GR SPEC: 1.03 — SIGNIFICANT CHANGE UP (ref 1–1.04)
SQUAMOUS # UR AUTO: SIGNIFICANT CHANGE UP
UROBILINOGEN FLD QL: SIGNIFICANT CHANGE UP
WBC # BLD: 13.66 K/UL — HIGH (ref 3.8–10.5)
WBC # FLD AUTO: 13.66 K/UL — HIGH (ref 3.8–10.5)
WBC UR QL: SIGNIFICANT CHANGE UP (ref 0–?)

## 2019-11-27 PROCEDURE — 99223 1ST HOSP IP/OBS HIGH 75: CPT

## 2019-11-27 PROCEDURE — 71045 X-RAY EXAM CHEST 1 VIEW: CPT | Mod: 26

## 2019-11-27 RX ORDER — SODIUM CHLORIDE 9 MG/ML
1000 INJECTION, SOLUTION INTRAVENOUS
Refills: 0 | Status: DISCONTINUED | OUTPATIENT
Start: 2019-11-27 | End: 2019-11-29

## 2019-11-27 RX ORDER — PIPERACILLIN AND TAZOBACTAM 4; .5 G/20ML; G/20ML
3.38 INJECTION, POWDER, LYOPHILIZED, FOR SOLUTION INTRAVENOUS ONCE
Refills: 0 | Status: COMPLETED | OUTPATIENT
Start: 2019-11-27 | End: 2019-11-27

## 2019-11-27 RX ORDER — HEPARIN SODIUM 5000 [USP'U]/ML
5000 INJECTION INTRAVENOUS; SUBCUTANEOUS EVERY 8 HOURS
Refills: 0 | Status: DISCONTINUED | OUTPATIENT
Start: 2019-11-27 | End: 2019-11-30

## 2019-11-27 RX ORDER — PIPERACILLIN AND TAZOBACTAM 4; .5 G/20ML; G/20ML
3.38 INJECTION, POWDER, LYOPHILIZED, FOR SOLUTION INTRAVENOUS EVERY 8 HOURS
Refills: 0 | Status: DISCONTINUED | OUTPATIENT
Start: 2019-11-27 | End: 2019-11-30

## 2019-11-27 RX ORDER — ASPIRIN/CALCIUM CARB/MAGNESIUM 324 MG
1 TABLET ORAL
Qty: 0 | Refills: 0 | DISCHARGE

## 2019-11-27 RX ORDER — SODIUM CHLORIDE 9 MG/ML
1000 INJECTION INTRAMUSCULAR; INTRAVENOUS; SUBCUTANEOUS ONCE
Refills: 0 | Status: COMPLETED | OUTPATIENT
Start: 2019-11-27 | End: 2019-11-27

## 2019-11-27 RX ORDER — SODIUM CHLORIDE 9 MG/ML
500 INJECTION INTRAMUSCULAR; INTRAVENOUS; SUBCUTANEOUS ONCE
Refills: 0 | Status: COMPLETED | OUTPATIENT
Start: 2019-11-27 | End: 2019-11-27

## 2019-11-27 RX ORDER — ACETAMINOPHEN 500 MG
1000 TABLET ORAL ONCE
Refills: 0 | Status: COMPLETED | OUTPATIENT
Start: 2019-11-27 | End: 2019-11-27

## 2019-11-27 RX ADMIN — Medication 1000 MILLIGRAM(S): at 18:31

## 2019-11-27 RX ADMIN — SODIUM CHLORIDE 100 MILLILITER(S): 9 INJECTION, SOLUTION INTRAVENOUS at 18:29

## 2019-11-27 RX ADMIN — PIPERACILLIN AND TAZOBACTAM 200 GRAM(S): 4; .5 INJECTION, POWDER, LYOPHILIZED, FOR SOLUTION INTRAVENOUS at 10:15

## 2019-11-27 RX ADMIN — HEPARIN SODIUM 5000 UNIT(S): 5000 INJECTION INTRAVENOUS; SUBCUTANEOUS at 22:19

## 2019-11-27 RX ADMIN — PIPERACILLIN AND TAZOBACTAM 25 GRAM(S): 4; .5 INJECTION, POWDER, LYOPHILIZED, FOR SOLUTION INTRAVENOUS at 18:30

## 2019-11-27 RX ADMIN — SODIUM CHLORIDE 1000 MILLILITER(S): 9 INJECTION INTRAMUSCULAR; INTRAVENOUS; SUBCUTANEOUS at 13:52

## 2019-11-27 RX ADMIN — SODIUM CHLORIDE 500 MILLILITER(S): 9 INJECTION INTRAMUSCULAR; INTRAVENOUS; SUBCUTANEOUS at 09:48

## 2019-11-27 RX ADMIN — Medication 400 MILLIGRAM(S): at 09:48

## 2019-11-27 NOTE — ED PROVIDER NOTE - PROGRESS NOTE DETAILS
VLADIMIR Salinas- Respiratory called to start patient on high flow. Aware and will come to ED. PA Salinas- Patient noted to be hypernatremic, CXR +PNA still. Pt was desat to 80's, high flow oxygen started current O2 96%. Hospitalist called, aware, accepted patient for hospice referral and admission for PNA. VLADIMIR Salinas- Went to obtain CT consent from patients son, Medicine team at bedside said we can cancel CT at this time.

## 2019-11-27 NOTE — ED PROVIDER NOTE - CRITICAL CARE INDICATION, MLM
patient was critically ill... Patient was critically ill with a high probability of imminent or life threatening deterioration.  Goals of care discussion with son, resp support with high flow, DNR/DNI conversation, documentation.

## 2019-11-27 NOTE — PROVIDER CONTACT NOTE (OTHER) - ASSESSMENT
Referral sent to [facility].  Spoke with Obed hospice liason at [facility] and she will follow up with son Jarret.

## 2019-11-27 NOTE — ED PROVIDER NOTE - CLINICAL SUMMARY MEDICAL DECISION MAKING FREE TEXT BOX
Patient is a 89 yo F with PMH of dementia (AAOx2 at baseline), Paroxysmal AFib, recent admission 11/01/19 for Aspiration PNA with hx b/l pleural effusions, incidentally noted to have perforated cholecystitis on recent admission dc'd on Cipro and Augmentin who presents to ED with Aide c/o increasing weakness/fatigue over past 2 days along with coughing and "gurgling". DDx- includes but not limited to; PNA, sepsis. Plan- ED sepsis protocol, CXR, labs. Gentle hydration. Pending Son's arrival to discuss ?hospice/comfort care.

## 2019-11-27 NOTE — H&P ADULT - NSHPLABSRESULTS_GEN_ALL_CORE
admission labs reviewed: leukocytosis, hypernatremia, inc INR/PT    CXR: self reviewed: b/l hazy opacification, official read appreciated    EKG: admission labs reviewed: leukocytosis, hypernatremia, inc INR/PT    CXR: self reviewed: b/l hazy opacification, official read appreciated    EKG: self reviewed: afib w/ RVR

## 2019-11-27 NOTE — ED ADULT NURSE NOTE - OBJECTIVE STATEMENT
89 female, oriented to person and pt mumbled "hospital" when asked where she was, brought in by EMS from home for increased lethargy, decreased verbal communication, fever. pt was admitted to [facility] 3 weeks ago for diagnosis of pneumonia. pt was sent home and continues to take amoxicillin and Cipro, as per HHA. pt presents tachycardic, febrile, with an oxygen saturation of 95% of 4l NC. while assessing pt, pt oxygen saturation decreased to 78. MD. SLAVA Palmer made aware, pt placed on non rebreather, and RT called to administer high flow oxygen. pt has red petechiae marks to right arm. right hand has skin tear at wrist justine. right hand and wrist has dark purple bruising. 2x3cm area of blanchable redness to mid spine area.  pt entire buttocks has blanchable redness. 2 cm Fluid filled vesicle found to pts sacral area. when palpating, pt does not seem in pain. EKG performed, CCM in place. 22g iv insert to left hand. NS infusing well. labs sent as ordered.

## 2019-11-27 NOTE — ED ADULT NURSE REASSESSMENT NOTE - NS ED NURSE REASSESS COMMENT FT1
Break coverage RN: covering pt for area RN. Pt responsive to pain. RVP collected and sent. Meds and IVF started. VERONICA Rebolledo spoke to MD Hines in regards to pts oxygenation status. pt currently saturating at 89% on high flow oxygen. Continuos pulse ox remains, CCM remains.

## 2019-11-27 NOTE — ED ADULT NURSE NOTE - NSIMPLEMENTINTERV_GEN_ALL_ED
Implemented All Fall with Harm Risk Interventions:  [place] to call system. Call bell, personal items and telephone within reach. Instruct patient to call for assistance. Room bathroom lighting operational. Non-slip footwear when patient is off stretcher. Physically safe environment: no spills, clutter or unnecessary equipment. Stretcher in lowest position, wheels locked, appropriate side rails in place. Provide visual cue, wrist band, yellow gown, etc. Monitor gait and stability. Monitor for mental status changes and reorient to person, place, and time. Review medications for side effects contributing to fall risk. Reinforce activity limits and safety measures with patient and family. Provide visual clues: red socks.

## 2019-11-27 NOTE — H&P ADULT - PROBLEM SELECTOR PLAN 4
HR<130 on monitor, will monitor off telemetry. no a/c at this point as plan to transition to hospice

## 2019-11-27 NOTE — ED PROVIDER NOTE - CARE PLAN
Principal Discharge DX:	Pneumonia  Secondary Diagnosis:	Weakness  Secondary Diagnosis:	Lethargic  Secondary Diagnosis:	Dyspnea

## 2019-11-27 NOTE — H&P ADULT - ASSESSMENT
89F h/o dementia (AAOx2 at baseline), paroxysmal AFib, who presents to ED with Aide c/o increasing weakness/fatigue over past 2 days along with coughing, inc WOB, a/f hypoxic respiratory failure and sepsis likely 2/2 aspiration pna and persistent acute cholecystitis:

## 2019-11-27 NOTE — H&P ADULT - HISTORY OF PRESENT ILLNESS
89F h/o dementia (AAOx2 at baseline), paroxysmal AFib, who presents to ED with Aide c/o increasing weakness/fatigue over past 2 days along with coughing and "gurgling".   Recent admission 11/01/19 for aspiration pna with b/l pleural effusion, failed S/S course c/b perforated cholecystitis, family elected for comfort feeds, no perch vidhya drainage and dc'd on Cipro and Augmentin with plan to possibly transition to home hospice (has 24/7 HHA). As per aide, patient normally talkative but has been more tired since yesterday. Also states since dx with PNA has had a cough but now "gurgling" when breathing. Notes Oral temp of 99.3 this AM so called EMS. As per aide, son lives [facility] [place], on his way to hospital with DNR/DNI forms. Pt ROS limited 2/2 dementia nad AMS. Pt arrives to ED in AFIB with RVR to 120's. Febrile with rectal temp of 101 F.

## 2019-11-27 NOTE — H&P ADULT - PROBLEM SELECTOR PLAN 1
-likely continuing to aspirate, failed S/S eval on last admission and family agreed to comfort feeds. family is okay with continuing comfort feeds. Molst form completed and signed. Patient is DNR/DNI, no pressors. will c/w zosyn and high flow. wean off high flow as tolerated. per son, goal will be to transition patient to home with hospice vs NH with hospice capabilities.

## 2019-11-27 NOTE — ED PROVIDER NOTE - ATTENDING CONTRIBUTION TO CARE
Attending MD Palmer.  Agree with above.   Pt is a 89 yo female with pmhx of dementia (AA& O x 2 at baseline), paroxysmal Afib, recent admiss 11/1 for asp PNA with hx of B/L pleural effusions, noted to have perforated vidhya at that visit.  D/c’d on cipro and augmentin.  Pt in ED now because aide has noted inc weakness/fatigue x2 days assoc with cough/gurgling per aid.  Pt normally talkative but more tired than usual and gurgling respirations.  PT reportedly is DNR/DNI.  Son en route with copy of DNR/DNI paperwork.  Per aid pt was to be set up with hospice since last admission but this has not been done yet.  Pt in ED now with afib with RVR to 120’s, febrile to 101 rectally.  Pt with vescicular rash to buttocks vs. blistering from sacral decub.  PT is somnolent and non-verbal in ED.  Pt's O2 sat is depressed.  Respiratory called for high flow O2.  No plan to intubate.

## 2019-11-27 NOTE — H&P ADULT - NSHPPHYSICALEXAM_GEN_ALL_CORE
Vital Signs Last 24 Hrs  T(C): 36 (11-27-19 @ 11:44), Max: 38.6 (11-27-19 @ 09:23)  T(F): 96.8 (11-27-19 @ 11:44), Max: 101.5 (11-27-19 @ 09:23)  HR: 124 (11-27-19 @ 11:44) (123 - 130)  BP: 115/71 (11-27-19 @ 11:44) (115/71 - 150/89)  BP(mean): --  RR: 19 (11-27-19 @ 11:44) (19 - 26)  SpO2: 95% (11-27-19 @ 11:44) (95% - 95%)    GENERAL: inc WOB  HEENT: MM dry  Pulm: b/l rhales  CV: irregularly irregular, S1&S2+, no m/r/g appreciated  ABDOMEN: soft, nt, nd  MSK: unable to assess ROM 2/2 AMS  EXTREMITIES:  no appreciable edema in b/l LE  Neuro: A&Ox0  SKIN: warm and dry, no visible rash Vital Signs Last 24 Hrs  T(C): 36 (11-27-19 @ 11:44), Max: 38.6 (11-27-19 @ 09:23)  T(F): 96.8 (11-27-19 @ 11:44), Max: 101.5 (11-27-19 @ 09:23)  HR: 124 (11-27-19 @ 11:44) (123 - 130)  BP: 115/71 (11-27-19 @ 11:44) (115/71 - 150/89)  BP(mean): --  RR: 19 (11-27-19 @ 11:44) (19 - 26)  SpO2: 95% (11-27-19 @ 11:44) (95% - 95%)    GENERAL: inc WOB  HEENT: MM dry  Pulm: b/l rhales  CV: irregularly irregular, S1&S2+, no m/r/g appreciated  ABDOMEN: soft, nt, RUQ tenderness, no rebound or guarding  MSK: unable to assess ROM 2/2 AMS  EXTREMITIES:  no appreciable edema in b/l LE  Neuro: A&Ox0  SKIN: warm and dry, no visible rash

## 2019-11-27 NOTE — H&P ADULT - PROBLEM SELECTOR PLAN 2
suspect 2/2 aspiration pna and persitent acute vidhya   -will c/w zosyn, f/u blood and urine cultures, tylenol for fever, c/w IVF   -discussed repeat imaging and IR eval for perc vidhya with son. due continued aspiration, hypoxic respiratory failure and poor prognosis, he agrees to defer further w/u and intervention of acute vidhya. son requests we continue abx, IVF and transition to hospice care  -has stage 1 sacral decub and will order nurse wound care assessment suspect 2/2 aspiration pna and persitent acute vidhya   -will c/w zosyn, f/u blood and urine cultures, pending RVP, tylenol for fever, c/w IVF   -discussed repeat imaging and IR eval for perc vidhya with son. due continued aspiration, hypoxic respiratory failure and poor prognosis, he agrees to defer further w/u and intervention of acute vidhya. son requests we continue abx, IVF and transition to hospice care  -has stage 1 sacral decub and will order nurse wound care assessment suspect 2/2 aspiration pna and persitent acute vidhya   -will c/w zosyn, f/u blood and urine cultures, pending RVP, tylenol for fever, c/w IVF   -discussed repeat imaging and IR eval for perc vidhya with son. due to risk for aspiration, hypoxic respiratory failure and poor prognosis, he agrees to defer repeat imaging of gall bladder and evaluation for perc vidhya drain. he agrees to continue w/ abx, IVF with eventual transition to hospice care  -has stage 1 sacral decub and will order nurse wound care assessment

## 2019-11-27 NOTE — H&P ADULT - PROBLEM SELECTOR PLAN 5
dvt ppx: HSQ   diet: dysphagia 1- comfort feeds  dispo: pending transition to hospice dvt ppx: HSQ   diet: dysphagia 1- comfort feeds  dispo: pending transition to hospice  GOC: DNR/DNI, no pressors

## 2019-11-27 NOTE — H&P ADULT - PROBLEM SELECTOR PLAN 6
Transitions of Care Status:  1.  Name of PCP: Dr. Sae Gutierrez- office closed  2.  PCP Contacted on Admission: [ ] Y    [x ] N    3.  PCP contacted at Discharge: [ ] Y    [ ] N    [ ] N/A  4.  Post-Discharge Appointment Date and Location:  5.  Summary of Handoff given to PCP:

## 2019-11-27 NOTE — ED PROVIDER NOTE - OBJECTIVE STATEMENT
HPI: Patient is a 89 yo F with PMH of dementia (AAOx2 at baseline), Paroxysmal AFib, recent admission 11/01/19 for Aspiration PNA with hx b/l pleural effusions, incidentally noted to have perforated cholecystitis dc'd on Cipro and Augmentin who presents to ED with Aide c/o increasing weakness/fatigue over past 2 days along with coughing and "gurgling". As per aide, patient normally talkative but has been more tired since yesterday. Also states since dx with PNA has had a cough but now "gurgling" when breathing. Notes Oral temp of 99.3 this AM so called EMS. As per aide, son lives [email or web address] on his way to hospital with DNR/DNI forms. Pt suppose to be set up for hospice following recent DC on 11/21/19. Pt ROS limited due to condition. Pt arrives to ED in AFIB with RVR to 120's. Febrile with rectal temp of 101 F. HPI: Patient is a 89 yo F with PMH of dementia (AAOx2 at baseline), Paroxysmal AFib, recent admission 11/01/19 for Aspiration PNA with hx b/l pleural effusions, incidentally noted to have perforated cholecystitis on recent admission dc'd on Cipro and Augmentin who presents to ED with Aide c/o increasing weakness/fatigue over past 2 days along with coughing and "gurgling". As per aide, patient normally talkative but has been more tired since yesterday. Also states since dx with PNA has had a cough but now "gurgling" when breathing. Notes Oral temp of 99.3 this AM so called EMS. As per aide, son lives [facility], on his way to hospital with DNR/DNI forms. Pt suppose to be set up for hospice following recent DC on 11/21/19. Pt ROS limited due to condition. Pt arrives to ED in AFIB with RVR to 120's. Febrile with rectal temp of 101 F.

## 2019-11-27 NOTE — ED PROVIDER NOTE - CRTICAL CARE TIME SPENT (MIN)
Pt's MAP is currently 73mmHg. Lungs are clear anteriorly to auscultation.  Will give additional 250cc x1.   Monitor volume status.   Check strict I&Os. 80

## 2019-11-27 NOTE — ED PROVIDER NOTE - PHYSICAL EXAMINATION
Vital signs reviewed.   CONSTITUTIONAL:   Non-toxic appearing.   HEAD: Normocephalic, atraumatic.  EYES: PERRL, EOM intact, conjunctiva and sclera WNL.  ENT: normal nose; no rhinorrhea; normal,   NECK/LYMPH: Trachea midline   CARD: Irregular Heart sounds   RESP: Normal chest excursion with respiration; +rales noted LLL base.   ABD/GI: soft, non-distended; non-tender;  EXT/MS: moves all extremities;   SKIN: Normal for age and race; warm; dry; good turgor; +mild erythema noted to buttock region, few vesicular lesions noted to Rt buttock region proximally near crease.   NEURO: Awake, Responds to verbal commands, no gross deficits  PSYCH: Normal mood; appropriate affect.

## 2019-11-27 NOTE — ED PROVIDER NOTE - CRITICAL CARE PROVIDED
documentation/additional history taking/consultation with other physicians/conducted a detailed discussion of DNR status

## 2019-11-28 LAB
ANION GAP SERPL CALC-SCNC: 8 MMO/L — SIGNIFICANT CHANGE UP (ref 7–14)
BACTERIA UR CULT: SIGNIFICANT CHANGE UP
BUN SERPL-MCNC: 18 MG/DL — SIGNIFICANT CHANGE UP (ref 7–23)
CALCIUM SERPL-MCNC: 8.8 MG/DL — SIGNIFICANT CHANGE UP (ref 8.4–10.5)
CHLORIDE SERPL-SCNC: 116 MMOL/L — HIGH (ref 98–107)
CO2 SERPL-SCNC: 31 MMOL/L — SIGNIFICANT CHANGE UP (ref 22–31)
CREAT SERPL-MCNC: 0.43 MG/DL — LOW (ref 0.5–1.3)
GLUCOSE SERPL-MCNC: 179 MG/DL — HIGH (ref 70–99)
HCT VFR BLD CALC: 40.2 % — SIGNIFICANT CHANGE UP (ref 34.5–45)
HGB BLD-MCNC: 11.1 G/DL — LOW (ref 11.5–15.5)
MAGNESIUM SERPL-MCNC: 2.1 MG/DL — SIGNIFICANT CHANGE UP (ref 1.6–2.6)
MCHC RBC-ENTMCNC: 27.6 % — LOW (ref 32–36)
MCHC RBC-ENTMCNC: 29.3 PG — SIGNIFICANT CHANGE UP (ref 27–34)
MCV RBC AUTO: 106.1 FL — HIGH (ref 80–100)
NRBC # FLD: 0 K/UL — SIGNIFICANT CHANGE UP (ref 0–0)
PLATELET # BLD AUTO: 175 K/UL — SIGNIFICANT CHANGE UP (ref 150–400)
PMV BLD: 11.6 FL — SIGNIFICANT CHANGE UP (ref 7–13)
POTASSIUM SERPL-MCNC: 2.7 MMOL/L — CRITICAL LOW (ref 3.5–5.3)
POTASSIUM SERPL-SCNC: 2.7 MMOL/L — CRITICAL LOW (ref 3.5–5.3)
RBC # BLD: 3.79 M/UL — LOW (ref 3.8–5.2)
RBC # FLD: 16.2 % — HIGH (ref 10.3–14.5)
SODIUM SERPL-SCNC: 155 MMOL/L — HIGH (ref 135–145)
SPECIMEN SOURCE: SIGNIFICANT CHANGE UP
WBC # BLD: 16.93 K/UL — HIGH (ref 3.8–10.5)
WBC # FLD AUTO: 16.93 K/UL — HIGH (ref 3.8–10.5)

## 2019-11-28 PROCEDURE — 99233 SBSQ HOSP IP/OBS HIGH 50: CPT | Mod: GC

## 2019-11-28 RX ORDER — POTASSIUM CHLORIDE 20 MEQ
10 PACKET (EA) ORAL
Refills: 0 | Status: COMPLETED | OUTPATIENT
Start: 2019-11-28 | End: 2019-11-28

## 2019-11-28 RX ADMIN — SODIUM CHLORIDE 100 MILLILITER(S): 9 INJECTION, SOLUTION INTRAVENOUS at 06:34

## 2019-11-28 RX ADMIN — HEPARIN SODIUM 5000 UNIT(S): 5000 INJECTION INTRAVENOUS; SUBCUTANEOUS at 15:00

## 2019-11-28 RX ADMIN — Medication 100 MILLIEQUIVALENT(S): at 20:35

## 2019-11-28 RX ADMIN — HEPARIN SODIUM 5000 UNIT(S): 5000 INJECTION INTRAVENOUS; SUBCUTANEOUS at 22:18

## 2019-11-28 RX ADMIN — PIPERACILLIN AND TAZOBACTAM 25 GRAM(S): 4; .5 INJECTION, POWDER, LYOPHILIZED, FOR SOLUTION INTRAVENOUS at 08:30

## 2019-11-28 RX ADMIN — PIPERACILLIN AND TAZOBACTAM 25 GRAM(S): 4; .5 INJECTION, POWDER, LYOPHILIZED, FOR SOLUTION INTRAVENOUS at 21:35

## 2019-11-28 RX ADMIN — Medication 100 MILLIEQUIVALENT(S): at 17:35

## 2019-11-28 RX ADMIN — Medication 100 MILLIEQUIVALENT(S): at 19:32

## 2019-11-28 RX ADMIN — PIPERACILLIN AND TAZOBACTAM 25 GRAM(S): 4; .5 INJECTION, POWDER, LYOPHILIZED, FOR SOLUTION INTRAVENOUS at 01:00

## 2019-11-28 RX ADMIN — HEPARIN SODIUM 5000 UNIT(S): 5000 INJECTION INTRAVENOUS; SUBCUTANEOUS at 06:30

## 2019-11-28 NOTE — PROGRESS NOTE ADULT - PROBLEM SELECTOR PLAN 4
HR<130 on monitor, improving will monitor off telemetry. no a/c at this point as plan to transition to hospice

## 2019-11-28 NOTE — PROGRESS NOTE ADULT - SUBJECTIVE AND OBJECTIVE BOX
[facility] Division of Hospital Medicine  Luis Cheung MD  Pager [phone]      Patient is a 89y old  Female who presents with a chief complaint of SOB (2019 15:34)      SUBJECTIVE / OVERNIGHT EVENTS: Unable to obtain due to non-verbal status    MEDICATIONS  (STANDING):  dextrose 5% + sodium chloride 0.45%. 1000 milliLiter(s) (100 mL/Hr) IV Continuous <Continuous>  heparin  Injectable 5000 Unit(s) SubCutaneous every 8 hours  piperacillin/tazobactam IVPB.. 3.375 Gram(s) IV Intermittent every 8 hours    MEDICATIONS  (PRN):      CAPILLARY BLOOD GLUCOSE        I&O's Summary      PHYSICAL EXAM:  Vital Signs Last 24 Hrs  T(C): 36.4 (2019 23:15), Max: 36.4 (2019 23:15)  T(F): 97.6 (2019 23:15), Max: 97.6 (2019 23:15)  HR: 60 (2019 04:10) (60 - 120)  BP: 91/60 (2019 23:15) (91/60 - 127/75)  BP(mean): --  RR: 18 (2019 06:46) (18 - 18)  SpO2: 97% (2019 06:46) (89% - 97%)    CONSTITUTIONAL: NAD  EYES: PERRLA; conjunctiva and sclera clear  ENMT: dry mucus membrane  RESPIRATORY: diffuse rhonchi  CARDIOVASCULAR: Regular rate and rhythm, + S1 and S2  ABDOMEN: Nontender to palpation, normoactive bowel sounds, no rebound/guarding  MUSCULOSKELETAL:  no clubbing or cyanosis of digits;   PSYCH: A+O x 0    LABS:                        12.6   13.66 )-----------( 274      ( 2019 10:17 )             43.7         160<HH>  |  115<H>  |  23  ----------------------------<  150<H>  3.6   |  34<H>  |  0.58    Ca    9.6      2019 15:00  Mg     2.5         TPro  7.1  /  Alb  3.4  /  TBili  0.9  /  DBili  x   /  AST  33<H>  /  ALT  24  /  AlkPhos  69      PT/INR - ( 2019 09:49 )   PT: 13.7 SEC;   INR: 1.20          PTT - ( 2019 09:49 )  PTT:29.2 SEC      Urinalysis Basic - ( 2019 09:58 )    Color: YELLOW / Appearance: CLEAR / S.027 / pH: 5.5  Gluc: NEGATIVE / Ketone: NEGATIVE  / Bili: NEGATIVE / Urobili: TRACE   Blood: SMALL / Protein: 70 / Nitrite: NEGATIVE   Leuk Esterase: NEGATIVE / RBC: 6-10 / WBC 3-5   Sq Epi: OCC / Non Sq Epi: x / Bacteria: FEW        Culture - Blood (collected 2019 12:38)  Source: Peripheral Site 1  Preliminary Report (2019 12:39):    NO ORGANISMS ISOLATED    NO ORGANISMS ISOLATED AT 24 HOURS    Culture - Blood (collected 2019 12:32)  Source: Peripheral Site 2  Preliminary Report (2019 12:33):    NO ORGANISMS ISOLATED    NO ORGANISMS ISOLATED AT 24 HOURS    Culture - Urine (collected 2019 11:23)  Source: URINE MIDSTREAM  Final Report (2019 07:52):    NO GROWTH AT 24 HOURS

## 2019-11-28 NOTE — PROGRESS NOTE ADULT - PROBLEM SELECTOR PLAN 2
suspect 2/2 gram neg pna   -will c/w zosyn, f/u blood and urine cultures, tylenol for fever, c/w IVF   -discussed repeat imaging and IR eval for perc vidhya with son. due to risk for aspiration, hypoxic respiratory failure and poor prognosis, he agrees to defer repeat imaging of gall bladder and evaluation for perc vidhya drain. he agrees to continue w/ abx, IVF with eventual transition to hospice care  -has stage 1 sacral decub and will order nurse wound care assessment

## 2019-11-28 NOTE — PROGRESS NOTE ADULT - PROBLEM SELECTOR PLAN 1
-likely due to aspiration pNA ( given CXR finding) and RSV  -failed S/S eval on last admission and family agreed to comfort feeds. family is okay with continuing comfort feeds. Molst form completed and signed. Patient is DNR/DNI, no pressors. will c/w zosyn and high flow. wean off high flow as tolerated. per son, goal will be to transition patient to home with hospice vs NH with hospice capabilities.

## 2019-11-28 NOTE — PROGRESS NOTE ADULT - PROBLEM SELECTOR PLAN 5
dvt ppx: HSQ   diet: dysphagia 1- comfort feeds  dispo: pending transition to hospice  GOC: DNR/DNI, no pressors

## 2019-11-29 DIAGNOSIS — K59.00 CONSTIPATION, UNSPECIFIED: ICD-10-CM

## 2019-11-29 DIAGNOSIS — J98.8 OTHER SPECIFIED RESPIRATORY DISORDERS: ICD-10-CM

## 2019-11-29 DIAGNOSIS — R45.1 RESTLESSNESS AND AGITATION: ICD-10-CM

## 2019-11-29 DIAGNOSIS — Z71.89 OTHER SPECIFIED COUNSELING: ICD-10-CM

## 2019-11-29 DIAGNOSIS — R11.0 NAUSEA: ICD-10-CM

## 2019-11-29 DIAGNOSIS — R52 PAIN, UNSPECIFIED: ICD-10-CM

## 2019-11-29 DIAGNOSIS — R06.02 SHORTNESS OF BREATH: ICD-10-CM

## 2019-11-29 DIAGNOSIS — R53.2 FUNCTIONAL QUADRIPLEGIA: ICD-10-CM

## 2019-11-29 DIAGNOSIS — G93.41 METABOLIC ENCEPHALOPATHY: ICD-10-CM

## 2019-11-29 DIAGNOSIS — Z51.5 ENCOUNTER FOR PALLIATIVE CARE: ICD-10-CM

## 2019-11-29 LAB
ANION GAP SERPL CALC-SCNC: 10 MMO/L — SIGNIFICANT CHANGE UP (ref 7–14)
BASOPHILS # BLD AUTO: 0.02 K/UL — SIGNIFICANT CHANGE UP (ref 0–0.2)
BASOPHILS NFR BLD AUTO: 0.2 % — SIGNIFICANT CHANGE UP (ref 0–2)
BUN SERPL-MCNC: 16 MG/DL — SIGNIFICANT CHANGE UP (ref 7–23)
CALCIUM SERPL-MCNC: 9.1 MG/DL — SIGNIFICANT CHANGE UP (ref 8.4–10.5)
CHLORIDE SERPL-SCNC: 114 MMOL/L — HIGH (ref 98–107)
CO2 SERPL-SCNC: 31 MMOL/L — SIGNIFICANT CHANGE UP (ref 22–31)
CREAT SERPL-MCNC: 0.5 MG/DL — SIGNIFICANT CHANGE UP (ref 0.5–1.3)
EOSINOPHIL # BLD AUTO: 0.01 K/UL — SIGNIFICANT CHANGE UP (ref 0–0.5)
EOSINOPHIL NFR BLD AUTO: 0.1 % — SIGNIFICANT CHANGE UP (ref 0–6)
GLUCOSE SERPL-MCNC: 113 MG/DL — HIGH (ref 70–99)
HCT VFR BLD CALC: 42 % — SIGNIFICANT CHANGE UP (ref 34.5–45)
HGB BLD-MCNC: 11.5 G/DL — SIGNIFICANT CHANGE UP (ref 11.5–15.5)
IMM GRANULOCYTES NFR BLD AUTO: 0.7 % — SIGNIFICANT CHANGE UP (ref 0–1.5)
LYMPHOCYTES # BLD AUTO: 0.76 K/UL — LOW (ref 1–3.3)
LYMPHOCYTES # BLD AUTO: 5.7 % — LOW (ref 13–44)
MAGNESIUM SERPL-MCNC: 2.1 MG/DL — SIGNIFICANT CHANGE UP (ref 1.6–2.6)
MCHC RBC-ENTMCNC: 27.4 % — LOW (ref 32–36)
MCHC RBC-ENTMCNC: 29.1 PG — SIGNIFICANT CHANGE UP (ref 27–34)
MCV RBC AUTO: 106.3 FL — HIGH (ref 80–100)
MONOCYTES # BLD AUTO: 0.33 K/UL — SIGNIFICANT CHANGE UP (ref 0–0.9)
MONOCYTES NFR BLD AUTO: 2.5 % — SIGNIFICANT CHANGE UP (ref 2–14)
NEUTROPHILS # BLD AUTO: 12.08 K/UL — HIGH (ref 1.8–7.4)
NEUTROPHILS NFR BLD AUTO: 90.8 % — HIGH (ref 43–77)
NRBC # FLD: 0 K/UL — SIGNIFICANT CHANGE UP (ref 0–0)
PLATELET # BLD AUTO: 165 K/UL — SIGNIFICANT CHANGE UP (ref 150–400)
PMV BLD: 11.5 FL — SIGNIFICANT CHANGE UP (ref 7–13)
POTASSIUM SERPL-MCNC: 3.2 MMOL/L — LOW (ref 3.5–5.3)
POTASSIUM SERPL-SCNC: 3.2 MMOL/L — LOW (ref 3.5–5.3)
RBC # BLD: 3.95 M/UL — SIGNIFICANT CHANGE UP (ref 3.8–5.2)
RBC # FLD: 16.2 % — HIGH (ref 10.3–14.5)
SODIUM SERPL-SCNC: 155 MMOL/L — HIGH (ref 135–145)
WBC # BLD: 13.29 K/UL — HIGH (ref 3.8–10.5)
WBC # FLD AUTO: 13.29 K/UL — HIGH (ref 3.8–10.5)

## 2019-11-29 PROCEDURE — 99498 ADVNCD CARE PLAN ADDL 30 MIN: CPT

## 2019-11-29 PROCEDURE — 93971 EXTREMITY STUDY: CPT | Mod: 26

## 2019-11-29 PROCEDURE — 99223 1ST HOSP IP/OBS HIGH 75: CPT

## 2019-11-29 PROCEDURE — 99233 SBSQ HOSP IP/OBS HIGH 50: CPT | Mod: GC

## 2019-11-29 PROCEDURE — 99497 ADVNCD CARE PLAN 30 MIN: CPT | Mod: 25

## 2019-11-29 RX ORDER — SODIUM CHLORIDE 9 MG/ML
1000 INJECTION, SOLUTION INTRAVENOUS
Refills: 0 | Status: DISCONTINUED | OUTPATIENT
Start: 2019-11-29 | End: 2019-11-30

## 2019-11-29 RX ORDER — POTASSIUM CHLORIDE 20 MEQ
10 PACKET (EA) ORAL
Refills: 0 | Status: COMPLETED | OUTPATIENT
Start: 2019-11-29 | End: 2019-11-29

## 2019-11-29 RX ADMIN — Medication 100 MILLIEQUIVALENT(S): at 13:48

## 2019-11-29 RX ADMIN — Medication 100 MILLIEQUIVALENT(S): at 16:30

## 2019-11-29 RX ADMIN — SODIUM CHLORIDE 100 MILLILITER(S): 9 INJECTION, SOLUTION INTRAVENOUS at 13:48

## 2019-11-29 RX ADMIN — HEPARIN SODIUM 5000 UNIT(S): 5000 INJECTION INTRAVENOUS; SUBCUTANEOUS at 06:31

## 2019-11-29 RX ADMIN — PIPERACILLIN AND TAZOBACTAM 25 GRAM(S): 4; .5 INJECTION, POWDER, LYOPHILIZED, FOR SOLUTION INTRAVENOUS at 02:05

## 2019-11-29 RX ADMIN — Medication 100 MILLIEQUIVALENT(S): at 15:30

## 2019-11-29 RX ADMIN — HEPARIN SODIUM 5000 UNIT(S): 5000 INJECTION INTRAVENOUS; SUBCUTANEOUS at 22:41

## 2019-11-29 RX ADMIN — HEPARIN SODIUM 5000 UNIT(S): 5000 INJECTION INTRAVENOUS; SUBCUTANEOUS at 14:46

## 2019-11-29 RX ADMIN — PIPERACILLIN AND TAZOBACTAM 25 GRAM(S): 4; .5 INJECTION, POWDER, LYOPHILIZED, FOR SOLUTION INTRAVENOUS at 09:25

## 2019-11-29 RX ADMIN — PIPERACILLIN AND TAZOBACTAM 25 GRAM(S): 4; .5 INJECTION, POWDER, LYOPHILIZED, FOR SOLUTION INTRAVENOUS at 18:01

## 2019-11-29 NOTE — CONSULT NOTE ADULT - PROBLEM SELECTOR RECOMMENDATION 7
.  # Code: DNR, DNI, No feeding tube  # HCP: Malachi and Jarret (sons) are surrogates    > As per my chart review and examination, the patient likely has days left. She is obtunded, with some mild labored breathing on 100% FiO2, and cool extremities.   > She is appropriate for hospice care, pending decision of family as above to adopt a more comfort-centric approach to her care

## 2019-11-29 NOTE — CONSULT NOTE ADULT - SUBJECTIVE AND OBJECTIVE BOX
HPI:  The patient is a 89yo Female with a history of:  # Dementia  # PAfib  # Dsyphagia and recurrent aspiration pneumonia    She is currently admitted for hypoxemic respiratory failure due to RSV pneumonia + Aspiration pneumonia, requiring high flow O2 supplementation. She is DNR, DNI, No feeding tube.    Palliative Care was consulted to discuss goals of care with the patient's family.    11/29  I met with the patient's 2 sons (Malachi and Jarret). They verbalized a good understanding of the patient's current condition. They also expressed that, more than anything, they want the patient to be comfortable: "It doesn't matter if she has a couple more days left to live or not. It doesn't matter when her date of death is, she's 89 years old. We just want her not to suffer"    In light of that, I described what Palliative Care is and how it is related to hospice. They asked some questions, which I answered. Most notably, they asked me how much time she has left. I answered she likely has "days to weeks- more of days left". They understood this.     They were very anxious so I asked them how they want to receive information. I recommended that we go about it using bullet points, which they agreed on:    1) Hypoxemia and air hunger/ shortness of breath: I discussed the pros and cons of antibiotics. Pros (it MIGHT slow down the pre-existing aspiration pneumonia) and cons (it doesn't prevent further aspiration/ it does not provide any comfort/ it exposed her to risks of Cdiff and possible AIN).   2) Opioid therapy: I explained the pros and cons to them. Pros (symptom relief) and cons (constipation and somnolence).   3) Stopping all workup including lab draws. They both agreed that there is no use of continuing workup. "She's already a pincushion!"  4) The goal is to take her home with hospice or possibly inpatient hospice, whatever is more appropriate    Malachi and Jarret said they would think about these things and then they would make a decision and let the medical team know.     Total face to face time discussing goals of care, advance care planning, code status and hospice = 50 mins      =================================================================================================  ----- PERTINENT PMH/ SXH/ FHX  Dementia  No significant past medical history    History of hip surgery    FAMILY HISTORY:  No pertinent family history in first degree relatives      ----- SOCIAL HISTORY:   Significant other/partner: Yes [ ]  No [ ] Children:  Yes [X ]  No [ ] Anglican/Spirituality:  Substance hx: Yes[ ]  No [X ]   Tobacco hx:  Yes [ ] No [X ]   Alcohol hx: Yes [ ] No X[ ]   Home Opioid hx:  [ ] I-Stop Reference No:  Living Situation: [X ]Home  [ ]Long term care  [ ]Rehab [ ]Other    ----- ADVANCE DIRECTIVES:    DNR  Yes  Yes  MOLST  [ ]  Living Will  [ ]   DECISION MAKER(s):  [ ] Health Care Proxy(s)  [X ] Surrogate(s)  [ ] Guardian           Name(s): Phone Number(s):  MALACHI DESAI    ----- BASELINE (I)ADL(s) (prior to admission):  [place]: [ ]Total  [ ] Moderate [ ]Dependent  Palliative Performance Status Version 2:  30%    =================================================================================================  -----MEDICATIONS AND ALLERGIES:  Allergies    No Known Allergies    Intolerances    MEDICATIONS  (STANDING):  dextrose 5% + sodium chloride 0.45%. 1000 milliLiter(s) (100 mL/Hr) IV Continuous <Continuous>  heparin  Injectable 5000 Unit(s) SubCutaneous every 8 hours  piperacillin/tazobactam IVPB.. 3.375 Gram(s) IV Intermittent every 8 hours  potassium chloride  10 mEq/100 mL IVPB 10 milliEquivalent(s) IV Intermittent every 1 hour    MEDICATIONS  (PRN):    =================================================================================================  ----- SYMPTOM ASSESSMENT: [ X]Unable to obtain due to poor mentation   Source if other than patient:  [ X]Family   [ ]Team     Pain (Impact on QOL):  N/A  Location -   Severity -        Minimal acceptable level (0-10 scale):  Quality:   Onset:   Duration:                 Aggravating factors -  Relieving factors -  Radiation -    PAIN AD Score: 1    Dyspnea:  Yes [X ] No [ ] - [ ]Mild [ ]Moderate [X ]Severe  Anxiety:    Yes [ ] No [X ] - [ ]Mild [ ]Moderate [ ]Severe  Fatigue:    Yes [ ] No [ X] - [ ]Mild [ ]Moderate [ ]Severe  Nausea:    Yes [ ] No [X ] - [ ]Mild [ ]Moderate [ ]Severe                         Loss of appetite: Yes [ ] No [X ] - [ ]Mild [ ]Moderate [ ]Severe             Constipation:  Yes [ ] No [ X] - [ ]Mild [ ]Moderate [ ]Severe  Grief:  Yes [ ] No [ ]     Other Symptoms:  [X ]All other review of systems negative       =================================================================================================  ----- PHYSICAL EXAM:  Vital Signs Last 24 Hrs  T(C): 36.5 (29 Nov 2019 05:59), Max: 36.5 (29 Nov 2019 05:59)  T(F): 97.7 (29 Nov 2019 05:59), Max: 97.7 (29 Nov 2019 05:59)  HR: 88 (29 Nov 2019 05:59) (60 - 96)  BP: 98/52 (29 Nov 2019 05:59) (98/52 - 98/52)  BP(mean): --  RR: 16 (29 Nov 2019 06:57) (16 - 18)  SpO2: 97% (29 Nov 2019 06:57) (92% - 97%) I&O's Summary    GEN: +Obtunded  HEENT: +Temporal wasting, +High flow O2 via NC but pt is mouth breathing  PULM: +Tachypnea, +Mildly labored breathing  CV: +Tachycardic  ABD: Soft, non-tender  EXT: No edema, +Cool extremities  PSYCH: Unable to assess  NEURO: No gross deficits, no tremors  SKIN: No rashes or lesions on exposed skin, No jaundice      =================================================================================================  ----- LABS:                        11.5   13.29 )-----------( 165      ( 29 Nov 2019 09:30 )             42.0   11-29    155<H>  |  114<H>  |  16  ----------------------------<  113<H>  3.2<L>   |  31  |  0.50    Ca    9.1      29 Nov 2019 09:30  Mg     2.1     11-29          -----RADIOLOGY & ADDITIONAL STUDIES:    PROTEIN CALORIE MALNUTRITION PRESENT: [ ] Yes [ ] No  [ ] PPSV2 < or = to 30% [ ] significant weight loss  [ ] poor nutritional intake [ ] catabolic state [ ] anasarca     Albumin, Serum: 3.4 g/dL (11-27-19 @ 09:15)      REFERRALS:   [ ]Chaplaincy  [ ] Hospice  [ ]Child Life  [ ]Social Work  [ ]Case management [ ]Holistic Therapy   Goals of Care Discussion Document:

## 2019-11-29 NOTE — ADVANCED PRACTICE NURSE CONSULT - ASSESSMENT
A&Ox0, bedbound, incontinent of urine and stool. Skin warm, dry with increased moisture in intertriginous folds, scattered areas of eccyhmosis on bilateral upper extremities. Blanchable erythema on bilateral heels. Bilateral hands and feet cold to touch. Right lateral heel with area of mottling/blanchable purple discoloration, right 4th and 5th toes and left 4th and 5th toe (dorsal aspects of toes) with blanchable erythema. On hi-flow nasal cannula. Nonpalpable DP/PT pulses with monophasic doppler sounds.    Right upper arm with +4 weeping edema, and generalized ecchymosis with pinpoint opening and serous drainage, no odor.     Mixed etiology of moisture and incontinence associated dermatitis of sacral fold extending to bilateral buttock: generalized blanchable erythema with blistering in sacral fold and perianal area. When patient is in natural anatomical position unable to visualize blistering.

## 2019-11-29 NOTE — PROGRESS NOTE ADULT - PROBLEM SELECTOR PLAN 2
suspect 2/2 gram neg pna   -will c/w zosyn, f/u blood and urine cultures, tylenol for fever, c/w IVF   -further care as above

## 2019-11-29 NOTE — ADVANCED PRACTICE NURSE CONSULT - RECOMMEDATIONS
Recommend R/O DVT RUE.    RUE: Cleanse with NS, pat dry, Apply Liquid barrier film to periwound skin. Apply Foam without border and secure with Kerlix wrap. Change every other day and PRN with drainage strikethrough.    B/L heels and areas of mottling of feet: Apply Liquid barrier film daily.    IAD/MAD: Cleanse with skin cleanser, pat dry. Apply Critic-aid clear ointment twice a day and PRN.    Continue low air loss bed therapy, continue heel elevation, continue to turn & reposition q2h, continue moisture management with barrier ointment & single breathable pad, continue measures to decrease friction/shear/pressure.     Please call wound care service line is further assistance is needed ([phone]).

## 2019-11-29 NOTE — ADVANCED PRACTICE NURSE CONSULT - REASON FOR CONSULT
Patient seen on skin care rounds after wound care referral received for assessment of skin impairment and recommendations of topical management. Chart reviewed: Serum WBC 16.93, blood and urine culture negative, RSV (+), Oli 8, BMI 22.7kg/m2, DNR status. Patient H/O dementia, paroxysmal AFib, who presents to ED with Aide c/o increasing weakness/fatigue over past 2 days along with coughing and "gurgling". Admitted with aspiration PNA.

## 2019-11-29 NOTE — PROGRESS NOTE ADULT - ASSESSMENT
89F h/o dementia (AAOx2 at baseline), paroxysmal AFib, who presents to ED with Aide c/o increasing weakness/fatigue over past 2 days along with coughing, inc WOB, a/f hypoxic respiratory failure and sepsis likely 2/2 aspiration pna and RSV

## 2019-11-29 NOTE — CONSULT NOTE ADULT - PROBLEM SELECTOR RECOMMENDATION 8
Thank you for allowing us to participate in your patient's care. We will continue to follow with you. Please page [phone] for any q's or c's.     Rayshawn Kennedy  Palliative Medicine

## 2019-11-29 NOTE — CONSULT NOTE ADULT - ASSESSMENT
89 yo with hypoxemic respiratory failure due to RSV pneumonia, aspiration pneumonia. Palliative Care was asked to discuss GOC with the family.     Pending family decision regardin) To discontinue antibiotics  2) To start opioids for air hunger/ shortness of breath  3) To discontinue further workup    ======================================================================    I will make recommendations below regarding management of end of life care including shortness of breath, including opioids, in case the family decides to take a more comfort-centric approach to her care.    END OF LIFE CARE RECOMMENDATIONS:  - Comfort care measures only  - Discontinue all lab tests  - Discontinue all monitors and alarms  - Transfer to private room if possible  - No visiting hour restrictions for families if possible  - Ask patient/ family if they want a  or     # AGITATION/ TERMINAL DELIRIUM  - Pls do not give sedatives to hypoactive delirium or non-distressing agitation  - Consider: Haldol 0.5mg or Ativan 0.5mg IV/IM/SQ/PO q4h PRN  - Continue clear communication as you are with patient and family    # DYSPNEA/ SHORTNESS OF BREATH/ AIR HUNGER  - Consider: Morphine 0.5mg IV/IM/SQ q4h PRN for labored breathing (or pain)  - If patient can tolerate PO, consider: Morphine 2.5mg PO liquid q4h PRN  - Can increase these medications in increments of 0.5mg IV and 2.5mg PO respectively. Can also decrease interval to q2h PRN if warranted. Pls contact Palliative Care if symptoms remain uncontrolled (pager [phone])  - Continue O2 supplementation for now. Doubt if it is contributing much to O2 sats as the patient is breathing through her mouth, but I would like to continue it more for dyspnea relief and trigeminal stimulation    # CONGESTION OF UPPER AIRWAY/ TERMINAL SECRETIONS  - Consider: Glycopyrrolate 0,4mg IV/ SQ q4h PRN  - Can also consider a Scopolamine patch, although watch out for delirium    # PAIN  - Opioids as above    # NAUSEA  - Consider: Zofran 4mg IV q4h PRN  - Can also consider: Haldol 0.5mg or Ativan 0.5mg IV/IM/SQ/PO q4h PRN for nausea    # CONSTIPATION  - Will defer constipation management for comfort

## 2019-11-29 NOTE — PROGRESS NOTE ADULT - PROBLEM SELECTOR PLAN 1
-likely due to aspiration pNA ( given CXR finding) and RSV  -failed S/S eval on last admission and family agreed to comfort feeds. family is okay with continuing comfort feeds. Molst form completed and signed. Patient is DNR/DNI, no pressors. will c/w zosyn and high flow. wean off high flow as tolerated. Attempted to wean, desat to 80s, will continue to try weaning,   per son, goal will be to transition patient to home with hospice vs NH with hospice capabilities.

## 2019-11-29 NOTE — PROGRESS NOTE ADULT - SUBJECTIVE AND OBJECTIVE BOX
[facility] Division of Hospital Medicine  Luis Cheung MD  Pager [phone]      Patient is a 89y old  Female who presents with a chief complaint of SOB (28 Nov 2019 12:48)      SUBJECTIVE / OVERNIGHT EVENTS: Unable to obtain due to AMS    MEDICATIONS  (STANDING):  dextrose 5% + sodium chloride 0.45%. 1000 milliLiter(s) (100 mL/Hr) IV Continuous <Continuous>  heparin  Injectable 5000 Unit(s) SubCutaneous every 8 hours  piperacillin/tazobactam IVPB.. 3.375 Gram(s) IV Intermittent every 8 hours    MEDICATIONS  (PRN):      CAPILLARY BLOOD GLUCOSE        I&O's Summary      PHYSICAL EXAM:  Vital Signs Last 24 Hrs  T(C): 36.5 (29 Nov 2019 05:59), Max: 36.5 (29 Nov 2019 05:59)  T(F): 97.7 (29 Nov 2019 05:59), Max: 97.7 (29 Nov 2019 05:59)  HR: 88 (29 Nov 2019 05:59) (60 - 96)  BP: 98/52 (29 Nov 2019 05:59) (90/58 - 98/52)  BP(mean): --  RR: 16 (29 Nov 2019 06:57) (16 - 18)  SpO2: 97% (29 Nov 2019 06:57) (92% - 97%)    CONSTITUTIONAL: drowsy  ENMT: dry mucus membrane  RESPIRATORY: diffuse rhonchi  CARDIOVASCULAR: Regular rate and rhythm, + S1 and S2  ABDOMEN: Nontender to palpation, normoactive bowel sounds, no rebound/guarding  PSYCH: A+O x 0   SKIN: No rashes;     LABS:                        11.5   13.29 )-----------( 165      ( 29 Nov 2019 09:30 )             42.0     11-29    155<H>  |  114<H>  |  16  ----------------------------<  113<H>  3.2<L>   |  31  |  0.50    Ca    9.1      29 Nov 2019 09:30  Mg     2.1     11-29                Culture - Blood (collected 27 Nov 2019 12:38)  Source: Peripheral Site 1  Preliminary Report (28 Nov 2019 12:39):    NO ORGANISMS ISOLATED    NO ORGANISMS ISOLATED AT 24 HOURS    Culture - Blood (collected 27 Nov 2019 12:32)  Source: Peripheral Site 2  Preliminary Report (28 Nov 2019 12:33):    NO ORGANISMS ISOLATED    NO ORGANISMS ISOLATED AT 24 HOURS    Culture - Urine (collected 27 Nov 2019 11:23)  Source: URINE MIDSTREAM  Final Report (28 Nov 2019 07:52):    NO GROWTH AT 24 HOURS

## 2019-11-29 NOTE — GOALS OF CARE CONVERSATION - ADVANCED CARE PLANNING - CONVERSATION DETAILS
[facility]    HC[facility] RN evaluated pt and met with pt's son, Jarret. Reviewed pt's needs and reviewed hospice care and services. (HCN RN had several meetings with pt's son during pt's admission earlier this month. Son declined hospice services at that time; he did not give consent fo r hospice care.)    At today's meeting, son stated he wishes pt to be treated for infection w/ antibiotics.     He and his brother will meet with hospice RN again on Fri 11/29/19.   Hospice RN continuing to follow.    There is no consent for hospice at this time.
[facility]    HC[facility] RN met with pt's son, Ever Schrader again today - along with his brother, Malachi Schrader.    Reviewed pt's care needs. At presen, t pt's supplemental o2 requirements (FiO2 100% @ 50 LPM) exceed what can be delivered at home and in inpt hospice units.     Collaborated with Palliative Care Team Attending  MD, Dr eKnnedy. He met with pt's sons as well and discussed focusing on pt's symptom management/ comfort. Sons are considering this and will collaborate together with Medical Team and make their decisions known.    There is no consent for hospice care at this time.      Hospice RN remains available for care planning as indicate.

## 2019-11-30 LAB
BASOPHILS # BLD AUTO: 0.01 K/UL — SIGNIFICANT CHANGE UP (ref 0–0.2)
BASOPHILS NFR BLD AUTO: 0.1 % — SIGNIFICANT CHANGE UP (ref 0–2)
BASOPHILS NFR SPEC: 0.9 % — SIGNIFICANT CHANGE UP (ref 0–2)
BLASTS # FLD: 0 % — SIGNIFICANT CHANGE UP (ref 0–0)
EOSINOPHIL # BLD AUTO: 0 K/UL — SIGNIFICANT CHANGE UP (ref 0–0.5)
EOSINOPHIL NFR BLD AUTO: 0 % — SIGNIFICANT CHANGE UP (ref 0–6)
EOSINOPHIL NFR FLD: 0 % — SIGNIFICANT CHANGE UP (ref 0–6)
GIANT PLATELETS BLD QL SMEAR: PRESENT — SIGNIFICANT CHANGE UP
HCT VFR BLD CALC: 30.1 % — LOW (ref 34.5–45)
HGB BLD-MCNC: 8.1 G/DL — LOW (ref 11.5–15.5)
IMM GRANULOCYTES NFR BLD AUTO: 0.7 % — SIGNIFICANT CHANGE UP (ref 0–1.5)
LYMPHOCYTES # BLD AUTO: 0.48 K/UL — LOW (ref 1–3.3)
LYMPHOCYTES # BLD AUTO: 5.9 % — LOW (ref 13–44)
LYMPHOCYTES NFR SPEC AUTO: 1.8 % — LOW (ref 13–44)
MCHC RBC-ENTMCNC: 26.9 % — LOW (ref 32–36)
MCHC RBC-ENTMCNC: 31.6 PG — SIGNIFICANT CHANGE UP (ref 27–34)
MCV RBC AUTO: 117.6 FL — HIGH (ref 80–100)
METAMYELOCYTES # FLD: 0 % — SIGNIFICANT CHANGE UP (ref 0–1)
MONOCYTES # BLD AUTO: 0.2 K/UL — SIGNIFICANT CHANGE UP (ref 0–0.9)
MONOCYTES NFR BLD AUTO: 2.5 % — SIGNIFICANT CHANGE UP (ref 2–14)
MONOCYTES NFR BLD: 0.9 % — LOW (ref 2–9)
MYELOCYTES NFR BLD: 0 % — SIGNIFICANT CHANGE UP (ref 0–0)
NEUTROPHIL AB SER-ACNC: 87.2 % — HIGH (ref 43–77)
NEUTROPHILS # BLD AUTO: 7.38 K/UL — SIGNIFICANT CHANGE UP (ref 1.8–7.4)
NEUTROPHILS NFR BLD AUTO: 90.8 % — HIGH (ref 43–77)
NEUTS BAND # BLD: 9.2 % — HIGH (ref 0–6)
NRBC # FLD: 0 K/UL — SIGNIFICANT CHANGE UP (ref 0–0)
OTHER - HEMATOLOGY %: 0 — SIGNIFICANT CHANGE UP
OVALOCYTES BLD QL SMEAR: SLIGHT — SIGNIFICANT CHANGE UP
PLATELET # BLD AUTO: 107 K/UL — LOW (ref 150–400)
PLATELET COUNT - ESTIMATE: SIGNIFICANT CHANGE UP
PMV BLD: 11.9 FL — SIGNIFICANT CHANGE UP (ref 7–13)
POIKILOCYTOSIS BLD QL AUTO: SLIGHT — SIGNIFICANT CHANGE UP
PROMYELOCYTES # FLD: 0 % — SIGNIFICANT CHANGE UP (ref 0–0)
RBC # BLD: 2.56 M/UL — LOW (ref 3.8–5.2)
RBC # FLD: 17.3 % — HIGH (ref 10.3–14.5)
VARIANT LYMPHS # BLD: 0 % — SIGNIFICANT CHANGE UP
WBC # BLD: 8.13 K/UL — SIGNIFICANT CHANGE UP (ref 3.8–10.5)
WBC # FLD AUTO: 8.13 K/UL — SIGNIFICANT CHANGE UP (ref 3.8–10.5)

## 2019-11-30 PROCEDURE — 99233 SBSQ HOSP IP/OBS HIGH 50: CPT

## 2019-11-30 RX ORDER — MORPHINE SULFATE 50 MG/1
0.5 CAPSULE, EXTENDED RELEASE ORAL EVERY 4 HOURS
Refills: 0 | Status: DISCONTINUED | OUTPATIENT
Start: 2019-11-30 | End: 2019-12-01

## 2019-11-30 RX ADMIN — PIPERACILLIN AND TAZOBACTAM 25 GRAM(S): 4; .5 INJECTION, POWDER, LYOPHILIZED, FOR SOLUTION INTRAVENOUS at 00:40

## 2019-11-30 RX ADMIN — SODIUM CHLORIDE 100 MILLILITER(S): 9 INJECTION, SOLUTION INTRAVENOUS at 05:04

## 2019-11-30 RX ADMIN — MORPHINE SULFATE 0.5 MILLIGRAM(S): 50 CAPSULE, EXTENDED RELEASE ORAL at 22:10

## 2019-11-30 RX ADMIN — MORPHINE SULFATE 0.5 MILLIGRAM(S): 50 CAPSULE, EXTENDED RELEASE ORAL at 21:51

## 2019-11-30 RX ADMIN — MORPHINE SULFATE 0.5 MILLIGRAM(S): 50 CAPSULE, EXTENDED RELEASE ORAL at 16:31

## 2019-11-30 RX ADMIN — MORPHINE SULFATE 0.5 MILLIGRAM(S): 50 CAPSULE, EXTENDED RELEASE ORAL at 12:23

## 2019-11-30 RX ADMIN — HEPARIN SODIUM 5000 UNIT(S): 5000 INJECTION INTRAVENOUS; SUBCUTANEOUS at 05:04

## 2019-11-30 RX ADMIN — SODIUM CHLORIDE 100 MILLILITER(S): 9 INJECTION, SOLUTION INTRAVENOUS at 00:40

## 2019-11-30 NOTE — PROGRESS NOTE ADULT - SUBJECTIVE AND OBJECTIVE BOX
[facility] Division of Hospital Medicine  Luis Cheung MD  Pager [phone]      Patient is a 89y old  Female who presents with a chief complaint of SOB (29 Nov 2019 14:30)      SUBJECTIVE / OVERNIGHT EVENTS: Unable to obtain due to AMS. Per PA, family agreeable to comfort care no labs    MEDICATIONS  (STANDING):  dextrose 5% + sodium chloride 0.45%. 1000 milliLiter(s) (100 mL/Hr) IV Continuous <Continuous>    MEDICATIONS  (PRN):  morphine  - Injectable 0.5 milliGRAM(s) IV Push every 4 hours PRN labored breathing      CAPILLARY BLOOD GLUCOSE        I&O's Summary      PHYSICAL EXAM:  Vital Signs Last 24 Hrs  T(C): 36.7 (30 Nov 2019 05:02), Max: 36.7 (30 Nov 2019 05:02)  T(F): 98 (30 Nov 2019 05:02), Max: 98 (30 Nov 2019 05:02)  HR: 108 (30 Nov 2019 07:51) (73 - 108)  BP: 118/78 (30 Nov 2019 05:02) (118/78 - 127/95)  BP(mean): --  RR: 18 (30 Nov 2019 11:17) (16 - 19)  SpO2: 96% (30 Nov 2019 07:51) (95% - 98%)    CONSTITUTIONAL: obtunded  EYES: PERRLA; conjunctiva and sclera clear  ENMT: dry mucus membrane  RESPIRATORY: diffuse ronchi  CARDIOVASCULAR: Regular rate and rhythm, + S1 and S2  ABDOMEN: Nontender to palpation, normoactive bowel sounds, no rebound/guarding  PSYCH: A+O x 0  NEUROLOGY: no gross sensory deficits       LABS:                        8.1    8.13  )-----------( 107      ( 30 Nov 2019 08:01 )             30.1     11-29    155<H>  |  114<H>  |  16  ----------------------------<  113<H>  3.2<L>   |  31  |  0.50    Ca    9.1      29 Nov 2019 09:30  Mg     2.1     11-29                Culture - Blood (collected 27 Nov 2019 12:38)  Source: Peripheral Site 1  Preliminary Report (29 Nov 2019 12:39):    NO ORGANISMS ISOLATED    NO ORGANISMS ISOLATED AT 48 HRS.    Culture - Blood (collected 27 Nov 2019 12:32)  Source: Peripheral Site 2  Preliminary Report (29 Nov 2019 12:32):    NO ORGANISMS ISOLATED    NO ORGANISMS ISOLATED AT 48 HRS.

## 2019-11-30 NOTE — CHART NOTE - NSCHARTNOTEFT_GEN_A_CORE
Had long discussion with sons Malachi and Jarret, They agree with comfort measures:  Morphine IV PRN for labored breathing  No further Labs to be drawn  No Further vitals check  no Monitoring  No antibiotics  However they wish to continue with IVF  attending aware of above Plan.

## 2019-11-30 NOTE — PROGRESS NOTE ADULT - PROBLEM SELECTOR PLAN 2
suspect 2/2 gram neg pna   dc zosyn, f/u blood and urine cultures, tylenol for fever, c/w IVF   -further care as above

## 2019-11-30 NOTE — PROGRESS NOTE ADULT - PROBLEM SELECTOR PLAN 1
-likely due to aspiration PNA ( given CXR finding) and RSV  - Poor prognosis, family opting for comfort care, supportive measures, will order medications per palliative recs.   -failed S/S eval on last admission and family agreed to comfort feeds. family is okay with continuing comfort feeds. Molst form completed and signed. Patient is DNR/DNI, no pressors. d/c zosyn and c/w high flow. wean off high flow as tolerated. Attempted to wean, desat to 80s, will continue to try weaning,   per son, goal will be to transition patient to home with hospice vs inpt hospice

## 2019-12-01 VITALS — RESPIRATION RATE: 18 BRPM

## 2019-12-01 PROCEDURE — 99232 SBSQ HOSP IP/OBS MODERATE 35: CPT

## 2019-12-01 RX ADMIN — MORPHINE SULFATE 0.5 MILLIGRAM(S): 50 CAPSULE, EXTENDED RELEASE ORAL at 04:00

## 2019-12-01 RX ADMIN — MORPHINE SULFATE 0.5 MILLIGRAM(S): 50 CAPSULE, EXTENDED RELEASE ORAL at 09:25

## 2019-12-01 RX ADMIN — MORPHINE SULFATE 0.5 MILLIGRAM(S): 50 CAPSULE, EXTENDED RELEASE ORAL at 03:46

## 2019-12-01 RX ADMIN — MORPHINE SULFATE 0.5 MILLIGRAM(S): 50 CAPSULE, EXTENDED RELEASE ORAL at 09:19

## 2019-12-01 NOTE — PROGRESS NOTE ADULT - SUBJECTIVE AND OBJECTIVE BOX
[facility] of Hospital Medicine  Luis Cheung MD  Pager [phone]      Patient is a 89y old  Female who presents with a chief complaint of SOB (30 Nov 2019 12:27)      SUBJECTIVE / OVERNIGHT EVENTS: unable to obtain due to obtundation    MEDICATIONS  (STANDING):    MEDICATIONS  (PRN):  morphine  - Injectable 0.5 milliGRAM(s) IV Push every 4 hours PRN labored breathing      CAPILLARY BLOOD GLUCOSE        I&O's Summary      PHYSICAL EXAM:  Vital Signs Last 24 Hrs  T(C): --  T(F): --  HR: 110 (30 Nov 2019 15:52) (110 - 110)  BP: --  BP(mean): --  RR: 18 (01 Dec 2019 07:42) (18 - 20)  SpO2: 87% (30 Nov 2019 15:52) (87% - 87%)  CONSTITUTIONAL: agonal breathing  ENMT: dry mucus membrane  RESPIRATORY: agonal breathing  CARDIOVASCULAR: Regular rate and rhythm, + S1 and S2  ABDOMEN: Nontender to palpation, normoactive bowel sounds, no rebound/guarding  MUSCULOSKELETAL:  no clubbing or cyanosis of digits;     LABS:                        8.1    8.13  )-----------( 107      ( 30 Nov 2019 08:01 )             30.1

## 2019-12-01 NOTE — DISCHARGE NOTE FOR THE EXPIRED PATIENT - HOSPITAL COURSE
89F h/o dementia (AAOx2 at baseline), paroxysmal AFib, who presents to ED with Aide c/o increasing weakness/fatigue over past 2 days along with coughing, inc WOB, a/f hypoxic respiratory failure and sepsis likely 2/2 aspiration pna and RSV           Problem/Plan - 1:  ·  Problem: Acute respiratory failure with hypoxia.  Plan: -likely due to aspiration PNA ( given CXR finding) and RSV  - Poor prognosis, family was opting for comfort care, supportive measures, will order medications per palliative recs.  Pt was actively dying  -failed S/S eval on last admission and family agreed to comfort feeds. family is okay with continuing comfort feeds. Molst form completed and signed. Patient is DNR/DNI, no pressors. d/c zosyn and c/w high flow. wean off high flow as tolerated. Attempted to wean, desat to 80s, will continue to try weaning,   per son, goal was to transition patient to home with hospice vs inpt hospice.      Problem/Plan - 2:  ·  Problem: Sepsis, due to unspecified organism, unspecified whether acute organ dysfunction present.  Plan: suspect 2/2 gram neg pna   dc zosyn, f/u blood and urine cultures, tylenol for fever, c/w IVF      Problem/Plan - 3:  ·  Problem: Hypernatremia.  Plan: -2/2 dec PO intake, c/w D5 1/2 NS  - slowly improving,   - no labs per family      Problem/Plan - 4:  ·  Problem: Paroxysmal atrial fibrillation.  Plan: HR<130 on monitor, improving will monitor off telemetry. no a/c at this point as plan was to transition to hospice.      Problem/Plan - 5:  ·  Problem: Metabolic encephalopathy.  Plan: likely due to infection and hypernatremia  c/w monitoring and supportive care.      Problem/Plan - 6:  Problem: Functional quadriplegia. Plan: supportive care and frequent turning.     Problem/Plan - 7:  ·  Problem: Need for prophylactic measure.  Plan: dvt ppx: HSQ   diet: dysphagia 1- comfort feeds  dispo: pending transition to hospice  GOC: DNR/DNI, no pressors.     Called to patients bedside to assess pt.  with no respirations, no VS.  Corneal reflexes negative B/L.   Pt.  at 11:48AM.  Family and attending informed. 89F h/o dementia (AAOx2 at baseline), paroxysmal AFib, who presents to ED with Aide c/o increasing weakness/fatigue over past 2 days along with coughing, inc WOB, a/f hypoxic respiratory failure and sepsis likely 2/2 aspiration pna and RSV    - Poor prognosis, family was opting for comfort care, supportive measures, will order medications per palliative recs.  Pt was actively dying  -failed S/S eval on last admission and family agreed to comfort feeds. family is okay with continuing comfort feeds. Molst form completed and signed. Patient is DNR/DNI, no pressors. Initially on zosyn, which was later dced per family's request. . wean off high flow as tolerated. Attempted to wean, desat to 80s, will continue to try weaning. Palliative was called, symptoms management. Pt  inpt          GOC: DNR/DNI, no pressors.     Called to patients bedside to assess pt.  with no respirations, no VS.  Corneal reflexes negative B/L.   Pt.  at 11:48AM.  Family and attending informed.

## 2019-12-01 NOTE — DISCHARGE NOTE FOR THE EXPIRED PATIENT - SECONDARY DIAGNOSIS.
RSV infection Aspiration pneumonia due to vomit, unspecified laterality, unspecified part of lung Dementia

## 2019-12-01 NOTE — PROGRESS NOTE ADULT - PROBLEM SELECTOR PLAN 1
-likely due to aspiration PNA ( given CXR finding) and RSV  - Poor prognosis, family opting for comfort care, supportive measures, will order medications per palliative recs.  Pt actively dying  -failed S/S eval on last admission and family agreed to comfort feeds. family is okay with continuing comfort feeds. Molst form completed and signed. Patient is DNR/DNI, no pressors. d/c zosyn and c/w high flow. wean off high flow as tolerated. Attempted to wean, desat to 80s, will continue to try weaning,   per son, goal will be to transition patient to home with hospice vs inpt hospice

## 2019-12-02 LAB
BACTERIA BLD CULT: SIGNIFICANT CHANGE UP
BACTERIA BLD CULT: SIGNIFICANT CHANGE UP

## 2019-12-03 ENCOUNTER — APPOINTMENT (OUTPATIENT)
Dept: CARDIOLOGY | Facility: CLINIC | Age: 84
End: 2019-12-03

## 2020-04-23 ENCOUNTER — APPOINTMENT (OUTPATIENT)
Dept: CARDIOLOGY | Facility: CLINIC | Age: 85
End: 2020-04-23

## 2020-10-16 NOTE — DISCHARGE NOTE NURSING/CASE MANAGEMENT/SOCIAL WORK - NSDCVIVACCINE_GEN_ALL_CORE_FT
Influenza , 2018/2/24 11:25 , Liza Da Silva (RN) Influenza , 2018/2/24 11:25 , Liza Da Silva (RN)  Influenza , 2019/11/21 14:03 , Brando Trujillo (RN) 16-Oct-2020 21:21

## 2021-11-19 NOTE — ED ADULT TRIAGE NOTE - ACCOMPANIED BY
ED tech attempted again to do ekg. Pt. started became angry and states "I do not want an ekg. Do not touch me." Pt. remains on 1:1. EMT/paramedic

## 2023-05-04 NOTE — PROGRESS NOTE ADULT - PROBLEM SELECTOR PLAN 5
Venipuncture performed with 21 gauge butterfly, x's 1 attempt.  Successful venipuncture to L Antecubital vein.  Specimens collected per orders.      Pressure dressing applied to site, instructed patient to remove dressing in 10-15 minutes, OK to re-adjust dressing if pressure causing any discomfort, to observe closely for numbness and/or discoloration to hand or fingers, and to notify provider if bleeding persists after applying constant pressure lasting 30 minutes.        Sodium fluctuates; generally on the higher side secondary to poor PO intake and insensible losses; given AS, unable to aggressive rehydrate   - trend BMP for Na  - D5 if Na > 150

## 2023-08-10 NOTE — PROGRESS NOTE ADULT - PROBLEM SELECTOR PROBLEM 5
Need for prophylactic measure Show Applicator Variable?: Yes Consent: The patient's consent was obtained including but not limited to risks of crusting, scabbing, blistering, scarring, darker or lighter pigmentary change, recurrence, incomplete removal and infection. Render Post-Care Instructions In Note?: no Duration Of Freeze Thaw-Cycle (Seconds): 5 Detail Level: Detailed Post-Care Instructions: I reviewed with the patient in detail post-care instructions. Patient is to wear sunprotection, and avoid picking at any of the treated lesions. Pt may apply Vaseline to crusted or scabbing areas. Application Tool (Optional): Cry-AC Number Of Freeze-Thaw Cycles: 3 freeze-thaw cycles

## 2024-06-06 NOTE — INPATIENT CERTIFICATION FOR MEDICARE PATIENTS - IN ORDER TO MEET MEDICARE REQUIREMENTS.
normal gait and station , full range of motion
In order to meet Medicare requirements, the clinical documentation must support the information cited in the admission order.  Please be sure to provide detailed and clear documentation about the following in the admitting note/history and physical:

## 2024-08-22 NOTE — PATIENT PROFILE ADULT - FALL HARM RISK TYPE OF ASSESSMENT
INR at goal. Medications reviewed and no changes noted to necessitate warfarin adjustment.   See calendar.         
admission

## 2024-11-18 NOTE — ED PROVIDER NOTE - INTERNATIONAL TRAVEL
Physical Therapy Visit    Visit Type: Daily Treatment Note  Visit: 5  Referring Provider: Jc Thomas MD  Medical Diagnosis (from order): S72.401D - Closed fracture of distal end of right femur with routine healing, unspecified fracture morphology, subsequent encounter  Z98.890 - Post-operative state     SUBJECTIVE                                                                                                               Still using the 2ww.  No acute changes or concerns - continues to improve, she feels.  More sore today with the rain (her typical).    Functional Change: Able to safely enter / exit home via stairs (not with the mechanical stair lift).       Pain / Symptoms  - Patient denies pain / symptoms.      OBJECTIVE                                                                                                                                       Treatment     Therapeutic Exercise  HEP progressed    Nustep.  Seat = 6.  Upper Extremities = 7.  Level = 5.  Time = 6 min.         Countertop.  yellow band around proximal to knees  _ side steps x 15 feet left, right x 3    _ Hip abduction x 10 left, right   _ Hip extension x 10 left, right   _ Hip flexion x 10 left, right             Gait Training        Performed in //bars     Ling training    Height: 6 inches    Ling number: 5    Forward x 4 reps                          Lateral x 4 reps     Stairs training.    Assistive device: none  Railing: bilateral   Steps: 4  Pattern: step to mechanics, protecting right LE ascending /descending (for current safest method)   Reps: 1        Skilled input: as detailed above and verbal instruction/cues    Writer verbally educated and received verbal consent for hand placement, positioning of patient, and techniques to be performed today from patient for therapist position for techniques as described above and how they are pertinent to the patient's plan of care.  Home Exercise Program  Access Code: [email or web address]  URL:  [email or web address]  Date: 11/18/2024  Prepared by: Benjamin Ramirez    Exercises  - Seated Long Arc Quad  - 1 x daily - 5 x weekly - 3 sets - 10 reps  - Standing March with Counter Support  - 1 x daily - 5 x weekly - 3 sets - 10 reps  - Heel Toe Raises with Counter Support  - 1 x daily - 7 x weekly - 3 sets - 10 reps  - Side stepping with resistance above knees and with counter support  - 1 x daily - 3 x weekly - 3 sets - 15 feet distance  - standing 3-way leg reach ; resistance above the knees ; at countertop  - 1 x daily - 3 x weekly - 3 sets - 10 reps    Patient Education  - FYWB: Fall Prevention in the Home [id number]  - Going Up and Down Stairs with Cane      ASSESSMENT                                                                                                            Excellent functional progression - able to enter / exit home via stairs (a first post op - previously using home mechanical lift).    Pain/symptoms after session (out of 10): 0  Education:   - Results of above outlined education: Verbalizes understanding, Demonstrates understanding and Needs reinforcement    PLAN                                                                                                                           Suggestions for next session as indicated: Progress per plan of care     Reassess status - note upcomming Physician appointment        Therapy procedure time and total treatment time can be found documented on the Time Entry flowsheet     No

## 2025-03-17 NOTE — ED ADULT NURSE NOTE - BREATHING, MLM
Called spoke to pt, relayed message as given, pt verbalized understanding.    Spontaneous, unlabored and symmetrical